# Patient Record
Sex: FEMALE | Race: WHITE | NOT HISPANIC OR LATINO | Employment: FULL TIME | URBAN - METROPOLITAN AREA
[De-identification: names, ages, dates, MRNs, and addresses within clinical notes are randomized per-mention and may not be internally consistent; named-entity substitution may affect disease eponyms.]

---

## 2018-11-10 ENCOUNTER — OFFICE VISIT (OUTPATIENT)
Dept: URGENT CARE | Facility: CLINIC | Age: 38
End: 2018-11-10
Payer: COMMERCIAL

## 2018-11-10 VITALS
DIASTOLIC BLOOD PRESSURE: 88 MMHG | TEMPERATURE: 98.4 F | WEIGHT: 216 LBS | RESPIRATION RATE: 16 BRPM | SYSTOLIC BLOOD PRESSURE: 136 MMHG | BODY MASS INDEX: 43.55 KG/M2 | HEIGHT: 59 IN | OXYGEN SATURATION: 99 % | HEART RATE: 72 BPM

## 2018-11-10 DIAGNOSIS — J40 BRONCHITIS: Primary | ICD-10-CM

## 2018-11-10 PROCEDURE — 99213 OFFICE O/P EST LOW 20 MIN: CPT | Performed by: PHYSICIAN ASSISTANT

## 2018-11-10 RX ORDER — AZITHROMYCIN 250 MG/1
TABLET, FILM COATED ORAL
Qty: 6 TABLET | Refills: 0 | Status: SHIPPED | OUTPATIENT
Start: 2018-11-10 | End: 2018-11-15

## 2018-11-10 RX ORDER — PREDNISONE 10 MG/1
TABLET ORAL
Qty: 21 TABLET | Refills: 0 | Status: SHIPPED | OUTPATIENT
Start: 2018-11-10 | End: 2022-04-14

## 2018-11-10 RX ORDER — ATENOLOL 25 MG/1
TABLET ORAL
COMMUNITY
Start: 2016-06-12

## 2018-11-10 NOTE — PROGRESS NOTES
NAME: Yesica Yarbrough is a 34 y o  female  : 1980    MRN: [id number]      Assessment and Plan   Bronchitis [J40]  1  Bronchitis  azithromycin (ZITHROMAX) 250 mg tablet    predniSONE 10 mg tablet           Patient Instructions   Patient Instructions   Take azithromycin and prednisone as directed  Increased fluids and rest  Take antihistamine such as Allegra or Zyrtec  If no improvement in 2-3 days follow-up with PCP  If anything worsens go to the ER    Proceed to ER if symptoms worsen  History of Present Illness     Patient presents complaining of 4 week history of cough and congestion  She reports that she had a skin infection was put on doxycycline  which did nothing for her symptoms  She reports she is now experiencing wheezing, coughing fits, dyspnea on exertion  Denies any fever, chills, leg swelling, cardiac history or any past medical history  Denies any history of asthma or smoking or COPD  Has not taken anything over-the-counter for this        Review of Systems   Review of Systems   Constitutional: Negative for chills and fever  HENT: Positive for congestion and sore throat  Negative for ear pain, rhinorrhea, sinus pain and sinus pressure  Respiratory: Positive for cough, chest tightness, shortness of breath and wheezing  Negative for stridor  Cardiovascular: Negative for chest pain and palpitations           Current Medications       Current Outpatient Prescriptions:     atenolol (TENORMIN) 25 mg tablet, TAKE 1 TABLET BY MOUTH ONCE A DAY, Disp: , Rfl:     co-enzyme Q-10 50 MG capsule, Take 50 mg by mouth daily, Disp: , Rfl:     azithromycin (ZITHROMAX) 250 mg tablet, Take 2 tablets today then 1 tablet daily x 4 days, Disp: 6 tablet, Rfl: 0    predniSONE 10 mg tablet, Take 6 tablets today, 5 tablets tomorrow, 4 the next day, 3 the next day, 2 the following and 1 the last day- all with food, Disp: 21 tablet, Rfl: 0    Current Allergies     Allergies as of 11/10/2018 - Reviewed 11/10/2018   Allergen Reaction Noted    Sulfa antibiotics Anaphylaxis 06/24/2016    Mupirocin Hives 11/10/2018              No past medical history on file  No past surgical history on file  No family history on file  Medications have been verified  The following portions of the patient's history were reviewed and updated as appropriate: allergies, current medications, past family history, past medical history, past social history, past surgical history and problem list     Objective   /88   Pulse 72   Temp 98 4 °F (36 9 °C)   Resp 16   Ht 4' 11" (1 499 m)   Wt 98 kg (216 lb)   SpO2 99%   BMI 43 63 kg/m²      Physical Exam     Physical Exam   Constitutional: She appears well-developed and well-nourished  No distress  HENT:   TMs clear bilaterally without erythema or bulging  Nasal mucosa without erythema or edema  Oropharynx without erythema, edema or exudates  Cardiovascular: Normal rate, regular rhythm and normal heart sounds  Pulmonary/Chest: Effort normal  No respiratory distress  Bilateral expiratory wheezes in the upper lung fields  Bilateral rhonchi in lower lung fields which clears with coughing  No decreased breath sounds  No rales  No accessory muscle use  Lymphadenopathy:     She has no cervical adenopathy

## 2018-11-10 NOTE — PATIENT INSTRUCTIONS
Take azithromycin and prednisone as directed  Increased fluids and rest  Take antihistamine such as Allegra or Zyrtec  If no improvement in 2-3 days follow-up with PCP  If anything worsens go to the ER

## 2018-11-27 ENCOUNTER — APPOINTMENT (OUTPATIENT)
Dept: RADIOLOGY | Facility: CLINIC | Age: 38
End: 2018-11-27
Payer: COMMERCIAL

## 2018-11-27 ENCOUNTER — OFFICE VISIT (OUTPATIENT)
Dept: URGENT CARE | Facility: CLINIC | Age: 38
End: 2018-11-27
Payer: COMMERCIAL

## 2018-11-27 VITALS
HEART RATE: 60 BPM | BODY MASS INDEX: 43.95 KG/M2 | HEIGHT: 59 IN | OXYGEN SATURATION: 98 % | SYSTOLIC BLOOD PRESSURE: 120 MMHG | TEMPERATURE: 98.1 F | RESPIRATION RATE: 16 BRPM | WEIGHT: 218 LBS | DIASTOLIC BLOOD PRESSURE: 84 MMHG

## 2018-11-27 DIAGNOSIS — R05.9 COUGH: Primary | ICD-10-CM

## 2018-11-27 DIAGNOSIS — R05.9 COUGH: ICD-10-CM

## 2018-11-27 PROCEDURE — 71046 X-RAY EXAM CHEST 2 VIEWS: CPT

## 2018-11-27 PROCEDURE — 99203 OFFICE O/P NEW LOW 30 MIN: CPT | Performed by: PREVENTIVE MEDICINE

## 2018-11-27 RX ORDER — PROMETHAZINE HYDROCHLORIDE AND CODEINE PHOSPHATE 6.25; 1 MG/5ML; MG/5ML
5 SYRUP ORAL EVERY 4 HOURS PRN
Qty: 120 ML | Refills: 0 | Status: SHIPPED | OUTPATIENT
Start: 2018-11-27 | End: 2022-04-14

## 2018-11-27 RX ORDER — ALBUTEROL SULFATE 90 UG/1
2 AEROSOL, METERED RESPIRATORY (INHALATION) EVERY 6 HOURS PRN
Qty: 18 G | Refills: 0 | Status: SHIPPED | OUTPATIENT
Start: 2018-11-27

## 2018-11-28 NOTE — PROGRESS NOTES
[email or web address]        NAME: Austen Nuñez is a 34 y o  female  : 1980    MRN: [id number]  DATE: 2018  TIME: 7:46 PM    Assessment and Plan   Cough [R05]  1  Cough  XR chest pa & lateral    promethazine-codeine (PHENERGAN WITH CODEINE) 6 25-10 mg/5 mL syrup    albuterol (VENTOLIN HFA) 90 mcg/act inhaler         Patient Instructions       Follow up with PCP in 3-5 days  Proceed to  ER if symptoms worsen  Chief Complaint     Chief Complaint   Patient presents with    Cough     Pt c/o cough and wheezing since 10/20  Tx for bronchitis 11/10 with z-pack and prednisone but symptoms only slightly improved  History of Present Illness       She has been coughing with on and off wheezing times a month  She was [facility] 2 weeks ago where she was given Z-Saravanan and prednisone  Apparently she felt mildly improved on the medication but symptoms immediately returned  Review of Systems   Review of Systems   Respiratory: Positive for cough, shortness of breath and wheezing            Current Medications       Current Outpatient Prescriptions:     PRAVASTATIN SODIUM PO, Take by mouth, Disp: , Rfl:     albuterol (VENTOLIN HFA) 90 mcg/act inhaler, Inhale 2 puffs every 6 (six) hours as needed for wheezing, Disp: 18 g, Rfl: 0    atenolol (TENORMIN) 25 mg tablet, TAKE 1 TABLET BY MOUTH ONCE A DAY, Disp: , Rfl:     co-enzyme Q-10 50 MG capsule, Take 50 mg by mouth daily, Disp: , Rfl:     predniSONE 10 mg tablet, Take 6 tablets today, 5 tablets tomorrow, 4 the next day, 3 the next day, 2 the following and 1 the last day- all with food, Disp: 21 tablet, Rfl: 0    promethazine-codeine (PHENERGAN WITH CODEINE) 6 25-10 mg/5 mL syrup, Take 5 mL by mouth every 4 (four) hours as needed for cough, Disp: 120 mL, Rfl: 0    Current Allergies     Allergies as of 2018 - Reviewed 2018   Allergen Reaction Noted    Sulfa antibiotics Anaphylaxis 2016    Mupirocin Hives 11/10/2018            The following portions of the patient's history were reviewed and updated as appropriate: allergies, current medications, past family history, past medical history, past social history, past surgical history and problem list      Past Medical History:   Diagnosis Date    Hyperlipidemia     Hypertension        History reviewed  No pertinent surgical history  No family history on file  Medications have been verified  Objective   /84   Pulse 60   Temp 98 1 °F (36 7 °C)   Resp 16   Ht 4' 11" (1 499 m)   Wt 98 9 kg (218 lb)   SpO2 98%   BMI 44 03 kg/m²        Physical Exam     Physical Exam   HENT:   Right Ear: External ear normal    Left Ear: External ear normal    Mouth/Throat: Oropharynx is clear and moist    Cardiovascular: Normal heart sounds  Pulmonary/Chest:   In the lower lung fields slight end inspiratory wheezing throughout  No rales or rhonchi  Lymphadenopathy:     She has no cervical adenopathy

## 2018-11-28 NOTE — PATIENT INSTRUCTIONS
Try the codeine cough medicine to help sleep  Use a Ventolin inhaler to help with the wheezing and the congestion  If you're not markedly improved the next 3-5 days I believe you need to see her family doctor for further workup for asthma

## 2022-04-07 ENCOUNTER — VBI (OUTPATIENT)
Dept: ADMINISTRATIVE | Facility: OTHER | Age: 42
End: 2022-04-07

## 2022-04-07 NOTE — TELEPHONE ENCOUNTER
Upon review of the In Basket request we were able to locate, review, and update the patient chart as requested for Pap Smear (HPV) aka Cervical Cancer Screening  Any additional questions or concerns should be emailed to the Practice Liaisons via [email or web address] email, please do not reply via In Basket      Thank you  Karthik Corey

## 2022-04-14 ENCOUNTER — ANNUAL EXAM (OUTPATIENT)
Dept: OBGYN CLINIC | Facility: CLINIC | Age: 42
End: 2022-04-14
Payer: COMMERCIAL

## 2022-04-14 VITALS
WEIGHT: 223.2 LBS | DIASTOLIC BLOOD PRESSURE: 62 MMHG | HEIGHT: 58 IN | BODY MASS INDEX: 46.85 KG/M2 | SYSTOLIC BLOOD PRESSURE: 120 MMHG

## 2022-04-14 DIAGNOSIS — Z01.419 ROUTINE GYNECOLOGICAL EXAMINATION: Primary | ICD-10-CM

## 2022-04-14 DIAGNOSIS — Z12.31 ENCOUNTER FOR SCREENING MAMMOGRAM FOR MALIGNANT NEOPLASM OF BREAST: ICD-10-CM

## 2022-04-14 PROCEDURE — S0612 ANNUAL GYNECOLOGICAL EXAMINA: HCPCS | Performed by: OBSTETRICS & GYNECOLOGY

## 2022-04-14 NOTE — PROGRESS NOTES
[id number]   [address], [address], [facility], [facility]    ASSESSMENT/PLAN: Dany Rae is a 35 y o   who presents for annual gynecologic exam     Encounter for routine gynecologic examination  - Routine well woman exam completed today  - Cervical Cancer Screening: Current ASCCP Guidelines reviewed  Last Pap: 2020   Next Pap Due:   - COVID vaccine  +  Pfizer   1 + 2  ,    - STI screening offered including HIV testing: na   - Contraceptive counseling discussed  Current contraception   Tubal ligation   - Breast Cancer Screening: Last Mammogram ,  Order given     Additional problems addressed during this visit:  1  Encounter for screening mammogram for malignant neoplasm of breast  -     Mammo screening bilateral w 3d & cad; Future; Expected date: 2023    2  BMI 45 0-49 9, adult (HCC)    43  Cycles  Every 28 d    Flow can wax and wanes  CC:  Annual Gynecologic Examination    HPI: Dany Rae is a 35 y o   who presents for annual gynecologic examination  36 yo here for wellness exam    + BTL  Cycles  Monthly short in duration  + desires to exercise  The following portions of the patient's history were reviewed and updated as appropriate: She  has a past medical history of Anxiety and depression, Hyperlipidemia, Hypertension, Menorrhagia with regular cycle, and Obesity  She  has a past surgical history that includes Mammo (historical) (2020);  section; and Tubal ligation ()  Her family history includes Colon cancer in her maternal grandmother; Coronary artery disease in her father; Early menopause in her mother; Endometriosis in her mother; Hyperlipidemia in her father and mother; Hypertension in her father, maternal aunt, maternal grandfather, maternal grandmother, maternal uncle, mother, paternal aunt, paternal grandfather, paternal grandmother, and paternal uncle; Skin cancer in her father; Stroke in her father    She  reports that she has never smoked  She has never used smokeless tobacco  She reports current alcohol use  She reports that she does not use drugs  Current Outpatient Medications   Medication Sig Dispense Refill    albuterol (VENTOLIN HFA) 90 mcg/act inhaler Inhale 2 puffs every 6 (six) hours as needed for wheezing 18 g 0    atenolol (TENORMIN) 25 mg tablet TAKE 1 TABLET BY MOUTH ONCE A DAY      co-enzyme Q-10 50 MG capsule Take 100 mg by mouth daily        PRAVASTATIN SODIUM PO Take 20 mg by mouth in the morning         No current facility-administered medications for this visit  She is allergic to sulfa antibiotics and mupirocin       Review of Systems   Constitutional: Negative for chills and fever  HENT: Negative for ear pain and sore throat  Eyes: Negative for pain and visual disturbance  Respiratory: Negative for cough and shortness of breath  Cardiovascular: Negative for chest pain and palpitations  Gastrointestinal: Negative for abdominal pain and vomiting  Genitourinary: Negative for dysuria and hematuria  Musculoskeletal: Negative for arthralgias and back pain  Skin: Negative for color change and rash  Neurological: Negative for seizures and syncope  Psychiatric/Behavioral: Negative  All other systems reviewed and are negative  Objective:  /62   Ht 4' 10" (1 473 m)   Wt 101 kg (223 lb 3 2 oz)   LMP 03/27/2022   Breastfeeding No   BMI 46 65 kg/m²    Physical Exam  Vitals and nursing note reviewed  Constitutional:       Appearance: Normal appearance  HENT:      Head: Normocephalic  Cardiovascular:      Rate and Rhythm: Normal rate and regular rhythm  Pulses: Normal pulses  Heart sounds: Normal heart sounds  Pulmonary:      Effort: Pulmonary effort is normal       Breath sounds: Normal breath sounds  Chest:      Chest wall: No mass, lacerations, swelling, tenderness or edema  Breasts: Liban Score is 4   Breasts are symmetrical       Right: Normal  No swelling, bleeding, inverted nipple, mass, nipple discharge, skin change, tenderness, axillary adenopathy or supraclavicular adenopathy  Left: No swelling, bleeding, inverted nipple, mass, nipple discharge, skin change, tenderness, axillary adenopathy or supraclavicular adenopathy  Abdominal:      General: Abdomen is flat  Bowel sounds are normal       Palpations: Abdomen is soft  Genitourinary:     General: Normal vulva  Exam position: Lithotomy position  Pubic Area: No rash  Liban stage (genital): 4       Labia:         Right: No rash, tenderness or lesion  Left: No rash, tenderness or lesion  Urethra: No urethral pain, urethral swelling or urethral lesion  Vagina: Normal       Cervix: No cervical motion tenderness or discharge  Uterus: Normal        Adnexa: Right adnexa normal and left adnexa normal       Rectum: Normal    Musculoskeletal:         General: Normal range of motion  Cervical back: Neck supple  Lymphadenopathy:      Upper Body:      Right upper body: No supraclavicular, axillary or pectoral adenopathy  Left upper body: No supraclavicular, axillary or pectoral adenopathy  Lower Body: No right inguinal adenopathy  No left inguinal adenopathy  Skin:     General: Skin is warm and dry  Neurological:      General: No focal deficit present  Mental Status: She is alert and oriented to person, place, and time  Psychiatric:         Mood and Affect: Mood normal          Behavior: Behavior normal          Thought Content:  Thought content normal          Judgment: Judgment normal

## 2024-11-20 ENCOUNTER — TELEPHONE (OUTPATIENT)
Age: 44
End: 2024-11-20

## 2024-11-20 ENCOUNTER — CONSULT (OUTPATIENT)
Age: 44
End: 2024-11-20
Payer: COMMERCIAL

## 2024-11-20 VITALS
BODY MASS INDEX: 46 KG/M2 | HEIGHT: 59 IN | SYSTOLIC BLOOD PRESSURE: 112 MMHG | DIASTOLIC BLOOD PRESSURE: 78 MMHG | WEIGHT: 228.2 LBS

## 2024-11-20 DIAGNOSIS — K58.2 IRRITABLE BOWEL SYNDROME WITH BOTH CONSTIPATION AND DIARRHEA: ICD-10-CM

## 2024-11-20 DIAGNOSIS — K21.9 GASTROESOPHAGEAL REFLUX DISEASE, UNSPECIFIED WHETHER ESOPHAGITIS PRESENT: Primary | ICD-10-CM

## 2024-11-20 DIAGNOSIS — R10.13 EPIGASTRIC PAIN: ICD-10-CM

## 2024-11-20 DIAGNOSIS — Z12.11 SCREENING FOR COLON CANCER: Primary | ICD-10-CM

## 2024-11-20 DIAGNOSIS — Z12.11 SCREENING FOR COLON CANCER: ICD-10-CM

## 2024-11-20 DIAGNOSIS — R06.02 SHORTNESS OF BREATH: ICD-10-CM

## 2024-11-20 PROCEDURE — 99203 OFFICE O/P NEW LOW 30 MIN: CPT | Performed by: INTERNAL MEDICINE

## 2024-11-20 RX ORDER — ATENOLOL 50 MG/1
1 TABLET ORAL DAILY
COMMUNITY
Start: 2024-10-29

## 2024-11-20 RX ORDER — RABEPRAZOLE SODIUM 20 MG/1
20 TABLET, DELAYED RELEASE ORAL DAILY
Qty: 30 TABLET | Refills: 2 | Status: SHIPPED | OUTPATIENT
Start: 2024-11-20 | End: 2025-02-18

## 2024-11-20 RX ORDER — OMEPRAZOLE 40 MG/1
40 CAPSULE, DELAYED RELEASE ORAL AS NEEDED
COMMUNITY
End: 2024-11-20

## 2024-11-20 RX ORDER — FAMOTIDINE 40 MG/1
40 TABLET, FILM COATED ORAL
COMMUNITY

## 2024-11-20 RX ORDER — SACCHAROMYCES BOULARDII 250 MG
250 CAPSULE ORAL 2 TIMES DAILY
COMMUNITY

## 2024-11-20 RX ORDER — SODIUM PICOSULFATE, MAGNESIUM OXIDE, AND ANHYDROUS CITRIC ACID 12; 3.5; 1 G/175ML; G/175ML; MG/175ML
LIQUID ORAL
Qty: 350 ML | Refills: 0 | Status: SHIPPED | OUTPATIENT
Start: 2024-11-20

## 2024-11-20 RX ORDER — PRAVASTATIN SODIUM 20 MG
1 TABLET ORAL DAILY
COMMUNITY
Start: 2024-10-29

## 2024-11-20 NOTE — H&P (VIEW-ONLY)
[facility] - Outpatient Consultation  Chen Peace 44 y.o. female MRN: [id number]  Encounter: [id number]    ASSESSMENT AND PLAN:      1. Gastroesophageal reflux disease, unspecified whether esophagitis present (Primary)  --Patient with some chronic GERD.  Seems to be worse 2 months or so.  Seems to be worse at night.  Some relief with famotidine.  Seems to get worse as the day goes along.  No dysphagia.  Patient reports that she did not quite feel well on omeprazole.  - RABEprazole (ACIPHEX) 20 MG tablet; Take 1 tablet (20 mg total) by mouth daily  Dispense: 30 tablet; Refill: 2  -Advised patient to take rabeprazole half hour before lunch or dinner.  Continue famotidine in the evening and Tums as needed  -EGD for further evaluation  -Antireflux diet  -Gradual weight loss-patient has lost 14 pounds in the last couple of months    2. Epigastric pain  -Patient with intermittent epigastric abdominal pain.  Sometimes after eating.  Will do abdominal ultrasound to evaluate for gallbladder disease and evaluate contour of the liver    3. Irritable bowel syndrome with both constipation and diarrhea  -Patient with alternating constipation and diarrhea.  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  Crampy abdominal pain relieved with a bowel movement    4. BMI 45.0-49.9, adult (HCC)  -BMI at 46.  Will need to do procedures hospital setting.  Discussed weight loss medications and general concepts regarding bariatric surgery.  If patient was to consider bariatric surgery gastric bypass would be indicated and not gastric sleeve  -GLP 1 medications can cause GI side effects.  Patient already eating several small meals per day    5. Screening for colon cancer  -Patient with a grandparent with colon cancer.  Could consider Cologuard testing however given patient's erratic bowel movements which are worse lately probably reasonable to proceed with  "colonoscopy at same time as EGD    6. Shortness of breath  -Patient does report increasing shortness of breath sometimes with eating and associated with her reflux.  Also has some problems with shortness of breath with exercise.  -Will try to control reflux better.    -She does use a inhaler as needed particularly in cold weather-chest of an element of reactive airway disease    Followup Appointment: 3-4 mo  ______________________________________________________________________    Chief Complaint   Patient presents with    GERD    Abdominal Pain     \"All over\" hard to breathe after eating, eating bland- every food causes pain     Patient referred for GI evaluation by her personal physician Dr. Yuki Noble    HPI:   Chen Peace is a 44 y.o. year old female who presents creasing problems with reflux-like symptoms over the last 2 months or so symptoms seem to be worse at night or as the day goes along.  She does have some relief with Pepcid Complete.  She has been eating bland food with some improvement of her symptoms.  He denies any problems with dysphagia.  She also complains of some belching and subxiphoid discomfort.  Patient reports getting a 2-week course of omeprazole and this did not really help the situation.  She would take the medication before breakfast.  Not had this previously to any major degree.  Patient does report when she gets the symptoms of the reflux she gets associated shortness of breath.  Patient also reports some decreased exercise tolerance and gets short of breath if he goes up some flights of stairs.  Patient does have a history of lower abdominal cramping and erratic bowel movements.  She reports her stools are \"all over the place\".  She has episodes of constipation and diarrhea.  She denies any rectal bleeding.  There were no first-degree relatives with colon cancer but patient did have 1 grandparent with colon cancer.  Patient reports she is eating several smaller meals per day " with about 4-5 which is bland food.  She does have some difficulty with exercise so she is trying to lose weight with diet.  She is lost about 14 pounds in the last couple of months.    Historical Information   Past Medical History:   Diagnosis Date    Anxiety and depression     Hyperlipidemia     Hypertension     Menorrhagia with regular cycle     Obesity     Reactive airway disease      Past Surgical History:   Procedure Laterality Date     SECTION      X2    MAMMO (HISTORICAL)  2020    TUBAL LIGATION      pt had embedded IUD removed and tubal ligation performed     Social History     Substance and Sexual Activity   Alcohol Use Yes    Comment: occasional     Social History     Substance and Sexual Activity   Drug Use Never     Social History     Tobacco Use   Smoking Status Never   Smokeless Tobacco Never     Family History   Problem Relation Age of Onset    Early menopause Mother     Endometriosis Mother     Hypertension Mother     Hyperlipidemia Mother     Skin cancer Father     Stroke Father     Coronary artery disease Father     Hypertension Father     Hyperlipidemia Father     Colon cancer Maternal Grandmother     Hypertension Maternal Grandmother     Hypertension Maternal Grandfather     Hypertension Paternal Grandmother     Hypertension Paternal Grandfather     Hypertension Maternal Aunt     Hypertension Maternal Uncle     Hypertension Paternal Aunt     Hypertension Paternal Uncle     Breast cancer Neg Hx     Ovarian cancer Neg Hx     Uterine cancer Neg Hx        Meds/Allergies     Current Outpatient Medications:     albuterol (VENTOLIN HFA) 90 mcg/act inhaler    atenolol (TENORMIN) 25 mg tablet    atenolol (TENORMIN) 50 mg tablet    co-enzyme Q-10 50 MG capsule    Pepcid 40 MG tablet    pravastatin (PRAVACHOL) 20 mg tablet    PRAVASTATIN SODIUM PO    RABEprazole (ACIPHEX) 20 MG tablet    saccharomyces boulardii (FLORASTOR) 250 mg capsule    Allergies   Allergen Reactions    Sulfa Antibiotics  "Anaphylaxis    Sulfamethoxazole-Trimethoprim Anaphylaxis    Mupirocin Hives       PHYSICAL EXAM:    Blood pressure 112/78, height 4' 11\" (1.499 m), weight 104 kg (228 lb 3.2 oz), not currently breastfeeding. Body mass index is 46.09 kg/m².  General Appearance: NAD, cooperative, alert  Eyes: Anicteric, conjunctiva pink   ENT:  Normocephalic, atraumatic, normal mucosa.    Neck:  Supple, symmetrical, trachea midline,   Resp:  Clear to auscultation bilaterally; no rales, rhonchi or wheezing; respirations unlabored   CV:  S1 S2, Regular rate and rhythm; no murmur, rub, or gallop.  GI:  Soft, non-tender, non-distended; normal bowel sounds; no masses, no organomegaly   Rectal: Deferred  Musculoskeletal: No cyanosis, clubbing or edema. Normal ROM.  Skin:  No jaundice, rashes, or lesions   Heme/Lymph: No palpable cervical lymphadenopathy  Psych: Normal affect, good eye contact  Neuro: No gross deficits, AAOx3        REVIEW OF SYSTEMS:    CONSTITUTIONAL: Denies any fever, chills, rigors, positive for voluntary weight loss  HEENT: No earache or tinnitus. Denies hearing loss or visual disturbances.  CARDIOVASCULAR: No chest pain or palpitations.   RESPIRATORY: Positive for shortness of breath with exertion.  GASTROINTESTINAL: As noted in the History of Present Illness.   GENITOURINARY: No problems with urination. Denies any hematuria or dysuria.  NEUROLOGIC: No dizziness or vertigo, denies headaches.   MUSCULOSKELETAL: Denies any muscle or joint pain.   SKIN: Denies skin rashes or itching.   ENDOCRINE: Denies excessive thirst. Denies intolerance to heat or cold.  PSYCHOSOCIAL: Denies depression or anxiety. Denies any recent memory loss.     "

## 2024-11-20 NOTE — PROGRESS NOTES
[facility] - Outpatient Consultation  Chen Peace 44 y.o. female MRN: [id number]  Encounter: [id number]    ASSESSMENT AND PLAN:      1. Gastroesophageal reflux disease, unspecified whether esophagitis present (Primary)  --Patient with some chronic GERD.  Seems to be worse 2 months or so.  Seems to be worse at night.  Some relief with famotidine.  Seems to get worse as the day goes along.  No dysphagia.  Patient reports that she did not quite feel well on omeprazole.  - RABEprazole (ACIPHEX) 20 MG tablet; Take 1 tablet (20 mg total) by mouth daily  Dispense: 30 tablet; Refill: 2  -Advised patient to take rabeprazole half hour before lunch or dinner.  Continue famotidine in the evening and Tums as needed  -EGD for further evaluation  -Antireflux diet  -Gradual weight loss-patient has lost 14 pounds in the last couple of months    2. Epigastric pain  -Patient with intermittent epigastric abdominal pain.  Sometimes after eating.  Will do abdominal ultrasound to evaluate for gallbladder disease and evaluate contour of the liver    3. Irritable bowel syndrome with both constipation and diarrhea  -Patient with alternating constipation and diarrhea.  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  Crampy abdominal pain relieved with a bowel movement    4. BMI 45.0-49.9, adult (HCC)  -BMI at 46.  Will need to do procedures hospital setting.  Discussed weight loss medications and general concepts regarding bariatric surgery.  If patient was to consider bariatric surgery gastric bypass would be indicated and not gastric sleeve  -GLP 1 medications can cause GI side effects.  Patient already eating several small meals per day    5. Screening for colon cancer  -Patient with a grandparent with colon cancer.  Could consider Cologuard testing however given patient's erratic bowel movements which are worse lately probably reasonable to proceed with  "colonoscopy at same time as EGD    6. Shortness of breath  -Patient does report increasing shortness of breath sometimes with eating and associated with her reflux.  Also has some problems with shortness of breath with exercise.  -Will try to control reflux better.    -She does use a inhaler as needed particularly in cold weather-chest of an element of reactive airway disease    Followup Appointment: 3-4 mo  ______________________________________________________________________    Chief Complaint   Patient presents with    GERD    Abdominal Pain     \"All over\" hard to breathe after eating, eating bland- every food causes pain     Patient referred for GI evaluation by her personal physician Dr. Yuki Noble    HPI:   Chen Peace is a 44 y.o. year old female who presents creasing problems with reflux-like symptoms over the last 2 months or so symptoms seem to be worse at night or as the day goes along.  She does have some relief with Pepcid Complete.  She has been eating bland food with some improvement of her symptoms.  He denies any problems with dysphagia.  She also complains of some belching and subxiphoid discomfort.  Patient reports getting a 2-week course of omeprazole and this did not really help the situation.  She would take the medication before breakfast.  Not had this previously to any major degree.  Patient does report when she gets the symptoms of the reflux she gets associated shortness of breath.  Patient also reports some decreased exercise tolerance and gets short of breath if he goes up some flights of stairs.  Patient does have a history of lower abdominal cramping and erratic bowel movements.  She reports her stools are \"all over the place\".  She has episodes of constipation and diarrhea.  She denies any rectal bleeding.  There were no first-degree relatives with colon cancer but patient did have 1 grandparent with colon cancer.  Patient reports she is eating several smaller meals per day " with about 4-5 which is bland food.  She does have some difficulty with exercise so she is trying to lose weight with diet.  She is lost about 14 pounds in the last couple of months.    Historical Information   Past Medical History:   Diagnosis Date    Anxiety and depression     Hyperlipidemia     Hypertension     Menorrhagia with regular cycle     Obesity     Reactive airway disease      Past Surgical History:   Procedure Laterality Date     SECTION      X2    MAMMO (HISTORICAL)  2020    TUBAL LIGATION      pt had embedded IUD removed and tubal ligation performed     Social History     Substance and Sexual Activity   Alcohol Use Yes    Comment: occasional     Social History     Substance and Sexual Activity   Drug Use Never     Social History     Tobacco Use   Smoking Status Never   Smokeless Tobacco Never     Family History   Problem Relation Age of Onset    Early menopause Mother     Endometriosis Mother     Hypertension Mother     Hyperlipidemia Mother     Skin cancer Father     Stroke Father     Coronary artery disease Father     Hypertension Father     Hyperlipidemia Father     Colon cancer Maternal Grandmother     Hypertension Maternal Grandmother     Hypertension Maternal Grandfather     Hypertension Paternal Grandmother     Hypertension Paternal Grandfather     Hypertension Maternal Aunt     Hypertension Maternal Uncle     Hypertension Paternal Aunt     Hypertension Paternal Uncle     Breast cancer Neg Hx     Ovarian cancer Neg Hx     Uterine cancer Neg Hx        Meds/Allergies     Current Outpatient Medications:     albuterol (VENTOLIN HFA) 90 mcg/act inhaler    atenolol (TENORMIN) 25 mg tablet    atenolol (TENORMIN) 50 mg tablet    co-enzyme Q-10 50 MG capsule    Pepcid 40 MG tablet    pravastatin (PRAVACHOL) 20 mg tablet    PRAVASTATIN SODIUM PO    RABEprazole (ACIPHEX) 20 MG tablet    saccharomyces boulardii (FLORASTOR) 250 mg capsule    Allergies   Allergen Reactions    Sulfa Antibiotics  "Anaphylaxis    Sulfamethoxazole-Trimethoprim Anaphylaxis    Mupirocin Hives       PHYSICAL EXAM:    Blood pressure 112/78, height 4' 11\" (1.499 m), weight 104 kg (228 lb 3.2 oz), not currently breastfeeding. Body mass index is 46.09 kg/m².  General Appearance: NAD, cooperative, alert  Eyes: Anicteric, conjunctiva pink   ENT:  Normocephalic, atraumatic, normal mucosa.    Neck:  Supple, symmetrical, trachea midline,   Resp:  Clear to auscultation bilaterally; no rales, rhonchi or wheezing; respirations unlabored   CV:  S1 S2, Regular rate and rhythm; no murmur, rub, or gallop.  GI:  Soft, non-tender, non-distended; normal bowel sounds; no masses, no organomegaly   Rectal: Deferred  Musculoskeletal: No cyanosis, clubbing or edema. Normal ROM.  Skin:  No jaundice, rashes, or lesions   Heme/Lymph: No palpable cervical lymphadenopathy  Psych: Normal affect, good eye contact  Neuro: No gross deficits, AAOx3        REVIEW OF SYSTEMS:    CONSTITUTIONAL: Denies any fever, chills, rigors, positive for voluntary weight loss  HEENT: No earache or tinnitus. Denies hearing loss or visual disturbances.  CARDIOVASCULAR: No chest pain or palpitations.   RESPIRATORY: Positive for shortness of breath with exertion.  GASTROINTESTINAL: As noted in the History of Present Illness.   GENITOURINARY: No problems with urination. Denies any hematuria or dysuria.  NEUROLOGIC: No dizziness or vertigo, denies headaches.   MUSCULOSKELETAL: Denies any muscle or joint pain.   SKIN: Denies skin rashes or itching.   ENDOCRINE: Denies excessive thirst. Denies intolerance to heat or cold.  PSYCHOSOCIAL: Denies depression or anxiety. Denies any recent memory loss.     "

## 2024-11-20 NOTE — TELEPHONE ENCOUNTER
Scheduled date of combo (as of today): 11/27/2024  Physician performing combo: ND  Location of combo: [facility]  Bowel prep reviewed with patient: Clenpiq  Instructions reviewed with patient by: JOSE  Clearances: N

## 2024-11-20 NOTE — PATIENT INSTRUCTIONS
[facility] - Outpatient Consultation  Chen Peace 44 y.o. female MRN: [id number]  Encounter: [id number]    ASSESSMENT AND PLAN:      1. Gastroesophageal reflux disease, unspecified whether esophagitis present (Primary)  --Patient with some chronic GERD.  Seems to be worse 2 months or so.  Seems to be worse at night.  Some relief with famotidine.  Seems to get worse as the day goes along.  No dysphagia.  Patient reports that she did not quite feel well on omeprazole.  - RABEprazole (ACIPHEX) 20 MG tablet; Take 1 tablet (20 mg total) by mouth daily  Dispense: 30 tablet; Refill: 2  -Advised patient to take rabeprazole half hour before lunch or dinner.  Continue famotidine in the evening and Tums as needed  -EGD for further evaluation  -Antireflux diet  -Gradual weight loss-patient has lost 14 pounds in the last couple of months    2. Epigastric pain  -Patient with intermittent epigastric abdominal pain.  Sometimes after eating.  Will do abdominal ultrasound to evaluate for gallbladder disease and evaluate contour of the liver    3. Irritable bowel syndrome with both constipation and diarrhea  -Patient with alternating constipation and diarrhea.  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  Crampy abdominal pain relieved with a bowel movement    4. BMI 45.0-49.9, adult (HCC)  -BMI at 46.  Will need to do procedures hospital setting.  Discussed weight loss medications and general concepts regarding bariatric surgery.  If patient was to consider bariatric surgery gastric bypass would be indicated and not gastric sleeve  -GLP 1 medications can cause GI side effects.  Patient already eating several small meals per day    5. Screening for colon cancer  -Patient with a grandparent with colon cancer.  Could consider Cologuard testing however given patient's erratic bowel movements which are worse lately probably reasonable to proceed with  colonoscopy at same time as EGD    6. Shortness of breath  -Patient does report increasing shortness of breath sometimes with eating and associated with her reflux.  Also has some problems with shortness of breath with exercise.  -Will try to control reflux better.  -She does use a inhaler as needed particularly in cold weather-chest of an element of reactive airway disease      Followup Appointment: 3-4 mo  _______________________________

## 2024-11-26 ENCOUNTER — TELEPHONE (OUTPATIENT)
Dept: GASTROENTEROLOGY | Facility: CLINIC | Age: 44
End: 2024-11-26

## 2024-11-26 ENCOUNTER — DOCUMENTATION (OUTPATIENT)
Dept: GASTROENTEROLOGY | Facility: CLINIC | Age: 44
End: 2024-11-26

## 2024-11-26 DIAGNOSIS — Z12.11 SCREENING FOR COLON CANCER: Primary | ICD-10-CM

## 2024-11-26 NOTE — TELEPHONE ENCOUNTER
Pharmacy calling back to ask if provider is able to change order(see prev note).  Pt is at the pharmacy.  Needs prep for tonight.     Please advise.

## 2024-11-26 NOTE — TELEPHONE ENCOUNTER
Pharmacy calling to say that Clenpiq is not on patients formulary for insurance.  They will cover Gavolyte G if can change order.

## 2024-11-27 ENCOUNTER — ANESTHESIA (OUTPATIENT)
Dept: GASTROENTEROLOGY | Facility: HOSPITAL | Age: 44
End: 2024-11-27
Payer: COMMERCIAL

## 2024-11-27 ENCOUNTER — ANESTHESIA EVENT (OUTPATIENT)
Dept: GASTROENTEROLOGY | Facility: HOSPITAL | Age: 44
End: 2024-11-27
Payer: COMMERCIAL

## 2024-11-27 ENCOUNTER — HOSPITAL ENCOUNTER (OUTPATIENT)
Dept: GASTROENTEROLOGY | Facility: HOSPITAL | Age: 44
Setting detail: OUTPATIENT SURGERY
Discharge: HOME | End: 2024-11-27
Attending: INTERNAL MEDICINE
Payer: COMMERCIAL

## 2024-11-27 VITALS
RESPIRATION RATE: 19 BRPM | DIASTOLIC BLOOD PRESSURE: 76 MMHG | SYSTOLIC BLOOD PRESSURE: 166 MMHG | TEMPERATURE: 97.8 F | OXYGEN SATURATION: 100 % | HEART RATE: 119 BPM

## 2024-11-27 DIAGNOSIS — K21.9 GASTROESOPHAGEAL REFLUX DISEASE, UNSPECIFIED WHETHER ESOPHAGITIS PRESENT: ICD-10-CM

## 2024-11-27 DIAGNOSIS — Z12.11 SCREENING FOR COLON CANCER: ICD-10-CM

## 2024-11-27 LAB
EXT PREGNANCY TEST URINE: NEGATIVE
EXT. CONTROL: NORMAL
GLUCOSE SERPL-MCNC: 96 MG/DL (ref 65–140)

## 2024-11-27 PROCEDURE — 88305 TISSUE EXAM BY PATHOLOGIST: CPT | Performed by: PATHOLOGY

## 2024-11-27 PROCEDURE — 82948 REAGENT STRIP/BLOOD GLUCOSE: CPT

## 2024-11-27 PROCEDURE — 43239 EGD BIOPSY SINGLE/MULTIPLE: CPT | Performed by: STUDENT IN AN ORGANIZED HEALTH CARE EDUCATION/TRAINING PROGRAM

## 2024-11-27 PROCEDURE — 81025 URINE PREGNANCY TEST: CPT | Performed by: ANESTHESIOLOGY

## 2024-11-27 PROCEDURE — 45385 COLONOSCOPY W/LESION REMOVAL: CPT | Performed by: STUDENT IN AN ORGANIZED HEALTH CARE EDUCATION/TRAINING PROGRAM

## 2024-11-27 RX ORDER — SODIUM CHLORIDE 9 MG/ML
INJECTION, SOLUTION INTRAVENOUS CONTINUOUS PRN
Status: DISCONTINUED | OUTPATIENT
Start: 2024-11-27 | End: 2024-11-27

## 2024-11-27 RX ORDER — PROPOFOL 10 MG/ML
INJECTION, EMULSION INTRAVENOUS AS NEEDED
Status: DISCONTINUED | OUTPATIENT
Start: 2024-11-27 | End: 2024-11-27

## 2024-11-27 RX ORDER — KETAMINE HCL IN NACL, ISO-OSM 100MG/10ML
SYRINGE (ML) INJECTION AS NEEDED
Status: DISCONTINUED | OUTPATIENT
Start: 2024-11-27 | End: 2024-11-27

## 2024-11-27 RX ORDER — ESMOLOL HYDROCHLORIDE 10 MG/ML
INJECTION INTRAVENOUS AS NEEDED
Status: DISCONTINUED | OUTPATIENT
Start: 2024-11-27 | End: 2024-11-27

## 2024-11-27 RX ORDER — PROPOFOL 10 MG/ML
INJECTION, EMULSION INTRAVENOUS CONTINUOUS PRN
Status: DISCONTINUED | OUTPATIENT
Start: 2024-11-27 | End: 2024-11-27

## 2024-11-27 RX ORDER — FENTANYL CITRATE 50 UG/ML
INJECTION, SOLUTION INTRAMUSCULAR; INTRAVENOUS AS NEEDED
Status: DISCONTINUED | OUTPATIENT
Start: 2024-11-27 | End: 2024-11-27

## 2024-11-27 RX ORDER — SODIUM CHLORIDE, SODIUM LACTATE, POTASSIUM CHLORIDE, CALCIUM CHLORIDE 600; 310; 30; 20 MG/100ML; MG/100ML; MG/100ML; MG/100ML
125 INJECTION, SOLUTION INTRAVENOUS CONTINUOUS
Status: DISCONTINUED | OUTPATIENT
Start: 2024-11-27 | End: 2024-12-01 | Stop reason: HOSPADM

## 2024-11-27 RX ORDER — LIDOCAINE HYDROCHLORIDE 20 MG/ML
INJECTION, SOLUTION EPIDURAL; INFILTRATION; INTRACAUDAL; PERINEURAL AS NEEDED
Status: DISCONTINUED | OUTPATIENT
Start: 2024-11-27 | End: 2024-11-27

## 2024-11-27 RX ORDER — GLYCOPYRROLATE 0.2 MG/ML
INJECTION INTRAMUSCULAR; INTRAVENOUS AS NEEDED
Status: DISCONTINUED | OUTPATIENT
Start: 2024-11-27 | End: 2024-11-27

## 2024-11-27 RX ADMIN — FENTANYL CITRATE 50 MCG: 50 INJECTION, SOLUTION INTRAMUSCULAR; INTRAVENOUS at 13:36

## 2024-11-27 RX ADMIN — LIDOCAINE HYDROCHLORIDE 60 MG: 20 INJECTION, SOLUTION EPIDURAL; INFILTRATION; INTRACAUDAL; PERINEURAL at 13:36

## 2024-11-27 RX ADMIN — PROPOFOL 30 MG: 10 INJECTION, EMULSION INTRAVENOUS at 13:41

## 2024-11-27 RX ADMIN — PROPOFOL 20 MG: 10 INJECTION, EMULSION INTRAVENOUS at 13:44

## 2024-11-27 RX ADMIN — ESMOLOL HYDROCHLORIDE 10 MG: 10 INJECTION, SOLUTION INTRAVENOUS at 13:47

## 2024-11-27 RX ADMIN — SODIUM CHLORIDE: 0.9 INJECTION, SOLUTION INTRAVENOUS at 13:26

## 2024-11-27 RX ADMIN — TOPICAL ANESTHETIC 1 SPRAY: 200 SPRAY DENTAL; PERIODONTAL at 13:34

## 2024-11-27 RX ADMIN — FENTANYL CITRATE 50 MCG: 50 INJECTION, SOLUTION INTRAMUSCULAR; INTRAVENOUS at 13:45

## 2024-11-27 RX ADMIN — PROPOFOL 100 MCG/KG/MIN: 10 INJECTION, EMULSION INTRAVENOUS at 13:44

## 2024-11-27 RX ADMIN — SODIUM CHLORIDE: 0.9 INJECTION, SOLUTION INTRAVENOUS at 14:00

## 2024-11-27 RX ADMIN — PROPOFOL 100 MG: 10 INJECTION, EMULSION INTRAVENOUS at 13:36

## 2024-11-27 RX ADMIN — Medication 20 MG: at 13:36

## 2024-11-27 RX ADMIN — PROPOFOL 20 MG: 10 INJECTION, EMULSION INTRAVENOUS at 13:37

## 2024-11-27 RX ADMIN — GLYCOPYRROLATE 0.2 MG: 0.2 INJECTION INTRAMUSCULAR; INTRAVENOUS at 13:29

## 2024-11-27 RX ADMIN — PROPOFOL 30 MG: 10 INJECTION, EMULSION INTRAVENOUS at 13:39

## 2024-11-27 NOTE — ANESTHESIA POSTPROCEDURE EVALUATION
Post-Op Assessment Note    CV Status:  Stable    Pain management: adequate       Mental Status:  Alert and awake   Hydration Status:  Stable   PONV Controlled:  None   Airway Patency:  Patent     Post Op Vitals Reviewed: Yes    No anethesia notable event occurred.    Staff: CRNA, Anesthesiologist         Last Filed PACU Vitals:  Vitals Value Taken Time   Temp     Pulse     BP     Resp     SpO2         Modified Ijeoma:  Activity: 2 (11/27/2024 12:39 PM)  Respiration: 2 (11/27/2024 12:39 PM)  Circulation: 2 (11/27/2024 12:39 PM)  Consciousness: 2 (11/27/2024 12:39 PM)  Oxygen Saturation: 2 (11/27/2024 12:39 PM)  Modified Ijeoma Score: 10 (11/27/2024 12:39 PM)

## 2024-11-27 NOTE — ANESTHESIA PREPROCEDURE EVALUATION
Procedure:  EGD  COLONOSCOPY  Pneumonia  1 month ago  Relevant Problems   GI/HEPATIC   (+) Gastroesophageal reflux disease   HTN   BMI-46  Physical Exam    Airway    Mallampati score: II  TM Distance: >3 FB  Neck ROM: full     Dental   No notable dental hx     Cardiovascular      Pulmonary      Other Findings  post-pubertal.      Anesthesia Plan  ASA Score- 3     Anesthesia Type- IV sedation with anesthesia with ASA Monitors.         Additional Monitors:     Airway Plan:            Plan Factors-Exercise tolerance (METS): >4 METS.    Chart reviewed.    Patient summary reviewed.    Patient is not a current smoker.              Induction- intravenous.    Postoperative Plan-         Informed Consent- Anesthetic plan and risks discussed with patient.  I personally reviewed this patient with the CRNA. Discussed and agreed on the Anesthesia Plan with the CRNA..

## 2024-11-27 NOTE — NURSING NOTE
Pt upon walking out the Gi suite, PT had a sycopal episode. Pt was caught by . Pt was rolled back into the GI via wheelchair. Vitals were sinus tach 122, Bp high 170s/60s, sp02- 99%. Pt became very nauseous. ED charge was alerted that we were rolling her down via wheelchair.

## 2024-11-27 NOTE — NURSING NOTE
This RN spoke with ED charge RN, charge RN verified that her and triage RN saw patient in the ED waiting room. Pt was called for twice by registration but was not present in waiting room. Pt's  was present with pt at time of arrival in ED waiting room.

## 2024-11-27 NOTE — INTERVAL H&P NOTE
H&P reviewed. After examining the patient I find no changes in the patients condition since the H&P had been written.    Vitals:    11/27/24 1308   BP: (!) 198/94   Pulse: (!) 124   Resp: 17   Temp: 97.8 °F (36.6 °C)   SpO2: 99%

## 2024-11-28 NOTE — ANESTHESIA PROCEDURE NOTES
Anesthesia Notable Event    Date/Time: 11/27/2024 7:54 PM    Performed by: George Perez MD  Authorized by: George Perez MD    Anesthesia notable event Other: other  Pt. taken  to the ED after  syncopal episode when about to leave the hospital after discharge from the GI 2nd floor. Reportedly, the patient left the ED before being seen.

## 2024-11-28 NOTE — ANESTHESIA POSTPROCEDURE EVALUATION
Post-Op Assessment Note    CV Status:  Stable  Pain Score: 0    Pain management: adequate       Mental Status:  Alert and awake   Hydration Status:  Euvolemic   PONV Controlled:  Controlled   Airway Patency:  Patent     Post Op Vitals Reviewed: Yes    Anethesia notable event occurred.    Staff: Anesthesiologist           Last Filed PACU Vitals:  Vitals Value Taken Time   Temp     Pulse 119 11/27/24 1419   /76 11/27/24 1419   Resp 19 11/27/24 1419   SpO2 100 % 11/27/24 1419       Modified Ijeoma:  Activity: 2 (11/27/2024  2:17 PM)  Respiration: 2 (11/27/2024  2:17 PM)  Circulation: 2 (11/27/2024  2:17 PM)  Consciousness: 2 (11/27/2024  2:17 PM)  Oxygen Saturation: 2 (11/27/2024  2:17 PM)  Modified Ijeoma Score: 10 (11/27/2024  2:17 PM)

## 2024-12-02 ENCOUNTER — HOSPITAL ENCOUNTER (INPATIENT)
Facility: HOSPITAL | Age: 44
LOS: 1 days | Discharge: HOME | DRG: 644 | End: 2024-12-05
Attending: EMERGENCY MEDICINE | Admitting: INTERNAL MEDICINE
Payer: COMMERCIAL

## 2024-12-02 ENCOUNTER — APPOINTMENT (EMERGENCY)
Dept: CT IMAGING | Facility: HOSPITAL | Age: 44
DRG: 644 | End: 2024-12-02
Payer: COMMERCIAL

## 2024-12-02 ENCOUNTER — APPOINTMENT (EMERGENCY)
Dept: RADIOLOGY | Facility: HOSPITAL | Age: 44
DRG: 644 | End: 2024-12-02
Payer: COMMERCIAL

## 2024-12-02 DIAGNOSIS — R10.9 ABDOMINAL PAIN: ICD-10-CM

## 2024-12-02 DIAGNOSIS — E05.90 HYPERTHYROIDISM: ICD-10-CM

## 2024-12-02 DIAGNOSIS — R07.9 CHEST PAIN: ICD-10-CM

## 2024-12-02 DIAGNOSIS — R79.89 LOW TSH LEVEL: ICD-10-CM

## 2024-12-02 DIAGNOSIS — R06.02 SHORTNESS OF BREATH: ICD-10-CM

## 2024-12-02 DIAGNOSIS — R55 SYNCOPE: Primary | ICD-10-CM

## 2024-12-02 PROBLEM — R00.0 TACHYCARDIA: Status: ACTIVE | Noted: 2024-12-02

## 2024-12-02 LAB
2HR DELTA HS TROPONIN: 3 NG/L
4HR DELTA HS TROPONIN: 3 NG/L
ALBUMIN SERPL BCG-MCNC: 4.2 G/DL (ref 3.5–5)
ALP SERPL-CCNC: 58 U/L (ref 34–104)
ALT SERPL W P-5'-P-CCNC: 23 U/L (ref 7–52)
AMORPH URATE CRY URNS QL MICRO: ABNORMAL
ANION GAP SERPL CALCULATED.3IONS-SCNC: 9 MMOL/L (ref 4–13)
AST SERPL W P-5'-P-CCNC: 29 U/L (ref 13–39)
ATRIAL RATE: 108 BPM
ATRIAL RATE: 122 BPM
ATRIAL RATE: 123 BPM
BACTERIA UR QL AUTO: ABNORMAL /HPF
BASOPHILS # BLD AUTO: 0.01 THOUSANDS/ÂΜL (ref 0–0.1)
BASOPHILS NFR BLD AUTO: 0 % (ref 0–1)
BILIRUB SERPL-MCNC: 0.37 MG/DL (ref 0.2–1)
BILIRUB UR QL STRIP: NEGATIVE
BNP SERPL-MCNC: 153 PG/ML (ref 0–100)
BUN SERPL-MCNC: 8 MG/DL (ref 5–25)
CALCIUM SERPL-MCNC: 10.3 MG/DL (ref 8.4–10.2)
CARDIAC TROPONIN I PNL SERPL HS: 18 NG/L (ref ?–50)
CARDIAC TROPONIN I PNL SERPL HS: 21 NG/L (ref ?–50)
CARDIAC TROPONIN I PNL SERPL HS: 21 NG/L (ref ?–50)
CHLORIDE SERPL-SCNC: 105 MMOL/L (ref 96–108)
CLARITY UR: ABNORMAL
CO2 SERPL-SCNC: 26 MMOL/L (ref 21–32)
COLOR UR: YELLOW
CREAT SERPL-MCNC: 0.44 MG/DL (ref 0.6–1.3)
EOSINOPHIL # BLD AUTO: 0.05 THOUSAND/ÂΜL (ref 0–0.61)
EOSINOPHIL NFR BLD AUTO: 1 % (ref 0–6)
ERYTHROCYTE [DISTWIDTH] IN BLOOD BY AUTOMATED COUNT: 13.7 % (ref 11.6–15.1)
EXT PREGNANCY TEST URINE: NEGATIVE
EXT. CONTROL: NORMAL
FLUAV AG UPPER RESP QL IA.RAPID: NEGATIVE
FLUBV AG UPPER RESP QL IA.RAPID: NEGATIVE
GFR SERPL CREATININE-BSD FRML MDRD: 121 ML/MIN/1.73SQ M
GLUCOSE SERPL-MCNC: 95 MG/DL (ref 65–140)
GLUCOSE UR STRIP-MCNC: NEGATIVE MG/DL
HCT VFR BLD AUTO: 39.2 % (ref 34.8–46.1)
HGB BLD-MCNC: 12.4 G/DL (ref 11.5–15.4)
HGB UR QL STRIP.AUTO: ABNORMAL
IMM GRANULOCYTES # BLD AUTO: 0.02 THOUSAND/UL (ref 0–0.2)
IMM GRANULOCYTES NFR BLD AUTO: 1 % (ref 0–2)
KETONES UR STRIP-MCNC: NEGATIVE MG/DL
LEUKOCYTE ESTERASE UR QL STRIP: NEGATIVE
LIPASE SERPL-CCNC: 25 U/L (ref 11–82)
LYMPHOCYTES # BLD AUTO: 1.36 THOUSANDS/ÂΜL (ref 0.6–4.47)
LYMPHOCYTES NFR BLD AUTO: 36 % (ref 14–44)
MAGNESIUM SERPL-MCNC: 1.8 MG/DL (ref 1.9–2.7)
MCH RBC QN AUTO: 24.2 PG (ref 26.8–34.3)
MCHC RBC AUTO-ENTMCNC: 31.6 G/DL (ref 31.4–37.4)
MCV RBC AUTO: 77 FL (ref 82–98)
MONOCYTES # BLD AUTO: 0.55 THOUSAND/ÂΜL (ref 0.17–1.22)
MONOCYTES NFR BLD AUTO: 15 % (ref 4–12)
MUCOUS THREADS UR QL AUTO: ABNORMAL
NEUTROPHILS # BLD AUTO: 1.77 THOUSANDS/ÂΜL (ref 1.85–7.62)
NEUTS SEG NFR BLD AUTO: 47 % (ref 43–75)
NITRITE UR QL STRIP: NEGATIVE
NON-SQ EPI CELLS URNS QL MICRO: ABNORMAL /HPF
NRBC BLD AUTO-RTO: 0 /100 WBCS
P AXIS: 58 DEGREES
P AXIS: 58 DEGREES
P AXIS: 63 DEGREES
PH UR STRIP.AUTO: 6 [PH]
PLATELET # BLD AUTO: 371 THOUSANDS/UL (ref 149–390)
PMV BLD AUTO: 10.2 FL (ref 8.9–12.7)
POTASSIUM SERPL-SCNC: 4.9 MMOL/L (ref 3.5–5.3)
PR INTERVAL: 122 MS
PR INTERVAL: 124 MS
PR INTERVAL: 126 MS
PROT SERPL-MCNC: 7.5 G/DL (ref 6.4–8.4)
PROT UR STRIP-MCNC: ABNORMAL MG/DL
QRS AXIS: 33 DEGREES
QRS AXIS: 33 DEGREES
QRS AXIS: 37 DEGREES
QRSD INTERVAL: 76 MS
QRSD INTERVAL: 76 MS
QRSD INTERVAL: 78 MS
QT INTERVAL: 296 MS
QT INTERVAL: 306 MS
QT INTERVAL: 328 MS
QTC INTERVAL: 423 MS
QTC INTERVAL: 436 MS
QTC INTERVAL: 439 MS
RBC # BLD AUTO: 5.12 MILLION/UL (ref 3.81–5.12)
RBC #/AREA URNS AUTO: ABNORMAL /HPF
SARS-COV+SARS-COV-2 AG RESP QL IA.RAPID: NEGATIVE
SODIUM SERPL-SCNC: 140 MMOL/L (ref 135–147)
SP GR UR STRIP.AUTO: <1.005 (ref 1–1.03)
T WAVE AXIS: 43 DEGREES
T WAVE AXIS: 44 DEGREES
T WAVE AXIS: 55 DEGREES
T4 FREE SERPL-MCNC: 3.95 NG/DL (ref 0.61–1.12)
TSH SERPL DL<=0.05 MIU/L-ACNC: <0.01 UIU/ML (ref 0.45–4.5)
UROBILINOGEN UR STRIP-ACNC: <2 MG/DL
VENTRICULAR RATE: 108 BPM
VENTRICULAR RATE: 122 BPM
VENTRICULAR RATE: 123 BPM
WBC # BLD AUTO: 3.76 THOUSAND/UL (ref 4.31–10.16)
WBC #/AREA URNS AUTO: ABNORMAL /HPF

## 2024-12-02 PROCEDURE — 36415 COLL VENOUS BLD VENIPUNCTURE: CPT

## 2024-12-02 PROCEDURE — 74176 CT ABD & PELVIS W/O CONTRAST: CPT

## 2024-12-02 PROCEDURE — 99285 EMERGENCY DEPT VISIT HI MDM: CPT

## 2024-12-02 PROCEDURE — 88305 TISSUE EXAM BY PATHOLOGIST: CPT | Performed by: PATHOLOGY

## 2024-12-02 PROCEDURE — 71250 CT THORAX DX C-: CPT

## 2024-12-02 PROCEDURE — 85025 COMPLETE CBC W/AUTO DIFF WBC: CPT

## 2024-12-02 PROCEDURE — 83880 ASSAY OF NATRIURETIC PEPTIDE: CPT

## 2024-12-02 PROCEDURE — 83735 ASSAY OF MAGNESIUM: CPT

## 2024-12-02 PROCEDURE — 71045 X-RAY EXAM CHEST 1 VIEW: CPT

## 2024-12-02 PROCEDURE — 87804 INFLUENZA ASSAY W/OPTIC: CPT

## 2024-12-02 PROCEDURE — 84484 ASSAY OF TROPONIN QUANT: CPT

## 2024-12-02 PROCEDURE — 81025 URINE PREGNANCY TEST: CPT

## 2024-12-02 PROCEDURE — 80053 COMPREHEN METABOLIC PANEL: CPT

## 2024-12-02 PROCEDURE — 93010 ELECTROCARDIOGRAM REPORT: CPT | Performed by: INTERNAL MEDICINE

## 2024-12-02 PROCEDURE — 84443 ASSAY THYROID STIM HORMONE: CPT

## 2024-12-02 PROCEDURE — 72125 CT NECK SPINE W/O DYE: CPT

## 2024-12-02 PROCEDURE — 70450 CT HEAD/BRAIN W/O DYE: CPT

## 2024-12-02 PROCEDURE — 93005 ELECTROCARDIOGRAM TRACING: CPT

## 2024-12-02 PROCEDURE — 83690 ASSAY OF LIPASE: CPT

## 2024-12-02 PROCEDURE — 81001 URINALYSIS AUTO W/SCOPE: CPT

## 2024-12-02 PROCEDURE — 87811 SARS-COV-2 COVID19 W/OPTIC: CPT

## 2024-12-02 PROCEDURE — 84439 ASSAY OF FREE THYROXINE: CPT

## 2024-12-02 RX ORDER — ALBUTEROL SULFATE 90 UG/1
2 INHALANT RESPIRATORY (INHALATION) EVERY 6 HOURS PRN
Status: DISCONTINUED | OUTPATIENT
Start: 2024-12-02 | End: 2024-12-05 | Stop reason: HOSPADM

## 2024-12-02 RX ORDER — SODIUM CHLORIDE 9 MG/ML
3 INJECTION INTRAVENOUS
Status: DISCONTINUED | OUTPATIENT
Start: 2024-12-02 | End: 2024-12-05 | Stop reason: HOSPADM

## 2024-12-02 RX ORDER — FERROUS SULFATE 325(65) MG
325 TABLET ORAL
Status: DISCONTINUED | OUTPATIENT
Start: 2024-12-03 | End: 2024-12-05 | Stop reason: HOSPADM

## 2024-12-02 RX ORDER — FAMOTIDINE 20 MG/1
40 TABLET, FILM COATED ORAL
Status: DISCONTINUED | OUTPATIENT
Start: 2024-12-02 | End: 2024-12-05 | Stop reason: HOSPADM

## 2024-12-02 RX ORDER — ATENOLOL 50 MG/1
50 TABLET ORAL
Status: DISCONTINUED | OUTPATIENT
Start: 2024-12-02 | End: 2024-12-03

## 2024-12-02 RX ORDER — PRAVASTATIN SODIUM 20 MG
20 TABLET ORAL DAILY
Status: DISCONTINUED | OUTPATIENT
Start: 2024-12-03 | End: 2024-12-02

## 2024-12-02 RX ORDER — PRAVASTATIN SODIUM 20 MG
20 TABLET ORAL DAILY
Status: DISCONTINUED | OUTPATIENT
Start: 2024-12-02 | End: 2024-12-05 | Stop reason: HOSPADM

## 2024-12-02 RX ORDER — FERROUS SULFATE 325(65) MG
325 TABLET, DELAYED RELEASE (ENTERIC COATED) ORAL
COMMUNITY

## 2024-12-02 RX ORDER — MAGNESIUM SULFATE 1 G/100ML
1 INJECTION INTRAVENOUS ONCE
Status: COMPLETED | OUTPATIENT
Start: 2024-12-02 | End: 2024-12-02

## 2024-12-02 RX ORDER — ONDANSETRON 2 MG/ML
4 INJECTION INTRAMUSCULAR; INTRAVENOUS EVERY 6 HOURS PRN
Status: DISCONTINUED | OUTPATIENT
Start: 2024-12-02 | End: 2024-12-05 | Stop reason: HOSPADM

## 2024-12-02 RX ORDER — ACETAMINOPHEN 325 MG/1
650 TABLET ORAL EVERY 6 HOURS PRN
Status: DISCONTINUED | OUTPATIENT
Start: 2024-12-02 | End: 2024-12-05 | Stop reason: HOSPADM

## 2024-12-02 RX ORDER — PANTOPRAZOLE SODIUM 40 MG/1
40 TABLET, DELAYED RELEASE ORAL
Status: DISCONTINUED | OUTPATIENT
Start: 2024-12-03 | End: 2024-12-05 | Stop reason: HOSPADM

## 2024-12-02 RX ORDER — HEPARIN SODIUM 5000 [USP'U]/ML
5000 INJECTION, SOLUTION INTRAVENOUS; SUBCUTANEOUS EVERY 8 HOURS SCHEDULED
Status: DISCONTINUED | OUTPATIENT
Start: 2024-12-03 | End: 2024-12-05 | Stop reason: HOSPADM

## 2024-12-02 RX ORDER — ERGOCALCIFEROL 1.25 MG/1
50000 CAPSULE, LIQUID FILLED ORAL WEEKLY
COMMUNITY
Start: 2024-11-26

## 2024-12-02 RX ADMIN — PRAVASTATIN SODIUM 20 MG: 20 TABLET ORAL at 23:36

## 2024-12-02 RX ADMIN — FAMOTIDINE 40 MG: 20 TABLET, FILM COATED ORAL at 23:36

## 2024-12-02 RX ADMIN — MAGNESIUM SULFATE IN DEXTROSE 1 G: 10 INJECTION, SOLUTION INTRAVENOUS at 22:40

## 2024-12-02 RX ADMIN — ATENOLOL 50 MG: 50 TABLET ORAL at 23:36

## 2024-12-02 NOTE — ED PROVIDER NOTES
Time reflects when diagnosis was documented in both MDM as applicable and the Disposition within this note       Time User Action Codes Description Comment    12/2/2024  8:31 PM WardandreeJosie Add [R55] Syncope     12/2/2024  8:32 PM Josie Lemos Add [Z83.49] Family history of thyroid problem     12/2/2024  8:32 PM KristendanishJosie Remove [Z83.49] Family history of thyroid problem     12/2/2024  8:32 PM Wardandree Josie DARY Add [R10.9] Abdominal pain     12/2/2024  8:32 PM Wardandree Josie FOOTE Add [R07.9] Chest pain     12/2/2024  8:32 PM Ryantrevin Josie DARY Add [R06.02] Shortness of breath     12/2/2024  8:32 PM KristendanishJosie Add [R79.89] Low TSH level           ED Disposition       ED Disposition   Admit    Condition   Stable    Date/Time   Mon Dec 2, 2024  8:31 PM    Comment   Case was discussed with DIETER Rodríguez  and the patient's admission status was agreed to be Admission Status: observation status to the service of Dr. Rodríguez .               Assessment & Plan       Medical Decision Making  Differential diagnosis including PE, dysrhythmia, electrolyte abnormality, thyroid dysfunction, intestinal perforation  Patient presenting with multiple syncopal episodes since 1127.  Patient states that she had a colonoscopy on 11/27 and had a syncopal episode after the procedure.  Patient states that she was going to be evaluated but was not at that time.  Patient reports that she did not have any syncopal episodes but continued to feel poorly, short of breath when she lays down, with chest discomfort.  Patient states that today she had 3 syncopal episodes.  Patient states that she does not believe she hit her head but has a loss of time from when she was standing next to a door and laying on a couch.  Patient more awake oriented x 4, GCS 15.  Patient arriving tachycardic, hypertensive.  Has not taken her antihypertensive medications for feeling poorly.  Patient has no hypoxia upon arrival.  No respiratory  insufficiency.  No oxygen requirement.  Patient is high risk Wells, will check PE as well as abdomen pelvis CT scan due to recent instrumentation with colonoscopy to rule out perforation.  Heart score 7.  Initial troponin of 18 we will continue to trend.  Patient has sinus tachycardia.  Patient has known untreated thyroid dysfunction.  Rechecking thyroid level today.  BNP is mildly elevated.  Patient has a mild leukopenia, no anemia, no MARIA GUADALUPE, no gross electro abnormality.  Patient lipase within normal limits.  Pregnancy test is negative.  Discussed with CT ruslan Guerrero patient has significant thyroid dysfunction today and there is concern that if IV contrast is administered patient could go into thyroid storm, and it was discussed amongst radiologist and patient should not receive IV contrast in their opinion. Recommendation for VQ study. Will change study for dry scan, and admit for VQ study as there is concern for PE.   No acute findings on CT head and c-spine. Incidental findings discussed with the patient from CT c/a/p without contrast. Discussed admission today for multiple episodes of syncope. Discussed VQ study as well as there was a change in the original plan of care for CT pe study. HR improving while in room without intervention.   Discussed cased with CONTRERAS Rodríguez and patient admitted.     Amount and/or Complexity of Data Reviewed  Labs: ordered.  Radiology: ordered.    Risk  Prescription drug management.  Decision regarding hospitalization.             Medications   sodium chloride (PF) 0.9 % injection 3 mL (has no administration in time range)   magnesium sulfate IVPB (premix) SOLN 1 g (has no administration in time range)       ED Risk Strat Scores   HEART Risk Score      Flowsheet Row Most Recent Value   Heart Score Risk Calculator    History 2 Filed at: 12/02/2024 1500   ECG 1 Filed at: 12/02/2024 1500   Age 1 Filed at: 12/02/2024 1500   Risk Factors 2 Filed at: 12/02/2024 1500   Troponin 1 Filed at:  "12/02/2024 1500   HEART Score 7 Filed at: 12/02/2024 1500                               SBIRT 20yo+      Flowsheet Row Most Recent Value   Initial Alcohol Screen: US AUDIT-C     1. How often do you have a drink containing alcohol? 0 Filed at: 12/02/2024 1450   2. How many drinks containing alcohol do you have on a typical day you are drinking?  0 Filed at: 12/02/2024 1450   3a. Male UNDER 65: How often do you have five or more drinks on one occasion? 0 Filed at: 12/02/2024 1450   3b. FEMALE Any Age, or MALE 65+: How often do you have 4 or more drinks on one occassion? 0 Filed at: 12/02/2024 1450   Audit-C Score 0 Filed at: 12/02/2024 1450   TYLER: How many times in the past year have you...    Used an illegal drug or used a prescription medication for non-medical reasons? Never Filed at: 12/02/2024 1450            Wells' Criteria for PE      Flowsheet Row Most Recent Value   Wells' Criteria for PE    Clinical signs and symptoms of DVT 0 Filed at: 12/02/2024 1439   PE is primary diagnosis or equally likely 3 Filed at: 12/02/2024 1439   HR >100 1.5 Filed at: 12/02/2024 1439   Immobilization at least 3 days or Surgery in the previous 4 weeks 1.5 Filed at: 12/02/2024 1439   Previous, objectively diagnosed PE or DVT 0 Filed at: 12/02/2024 1439   Hemoptysis 0 Filed at: 12/02/2024 1439   Malignancy with treatment within 6 months or palliative 0 Filed at: 12/02/2024 1439   Wells' Criteria Total 6 Filed at: 12/02/2024 1439                        History of Present Illness       Chief Complaint   Patient presents with    Syncope     Pt was seen by primary provider and had syncopal episode. Pt reports just today having two syncopal episode. Pt reports \"primary thinks I have hyperthyroidism.\"       Past Medical History:   Diagnosis Date    Anemia     Anxiety and depression     Hyperlipidemia     Hypertension     Menorrhagia with regular cycle     Obesity     Reactive airway disease     Vitamin D deficiency       Past Surgical " History:   Procedure Laterality Date     SECTION      X2    MAMMO (HISTORICAL)  2020    TUBAL LIGATION      pt had embedded IUD removed and tubal ligation performed      Family History   Problem Relation Age of Onset    Early menopause Mother     Endometriosis Mother     Hypertension Mother     Hyperlipidemia Mother     Skin cancer Father     Stroke Father     Coronary artery disease Father     Hypertension Father     Hyperlipidemia Father     Colon cancer Maternal Grandmother     Hypertension Maternal Grandmother     Hypertension Maternal Grandfather     Hypertension Paternal Grandmother     Hypertension Paternal Grandfather     Hypertension Maternal Aunt     Hypertension Maternal Uncle     Hypertension Paternal Aunt     Hypertension Paternal Uncle     Breast cancer Neg Hx     Ovarian cancer Neg Hx     Uterine cancer Neg Hx       Social History     Tobacco Use    Smoking status: Never    Smokeless tobacco: Never   Vaping Use    Vaping status: Never Used   Substance Use Topics    Alcohol use: Yes     Comment: occasional    Drug use: Never      E-Cigarette/Vaping    E-Cigarette Use Never User       E-Cigarette/Vaping Substances    Nicotine No     THC No     CBD No     Flavoring No     Other No     Unknown No       I have reviewed and agree with the history as documented.     Patient is a 44-year-old female with past medical history of hypertension arriving for evaluation of syncope. Patient presenting with multiple syncopal episodes since .  Patient states that she had a colonoscopy on  and had a syncopal episode after the procedure.  Patient states that she was going to be evaluated but was not at that time.  Patient reports that she did not have any syncopal episodes but continued to feel poorly, short of breath when she lays down, with chest discomfort.  Patient states that today she had 3 syncopal episodes.  Patient states that she does not believe she hit her head but has a loss of time  "from when she was standing next to a door and laying on a couch.  Patient more awake oriented x 4, GCS 15.  Patient arriving tachycardic, hypertensive.  Has not taken her antihypertensive medications for feeling poorly.  Patient has no hypoxia upon arrival.  No respiratory insufficiency.  No oxygen requirement.  Patient states that she was feeling \"off,\" prior to her colonoscopy for the previous week.  Patient reports that she was being followed by her PCP at that time.  Patient reports that she was having a colonoscopy/endoscopy secondary to left upper quadrant pain and.  Patient reports that her abdominal pain is no worse than it normally is at its baseline.        Review of Systems   Constitutional:  Positive for activity change.   HENT: Negative.     Eyes: Negative.    Respiratory:  Positive for shortness of breath.    Cardiovascular:  Positive for chest pain.   Gastrointestinal:  Positive for abdominal pain and nausea.   Endocrine: Negative.    Genitourinary: Negative.    Musculoskeletal: Negative.    Skin: Negative.    Allergic/Immunologic: Negative.    Neurological:  Positive for syncope and light-headedness. Negative for dizziness.   Hematological: Negative.    All other systems reviewed and are negative.          Objective       ED Triage Vitals [12/02/24 1403]   Temperature Pulse Blood Pressure Respirations SpO2 Patient Position - Orthostatic VS   98.6 °F (37 °C) (!) 126 (!) 228/128 18 97 % Sitting      Temp Source Heart Rate Source BP Location FiO2 (%) Pain Score    Temporal Monitor Left arm -- No Pain      Vitals      Date and Time Temp Pulse SpO2 Resp BP Pain Score FACES Pain Rating User   12/02/24 2324 -- 107 95 % -- 165/93 -- -- DII   12/02/24 2101 -- -- -- -- -- No Pain -- AS   12/02/24 2100 99.1 °F (37.3 °C) 115 96 % -- 189/103 -- -- DII   12/02/24 2047 -- -- -- -- -- No Pain -- AS   12/02/24 2030 -- 122 94 % 15 156/75 -- -- EW   12/02/24 2015 -- 117 94 % 20 -- -- -- EW   12/02/24 2000 -- 115 94 % " -- 154/72 -- -- EW   12/02/24 1945 -- 113 94 % 18 -- -- -- EW   12/02/24 1930 -- 112 94 % 20 144/65 -- -- EW   12/02/24 1915 -- 109 93 % 20 -- -- -- EW   12/02/24 1900 -- 107 95 % 15 141/69 -- -- EW   12/02/24 1845 -- 119 95 % -- -- -- -- EW   12/02/24 1830 -- 112 96 % 22 159/81 -- -- MB   12/02/24 1800 -- 116 95 % 16 179/79 -- -- RD   12/02/24 1730 -- 118 96 % 20 176/80 -- -- MB   12/02/24 1700 -- 113 97 % 17 155/75 -- -- MB   12/02/24 1602 -- -- -- 20 -- No Pain -- EW   12/02/24 1530 -- 119 95 % 22 186/83 -- -- EW   12/02/24 1500 -- 117 97 % 15 166/95 -- -- EW   12/02/24 1450 -- 116 -- -- -- -- -- EW   12/02/24 1445 -- 122 98 % 15 186/99 -- -- EW   12/02/24 1403 98.6 °F (37 °C) 126 97 % 18 228/128 No Pain -- CM            Physical Exam  Vitals and nursing note reviewed.   Constitutional:       Appearance: Normal appearance. She is normal weight.   HENT:      Head: Normocephalic.      Right Ear: External ear normal.      Left Ear: External ear normal.      Nose: Nose normal. No congestion.      Mouth/Throat:      Mouth: Mucous membranes are moist.      Pharynx: Oropharynx is clear. No oropharyngeal exudate.   Eyes:      Extraocular Movements: Extraocular movements intact.      Pupils: Pupils are equal, round, and reactive to light.   Cardiovascular:      Rate and Rhythm: Regular rhythm. Tachycardia present.      Pulses: Normal pulses.      Heart sounds: Normal heart sounds.   Pulmonary:      Effort: Pulmonary effort is normal.      Breath sounds: Normal breath sounds.   Abdominal:      General: Abdomen is flat. Bowel sounds are normal. There is no distension.      Palpations: Abdomen is soft.      Tenderness: There is abdominal tenderness.   Musculoskeletal:         General: No tenderness. Normal range of motion.      Cervical back: Normal range of motion and neck supple.   Skin:     General: Skin is warm.      Capillary Refill: Capillary refill takes less than 2 seconds.   Neurological:      General: No focal  "deficit present.      Mental Status: She is alert and oriented to person, place, and time. Mental status is at baseline.      GCS: GCS eye subscore is 4. GCS verbal subscore is 5. GCS motor subscore is 6.      Cranial Nerves: Cranial nerves 2-12 are intact.      Sensory: Sensation is intact.      Motor: Motor function is intact.      Coordination: Coordination is intact.      Gait: Gait is intact.      Comments: 5/5 upper extremity strength, no numbness or tingling   5/5 lower extremity strength, no numbness or tingling    Psychiatric:         Mood and Affect: Mood normal.         Behavior: Behavior normal.         Thought Content: Thought content normal.         Judgment: Judgment normal.         Results Reviewed       Procedure Component Value Units Date/Time    T4, free [[id number]]  (Abnormal) Collected: 12/02/24 1817    Lab Status: Final result Specimen: Blood from Arm, Right Updated: 12/02/24 2350     Free T4 3.95 ng/dL     Narrative:        \"Therapeutic range for patients medicated with thyroid disorders: 0.61-1.24 ng/dL.\"    HS Troponin I 4hr [[id number]]  (Normal) Collected: 12/02/24 2042    Lab Status: Final result Specimen: Blood from Arm, Right Updated: 12/02/24 2109     hs TnI 4hr 21 ng/L      Delta 4hr hsTnI 3 ng/L     HS Troponin I 2hr [[id number]]  (Normal) Collected: 12/02/24 1817    Lab Status: Final result Specimen: Blood from Arm, Right Updated: 12/02/24 1846     hs TnI 2hr 21 ng/L      Delta 2hr hsTnI 3 ng/L     POCT pregnancy, urine [[id number]]  (Normal) Collected: 12/02/24 1820    Lab Status: Final result Updated: 12/02/24 1820     EXT Preg Test, Ur Negative     Control Valid    Urine Microscopic [[id number]]  (Abnormal) Collected: 12/02/24 1601    Lab Status: Final result Specimen: Urine, Clean Catch Updated: 12/02/24 1714     RBC, UA 1-2 /hpf      WBC, UA 0-1 /hpf      Epithelial Cells Occasional /hpf      Bacteria, UA Moderate /hpf      MUCUS THREADS Occasional     Amorphous Crystals, UA " Occasional    Comprehensive metabolic panel [[id number]]  (Abnormal) Collected: 12/02/24 1602    Lab Status: Final result Specimen: Blood from Arm, Right Updated: 12/02/24 1700     Sodium 140 mmol/L      Potassium 4.9 mmol/L      Chloride 105 mmol/L      CO2 26 mmol/L      ANION GAP 9 mmol/L      BUN 8 mg/dL      Creatinine 0.44 mg/dL      Glucose 95 mg/dL      Calcium 10.3 mg/dL      AST 29 U/L      ALT 23 U/L      Alkaline Phosphatase 58 U/L      Total Protein 7.5 g/dL      Albumin 4.2 g/dL      Total Bilirubin 0.37 mg/dL      eGFR 121 ml/min/1.73sq m     Narrative:      National Kid[employer] Disease Foundation guidelines for Chronic Kidney Disease (CKD):     Stage 1 with normal or high GFR (GFR > 90 mL/min/1.73 square meters)    Stage 2 Mild CKD (GFR = 60-89 mL/min/1.73 square meters)    Stage 3A Moderate CKD (GFR = 45-59 mL/min/1.73 square meters)    Stage 3B Moderate CKD (GFR = 30-44 mL/min/1.73 square meters)    Stage 4 Severe CKD (GFR = 15-29 mL/min/1.73 square meters)    Stage 5 End Stage CKD (GFR <15 mL/min/1.73 square meters)  Note: GFR calculation is accurate only with a steady state creatinine    Lipase [[id number]]  (Normal) Collected: 12/02/24 1602    Lab Status: Final result Specimen: Blood from Arm, Right Updated: 12/02/24 1700     Lipase 25 u/L     Magnesium [[id number]]  (Abnormal) Collected: 12/02/24 1602    Lab Status: Final result Specimen: Blood from Arm, Right Updated: 12/02/24 1700     Magnesium 1.8 mg/dL     TSH, 3rd generation [[id number]]  (Abnormal) Collected: 12/02/24 1602    Lab Status: Final result Specimen: Blood from Arm, Right Updated: 12/02/24 1650     TSH 3RD GENERATON <0.010 uIU/mL     B-Type Natriuretic Peptide(BNP) [[id number]]  (Abnormal) Collected: 12/02/24 1602    Lab Status: Final result Specimen: Blood from Arm, Right Updated: 12/02/24 1640      pg/mL     HS Troponin 0hr (reflex protocol) [[id number]]  (Normal) Collected: 12/02/24 3355    Lab Status: Final result Specimen:  Blood from Arm, Right Updated: 12/02/24 1637     hs TnI 0hr 18 ng/L     UA w Reflex to Microscopic w Reflex to Culture [[id number]]  (Abnormal) Collected: 12/02/24 1601    Lab Status: Final result Specimen: Urine, Clean Catch Updated: 12/02/24 1618     Color, UA Yellow     Clarity, UA Slightly Cloudy     Specific Gravity, UA <1.005     pH, UA 6.0     Leukocytes, UA Negative     Nitrite, UA Negative     Protein, UA 30 (1+) mg/dl      Glucose, UA Negative mg/dl      Ketones, UA Negative mg/dl      Urobilinogen, UA <2.0 mg/dl      Bilirubin, UA Negative     Occult Blood, UA Large    FLU/COVID Rapid Antigen (30 min. TAT) - Preferred screening test in ED [[id number]]  (Normal) Collected: 12/02/24 1543    Lab Status: Final result Specimen: Nares from Nose Updated: 12/02/24 1613     SARS COV Rapid Antigen Negative     Influenza A Rapid Antigen Negative     Influenza B Rapid Antigen Negative    Narrative:      This test has been performed using the [employer] 2 FLU+SARS Antigen test under the Emergency Use Authorization (EUA). This test has been validated by the  and verified by the performing laboratory. The Zein[employer] uses lateral flow immunofluorescent sandwich assay to detect SARS-COV, Influenza A and Influenza B Antigen.     The [employer] 2 SARS Antigen test does not differentiate between SARS-CoV and SARS-CoV-2.     Negative results are presumptive and may be confirmed with a molecular assay, if necessary, for patient management. Negative results do not rule out SARS-CoV-2 or influenza infection and should not be used as the sole basis for treatment or patient management decisions. A negative test result may occur if the level of antigen in a sample is below the limit of detection of this test.     Positive results are indicative of the presence of viral antigens, but do not rule out bacterial infection or co-infection with other viruses.     All test results should be used as an adjunct to clinical  observations and other information available to the provider.    FOR PEDIATRIC PATIENTS - copy/paste COVID Guidelines URL to browser: [email or web address]    CBC and differential [[id number]]  (Abnormal) Collected: 12/02/24 1602    Lab Status: Final result Specimen: Blood from Arm, Right Updated: 12/02/24 1612     WBC 3.76 Thousand/uL      RBC 5.12 Million/uL      Hemoglobin 12.4 g/dL      Hematocrit 39.2 %      MCV 77 fL      MCH 24.2 pg      MCHC 31.6 g/dL      RDW 13.7 %      MPV 10.2 fL      Platelets 371 Thousands/uL      nRBC 0 /100 WBCs      Segmented % 47 %      Immature Grans % 1 %      Lymphocytes % 36 %      Monocytes % 15 %      Eosinophils Relative 1 %      Basophils Relative 0 %      Absolute Neutrophils 1.77 Thousands/µL      Absolute Immature Grans 0.02 Thousand/uL      Absolute Lymphocytes 1.36 Thousands/µL      Absolute Monocytes 0.55 Thousand/µL      Eosinophils Absolute 0.05 Thousand/µL      Basophils Absolute 0.01 Thousands/µL             CT head without contrast   Final Interpretation by Romaine Mohr MD (12/02 1919)      Within the confines of a motion degraded study, no acute intracranial hemorrhage, mass effect or midline shift.                  Workstation performed: [id number]         CT spine cervical without contrast   Final Interpretation by Romaine Mohr MD (12/02 1926)      No acute cervical spine fracture or traumatic malalignment.                  Workstation performed: [id number]         CT chest abdomen pelvis wo contrast   Final Interpretation by Anthony Sommer MD (12/02 1953)      Prominent coronary artery calcification for age. Coronary stenosis is possible.      Visceral abdominal obesity.      Relative enlargement of the uterus that may indicate uterine adenomyosis or fibroids.      Cholelithiasis without evidence of acute cholecystitis.      No acute traumatic injury of the chest, abdomen or pelvis.          Workstation performed: [id number]         X-ray chest 1 view portable   Final Interpretation by Lavon Ludwig MD (1523)      No acute cardiopulmonary disease.            Workstation performed: [id number]             ECG 12 Lead Documentation Only    Date/Time: 2024 3:47 PM    Performed by: PEDRO Alves  Authorized by: PEDRO Alves    Rate:     ECG rate:  122    ECG rate assessment: tachycardic    Rhythm:     Rhythm: sinus rhythm    Ectopy:     Ectopy: none    QRS:     QRS axis:  Normal  Conduction:     Conduction: normal    ST segments:     ST segments:  Non-specific  T waves:     T waves: non-specific        ED Medication and Procedure Management   Prior to Admission Medications   Prescriptions Last Dose Informant Patient Reported? Taking?   Pepcid 40 MG tablet Past Week Self Yes Yes   Si mg daily at bedtime   RABEprazole (ACIPHEX) 20 MG tablet 2024  No Yes   Sig: Take 1 tablet (20 mg total) by mouth daily   albuterol (VENTOLIN HFA) 90 mcg/act inhaler Past Week Self No Yes   Sig: Inhale 2 puffs every 6 (six) hours as needed for wheezing   atenolol (TENORMIN) 50 mg tablet Past Week Self Yes Yes   Sig: Take 1 tablet by mouth in the morning   Patient taking differently: Take 1 tablet by mouth in the morning Patient takes in evening   co-enzyme Q-10 50 MG capsule Past Week Self Yes Yes   Sig: Take 100 mg by mouth daily     ergocalciferol (VITAMIN D2) 50,000 units 2024  Yes Yes   Sig: Take 50,000 Units by mouth once a week Last taken    ferrous sulfate 325 (65 FE) MG EC tablet 2024  Yes Yes   Sig: Take 325 mg by mouth 3 (three) times a day with meals   pravastatin (PRAVACHOL) 20 mg tablet Past Week Self Yes Yes   Sig: Take 1 tablet by mouth in the morning Takes in evening   saccharomyces boulardii (FLORASTOR) 250 mg capsule Past Week Self Yes Yes   Sig: Take 250 mg by mouth in the morning      Facility-Administered Medications: None     Current  Discharge Medication List        CONTINUE these medications which have NOT CHANGED    Details   albuterol (VENTOLIN HFA) 90 mcg/act inhaler Inhale 2 puffs every 6 (six) hours as needed for wheezing  Qty: 18 g, Refills: 0    Associated Diagnoses: Cough      atenolol (TENORMIN) 50 mg tablet Take 1 tablet by mouth in the morning      co-enzyme Q-10 50 MG capsule Take 100 mg by mouth daily        ergocalciferol (VITAMIN D2) 50,000 units Take 50,000 Units by mouth once a week Last taken 12/1      ferrous sulfate 325 (65 FE) MG EC tablet Take 325 mg by mouth 3 (three) times a day with meals      Pepcid 40 MG tablet 40 mg daily at bedtime      pravastatin (PRAVACHOL) 20 mg tablet Take 1 tablet by mouth in the morning Takes in evening      RABEprazole (ACIPHEX) 20 MG tablet Take 1 tablet (20 mg total) by mouth daily  Qty: 30 tablet, Refills: 2    Associated Diagnoses: Gastroesophageal reflux disease, unspecified whether esophagitis present      saccharomyces boulardii (FLORASTOR) 250 mg capsule Take 250 mg by mouth in the morning           No discharge procedures on file.  ED SEPSIS DOCUMENTATION   Time reflects when diagnosis was documented in both MDM as applicable and the Disposition within this note       Time User Action Codes Description Comment    12/2/2024  8:31 PM Josie Lemos [R55] Syncope     12/2/2024  8:32 PM Josie Lemos [Z83.49] Family history of thyroid problem     12/2/2024  8:32 PM Josie Lemos Remove [Z83.49] Family history of thyroid problem     12/2/2024  8:32 PM Josie Lemos Add [R10.9] Abdominal pain     12/2/2024  8:32 PM Josie Lemos [R07.9] Chest pain     12/2/2024  8:32 PM Josie Lemos [R06.02] Shortness of breath     12/2/2024  8:32 PM Josie Lemos [R79.89] Low TSH level                  PEDRO Alves  12/02/24 2328

## 2024-12-03 ENCOUNTER — APPOINTMENT (OUTPATIENT)
Dept: NON INVASIVE DIAGNOSTICS | Facility: HOSPITAL | Age: 44
DRG: 644 | End: 2024-12-03
Payer: COMMERCIAL

## 2024-12-03 ENCOUNTER — APPOINTMENT (OUTPATIENT)
Dept: NUCLEAR MEDICINE | Facility: HOSPITAL | Age: 44
DRG: 644 | End: 2024-12-03
Payer: COMMERCIAL

## 2024-12-03 PROBLEM — I10 ESSENTIAL HYPERTENSION: Status: ACTIVE | Noted: 2024-12-03

## 2024-12-03 PROBLEM — E66.01 MORBID OBESITY (HCC): Status: ACTIVE | Noted: 2024-12-03

## 2024-12-03 LAB
ALBUMIN SERPL BCG-MCNC: 3.8 G/DL (ref 3.5–5)
ALP SERPL-CCNC: 51 U/L (ref 34–104)
ALT SERPL W P-5'-P-CCNC: 19 U/L (ref 7–52)
ANION GAP SERPL CALCULATED.3IONS-SCNC: 9 MMOL/L (ref 4–13)
AORTIC ROOT: 2.6 CM
AORTIC VALVE MEAN VELOCITY: 10.1 M/S
APICAL FOUR CHAMBER EJECTION FRACTION: 60 %
ASCENDING AORTA: 2.3 CM
AST SERPL W P-5'-P-CCNC: 26 U/L (ref 13–39)
AV AREA BY CONTINUOUS VTI: 3.1 CM2
AV AREA PEAK VELOCITY: 2.7 CM2
AV LVOT MEAN GRADIENT: 2 MMHG
AV LVOT PEAK GRADIENT: 5 MMHG
AV MEAN GRADIENT: 5 MMHG
AV PEAK GRADIENT: 9 MMHG
AV VALVE AREA: 3.08 CM2
AV VELOCITY RATIO: 0.77
BASOPHILS # BLD AUTO: 0.01 THOUSANDS/ÂΜL (ref 0–0.1)
BASOPHILS NFR BLD AUTO: 0 % (ref 0–1)
BILIRUB SERPL-MCNC: 0.39 MG/DL (ref 0.2–1)
BSA FOR ECHO PROCEDURE: 1.92 M2
BUN SERPL-MCNC: 9 MG/DL (ref 5–25)
CALCIUM SERPL-MCNC: 9.4 MG/DL (ref 8.4–10.2)
CHLORIDE SERPL-SCNC: 107 MMOL/L (ref 96–108)
CO2 SERPL-SCNC: 21 MMOL/L (ref 21–32)
CREAT SERPL-MCNC: 0.48 MG/DL (ref 0.6–1.3)
DOP CALC AO PEAK VEL: 1.49 M/S
DOP CALC AO VTI: 28.79 CM
DOP CALC LVOT AREA: 3.46 CM2
DOP CALC LVOT CARDIAC INDEX: 4.53 L/MIN/M2
DOP CALC LVOT CARDIAC OUTPUT: 8.7 L/MIN
DOP CALC LVOT DIAMETER: 2.1 CM
DOP CALC LVOT PEAK VEL VTI: 25.64 CM
DOP CALC LVOT PEAK VEL: 1.15 M/S
DOP CALC LVOT STROKE INDEX: 44.3 ML/M2
DOP CALC LVOT STROKE VOLUME: 88.76
E WAVE DECELERATION TIME: 169 MS
E/A RATIO: 0.93
EOSINOPHIL # BLD AUTO: 0.01 THOUSAND/ÂΜL (ref 0–0.61)
EOSINOPHIL NFR BLD AUTO: 0 % (ref 0–6)
ERYTHROCYTE [DISTWIDTH] IN BLOOD BY AUTOMATED COUNT: 14.3 % (ref 11.6–15.1)
FERRITIN SERPL-MCNC: 14 NG/ML (ref 11–307)
FRACTIONAL SHORTENING: 32 (ref 28–44)
GFR SERPL CREATININE-BSD FRML MDRD: 118 ML/MIN/1.73SQ M
GLUCOSE SERPL-MCNC: 138 MG/DL (ref 65–140)
HCT VFR BLD AUTO: 40.5 % (ref 34.8–46.1)
HGB BLD-MCNC: 11.6 G/DL (ref 11.5–15.4)
IMM GRANULOCYTES # BLD AUTO: 0.01 THOUSAND/UL (ref 0–0.2)
IMM GRANULOCYTES NFR BLD AUTO: 0 % (ref 0–2)
INTERVENTRICULAR SEPTUM IN DIASTOLE (PARASTERNAL SHORT AXIS VIEW): 1.2 CM
INTERVENTRICULAR SEPTUM: 1.2 CM (ref 0.6–1.1)
IRON SATN MFR SERPL: 14 % (ref 15–50)
IRON SERPL-MCNC: 46 UG/DL (ref 50–212)
LAAS-AP4: 21.3 CM2
LEFT ATRIUM SIZE: 3.5 CM
LEFT INTERNAL DIMENSION IN SYSTOLE: 2.6 CM (ref 2.1–4)
LEFT VENTRICLE DIASTOLIC VOLUME (MOD BIPLANE): 100 ML
LEFT VENTRICLE DIASTOLIC VOLUME INDEX (MOD BIPLANE): 52.1 ML/M2
LEFT VENTRICLE SYSTOLIC VOLUME (MOD BIPLANE): 70 ML
LEFT VENTRICLE SYSTOLIC VOLUME INDEX (MOD BIPLANE): 36.5 ML/M2
LEFT VENTRICULAR INTERNAL DIMENSION IN DIASTOLE: 3.8 CM (ref 3.5–6)
LEFT VENTRICULAR POSTERIOR WALL IN END DIASTOLE: 1.4 CM
LEFT VENTRICULAR STROKE VOLUME: 36 ML
LVSV (TEICH): 36 ML
LYMPHOCYTES # BLD AUTO: 0.67 THOUSANDS/ÂΜL (ref 0.6–4.47)
LYMPHOCYTES NFR BLD AUTO: 17 % (ref 14–44)
MAGNESIUM SERPL-MCNC: 2.2 MG/DL (ref 1.9–2.7)
MCH RBC QN AUTO: 23.6 PG (ref 26.8–34.3)
MCHC RBC AUTO-ENTMCNC: 28.6 G/DL (ref 31.4–37.4)
MCV RBC AUTO: 82 FL (ref 82–98)
MONOCYTES # BLD AUTO: 0.21 THOUSAND/ÂΜL (ref 0.17–1.22)
MONOCYTES NFR BLD AUTO: 5 % (ref 4–12)
MV E'TISSUE VEL-LAT: 12 CM/S
MV E'TISSUE VEL-SEP: 9 CM/S
MV PEAK A VEL: 0.94 M/S
MV PEAK E VEL: 87 CM/S
MV STENOSIS PRESSURE HALF TIME: 49 MS
MV VALVE AREA P 1/2 METHOD: 4.49
NEUTROPHILS # BLD AUTO: 3.15 THOUSANDS/ÂΜL (ref 1.85–7.62)
NEUTS SEG NFR BLD AUTO: 78 % (ref 43–75)
NRBC BLD AUTO-RTO: 0 /100 WBCS
PLATELET # BLD AUTO: 231 THOUSANDS/UL (ref 149–390)
PMV BLD AUTO: 11.5 FL (ref 8.9–12.7)
POTASSIUM SERPL-SCNC: 4.9 MMOL/L (ref 3.5–5.3)
PROT SERPL-MCNC: 6.7 G/DL (ref 6.4–8.4)
RBC # BLD AUTO: 4.92 MILLION/UL (ref 3.81–5.12)
RIGHT ATRIUM AREA SYSTOLE A4C: 13.3 CM2
RIGHT VENTRICLE ID DIMENSION: 3 CM
SL CV LV EF: 65
SL CV PED ECHO LEFT VENTRICLE DIASTOLIC VOLUME (MOD BIPLANE) 2D: 61 ML
SL CV PED ECHO LEFT VENTRICLE SYSTOLIC VOLUME (MOD BIPLANE) 2D: 25 ML
SODIUM SERPL-SCNC: 137 MMOL/L (ref 135–147)
T3 SERPL-MCNC: 3.1 NG/ML (ref 0.9–1.8)
TIBC SERPL-MCNC: 339 UG/DL (ref 250–450)
TRICUSPID ANNULAR PLANE SYSTOLIC EXCURSION: 2.4 CM
UIBC SERPL-MCNC: 293 UG/DL (ref 155–355)
WBC # BLD AUTO: 4.06 THOUSAND/UL (ref 4.31–10.16)

## 2024-12-03 PROCEDURE — A9558 XE133 XENON 10MCI: HCPCS

## 2024-12-03 PROCEDURE — 99222 1ST HOSP IP/OBS MODERATE 55: CPT | Performed by: INTERNAL MEDICINE

## 2024-12-03 PROCEDURE — 83550 IRON BINDING TEST: CPT | Performed by: INTERNAL MEDICINE

## 2024-12-03 PROCEDURE — 99204 OFFICE O/P NEW MOD 45 MIN: CPT | Performed by: INTERNAL MEDICINE

## 2024-12-03 PROCEDURE — 84445 ASSAY OF TSI GLOBULIN: CPT | Performed by: INTERNAL MEDICINE

## 2024-12-03 PROCEDURE — 80053 COMPREHEN METABOLIC PANEL: CPT | Performed by: INTERNAL MEDICINE

## 2024-12-03 PROCEDURE — 83735 ASSAY OF MAGNESIUM: CPT | Performed by: INTERNAL MEDICINE

## 2024-12-03 PROCEDURE — 85025 COMPLETE CBC W/AUTO DIFF WBC: CPT | Performed by: INTERNAL MEDICINE

## 2024-12-03 PROCEDURE — 83520 IMMUNOASSAY QUANT NOS NONAB: CPT | Performed by: INTERNAL MEDICINE

## 2024-12-03 PROCEDURE — 93306 TTE W/DOPPLER COMPLETE: CPT

## 2024-12-03 PROCEDURE — 78582 LUNG VENTILAT&PERFUS IMAGING: CPT

## 2024-12-03 PROCEDURE — 93306 TTE W/DOPPLER COMPLETE: CPT | Performed by: INTERNAL MEDICINE

## 2024-12-03 PROCEDURE — 84480 ASSAY TRIIODOTHYRONINE (T3): CPT | Performed by: INTERNAL MEDICINE

## 2024-12-03 PROCEDURE — A9540 TC99M MAA: HCPCS

## 2024-12-03 PROCEDURE — 83540 ASSAY OF IRON: CPT | Performed by: INTERNAL MEDICINE

## 2024-12-03 PROCEDURE — 82728 ASSAY OF FERRITIN: CPT | Performed by: INTERNAL MEDICINE

## 2024-12-03 RX ORDER — PROPYLTHIOURACIL 50 MG/1
500 TABLET ORAL ONCE
Status: COMPLETED | OUTPATIENT
Start: 2024-12-03 | End: 2024-12-03

## 2024-12-03 RX ORDER — HYDROCORTISONE SODIUM SUCCINATE 100 MG/2ML
100 INJECTION INTRAMUSCULAR; INTRAVENOUS EVERY 8 HOURS
Status: DISCONTINUED | OUTPATIENT
Start: 2024-12-03 | End: 2024-12-04

## 2024-12-03 RX ORDER — PROPRANOLOL HCL 20 MG
60 TABLET ORAL EVERY 6 HOURS SCHEDULED
Status: DISCONTINUED | OUTPATIENT
Start: 2024-12-03 | End: 2024-12-05 | Stop reason: ALTCHOICE

## 2024-12-03 RX ORDER — HYDROCORTISONE SODIUM SUCCINATE 100 MG/2ML
300 INJECTION INTRAMUSCULAR; INTRAVENOUS ONCE
Status: COMPLETED | OUTPATIENT
Start: 2024-12-03 | End: 2024-12-03

## 2024-12-03 RX ORDER — PROPYLTHIOURACIL 50 MG/1
200 TABLET ORAL EVERY 4 HOURS
Status: DISCONTINUED | OUTPATIENT
Start: 2024-12-03 | End: 2024-12-04

## 2024-12-03 RX ORDER — SODIUM CHLORIDE 9 MG/ML
75 INJECTION, SOLUTION INTRAVENOUS CONTINUOUS
Status: DISCONTINUED | OUTPATIENT
Start: 2024-12-03 | End: 2024-12-03

## 2024-12-03 RX ADMIN — PROPRANOLOL HYDROCHLORIDE 60 MG: 20 TABLET ORAL at 05:18

## 2024-12-03 RX ADMIN — PROPYLTHIOURACIL 200 MG: 50 TABLET ORAL at 05:16

## 2024-12-03 RX ADMIN — PROPYLTHIOURACIL 200 MG: 50 TABLET ORAL at 21:29

## 2024-12-03 RX ADMIN — HYDROCORTISONE SODIUM SUCCINATE 100 MG: 100 INJECTION, POWDER, FOR SOLUTION INTRAMUSCULAR; INTRAVENOUS at 08:04

## 2024-12-03 RX ADMIN — PROPYLTHIOURACIL 200 MG: 50 TABLET ORAL at 14:10

## 2024-12-03 RX ADMIN — PROPRANOLOL HYDROCHLORIDE 60 MG: 20 TABLET ORAL at 11:17

## 2024-12-03 RX ADMIN — PROPRANOLOL HYDROCHLORIDE 60 MG: 20 TABLET ORAL at 17:26

## 2024-12-03 RX ADMIN — FERROUS SULFATE TAB 325 MG (65 MG ELEMENTAL FE) 325 MG: 325 (65 FE) TAB at 08:04

## 2024-12-03 RX ADMIN — HEPARIN SODIUM 5000 UNITS: 5000 INJECTION, SOLUTION INTRAVENOUS; SUBCUTANEOUS at 14:10

## 2024-12-03 RX ADMIN — HYDROCORTISONE SODIUM SUCCINATE 300 MG: 100 INJECTION, POWDER, FOR SOLUTION INTRAMUSCULAR; INTRAVENOUS at 01:13

## 2024-12-03 RX ADMIN — PANTOPRAZOLE SODIUM 40 MG: 40 TABLET, DELAYED RELEASE ORAL at 05:16

## 2024-12-03 RX ADMIN — PROPYLTHIOURACIL 200 MG: 50 TABLET ORAL at 17:25

## 2024-12-03 RX ADMIN — PROPYLTHIOURACIL 200 MG: 50 TABLET ORAL at 08:11

## 2024-12-03 RX ADMIN — PROPYLTHIOURACIL 500 MG: 50 TABLET ORAL at 01:24

## 2024-12-03 RX ADMIN — FERROUS SULFATE TAB 325 MG (65 MG ELEMENTAL FE) 325 MG: 325 (65 FE) TAB at 11:17

## 2024-12-03 RX ADMIN — HYDROCORTISONE SODIUM SUCCINATE 100 MG: 100 INJECTION, POWDER, FOR SOLUTION INTRAMUSCULAR; INTRAVENOUS at 17:25

## 2024-12-03 RX ADMIN — FAMOTIDINE 40 MG: 20 TABLET, FILM COATED ORAL at 21:30

## 2024-12-03 RX ADMIN — HEPARIN SODIUM 5000 UNITS: 5000 INJECTION, SOLUTION INTRAVENOUS; SUBCUTANEOUS at 21:29

## 2024-12-03 RX ADMIN — FERROUS SULFATE TAB 325 MG (65 MG ELEMENTAL FE) 325 MG: 325 (65 FE) TAB at 17:26

## 2024-12-03 RX ADMIN — IRON SUCROSE 200 MG: 20 INJECTION, SOLUTION INTRAVENOUS at 14:10

## 2024-12-03 RX ADMIN — HEPARIN SODIUM 5000 UNITS: 5000 INJECTION, SOLUTION INTRAVENOUS; SUBCUTANEOUS at 05:16

## 2024-12-03 RX ADMIN — PROPRANOLOL HYDROCHLORIDE 60 MG: 20 TABLET ORAL at 01:18

## 2024-12-03 NOTE — ASSESSMENT & PLAN NOTE
Lab work from PCP visit on 11/25 reveled T3 < 0.005, Free T4 3.95.  Repeat lab work on admission: T3 <0.010, Free T4 3.95, T3 Total 3.1.  Patient seen by endocrinology and began treatment for hyperthyroidism.  Management per endocrinology

## 2024-12-03 NOTE — CONSULTS
"Consultation - Cardiology   Name: Chen Peace 44 y.o. female I MRN: [id number]  Unit/Bed#: -01 I Date of Admission: 12/2/2024   Date of Service: 12/3/2024 I Hospital Day: 0   Inpatient consult to Cardiology  Consult performed by: PEDRO Bella  Consult ordered by: Flaquito Brown MD      Physician Requesting Evaluation: Flaquito Brown MD   Reason for Evaluation / Principal Problem: Syncope     Assessment & Plan  Syncope  Patient reports not \"feeling herself: for over a month with vague symptoms along with fatigue, weight loss, nausea, abdominal discomfort and reflux.  Seen by her PCP and EGD/Colonoscopy was ordered along with lab work. EGD/Colonoscopy performed on 11/27 and patient reports having a syncopal episode upon discharge.  She was brought to the ED but was not seen at that time.  Patient reports on 12/2 while getting ready to go to work felt pre-syncopal and \"came to\" lying on her couch.  This prompted a phone call and visit to her primary care where she had another syncopal episode.  She was then transported to the ER for further evaluation.      Hospital workup   TTE 12/03/24:  EF 65-70%,  EKG: Sinus Tachycardia  Telemetry: SR currently , no evidence of pauses, ectopy   V/Q scan: normal   CT abd/pelvis: no acute process, prominent coronary artery calcification    CT head: no acute process   Low TSH level  Lab work from PCP visit on 11/25 reveled T3 < 0.005, Free T4 3.95.  Repeat lab work on admission: T3 <0.010, Free T4 3.95, T3 Total 3.1.  Patient seen by endocrinology and began treatment for hyperthyroidism.  Management per endocrinology   Tachycardia  In setting of hypothyroidism - patients HR  initially 120's - started on propanolol by endocrinology   Essential hypertension  Historical - patient on atenolol 50 mg daily   Hypertensive on arrival 228/128  Atenolol Dc'd and patient started on propanolol with dx of hyperthyroidism, BP overall better; " "130-150's/70-90's    Recommendations/Plan  Continue with telemetry while in hospital  Zio monitor will be ordered at time of discharge  Patient will f/u with cardiology outpatient     History of Present Illness   Chen Peace is a 44 y.o. female with a past medical history of hyperlipidemia, hypertension who presents after multiple syncopal episodes.    Patient reports not \"feeling herself: for over a month with vague symptoms along with fatigue, weight loss, nausea, abdominal discomfort and reflux.  Seen by her PCP and EGD/Colonoscopy was ordered along with lab work. EGD/Colonoscopy performed on 11/27 and patient reports having a syncopal episode upon discharge.  She was brought to the ED but was not seen at that time.  Patient reports on 12/2 while getting ready to go to work felt pre-syncopal and \"came to\" lying on her couch.  This prompted a phone call and visit to her primary care where she had another syncopal episode.  She was then transported to the ER for further evaluation.      Lab work performed by her PCP on 11/25 reveled TSH <0.005 and free T4 of 4.12, repeat labs on admission with TSH < 0.010, free T4 of 3.95.  Endocrinology consulted and patient began treatment for thyroid storm with propylthiouracil, hydrocortisone, and propranolol.    Patient currently resting, sitting upright in a chair.  She endorses palpitations but denies CP/Pressure, leg swelling, lightheadedness, dizziness at this time.       Review of Systems   Constitutional:  Positive for activity change and fatigue.   Respiratory:  Negative for cough, chest tightness and shortness of breath.    Cardiovascular:  Positive for palpitations. Negative for chest pain and leg swelling.   Gastrointestinal:  Positive for abdominal pain, nausea and vomiting. Negative for anal bleeding and blood in stool.   Genitourinary:  Negative for difficulty urinating and hematuria.   Musculoskeletal:  Negative for arthralgias, joint swelling and myalgias. "   Neurological:  Positive for dizziness, syncope, light-headedness, numbness and headaches.     I have reviewed the patient's PMH, PSH, Social History, Family History, Meds, and Allergies    Objective :  Temp:  [98 °F (36.7 °C)-99.1 °F (37.3 °C)] 98 °F (36.7 °C)  HR:  [] 93  BP: (131-189)/() 158/85  Resp:  [15-22] 18  SpO2:  [93 %-98 %] 94 %  O2 Device: None (Room air)  Orthostatic Blood Pressures      Flowsheet Row Most Recent Value   Blood Pressure 158/85 filed at 12/03/2024 1309   Patient Position - Orthostatic VS Sitting filed at 12/02/2024 2100          First Weight: Weight - Scale: 100 kg (221 lb) (12/02/24 1403)  Vitals:    12/03/24 0100 12/03/24 1309   Weight: 99.8 kg (220 lb) 99.8 kg (220 lb 0.3 oz)       Physical Exam  Constitutional:       Appearance: Normal appearance.   HENT:      Head: Normocephalic and atraumatic.      Mouth/Throat:      Mouth: Mucous membranes are moist.   Cardiovascular:      Rate and Rhythm: Normal rate and regular rhythm.      Pulses: Normal pulses.      Heart sounds: Normal heart sounds.   Pulmonary:      Effort: Pulmonary effort is normal.      Breath sounds: Normal breath sounds.   Abdominal:      General: Bowel sounds are normal.      Palpations: Abdomen is soft.   Musculoskeletal:      Right lower leg: No edema.      Left lower leg: No edema.   Skin:     General: Skin is warm and dry.      Capillary Refill: Capillary refill takes less than 2 seconds.   Neurological:      General: No focal deficit present.      Mental Status: She is alert and oriented to person, place, and time.         Lab Results: I have reviewed the following results:  Results from last 7 days   Lab Units 12/03/24  0437 12/02/24  1602   WBC Thousand/uL 4.06* 3.76*   HEMOGLOBIN g/dL 11.6 12.4   HEMATOCRIT % 40.5 39.2   PLATELETS Thousands/uL 231 371     Results from last 7 days   Lab Units 12/03/24  0437 12/02/24  1602   POTASSIUM mmol/L 4.9 4.9   CHLORIDE mmol/L 107 105   CO2 mmol/L 21 26   BUN  "mg/dL 9 8   CREATININE mg/dL 0.48* 0.44*   CALCIUM mg/dL 9.4 10.3*         No results found for: \"HGBA1C\"  No results found for: \"CKTOTAL\", \"CKMB\", \"CKMBINDEX\", \"TROPONINI\"    Imaging Results Review: I reviewed radiology reports from this admission including: xray(s) and Echocardiogram.  Other Study Results Review: EKG was reviewed.     VTE Prophylaxis: VTE covered by:  heparin (porcine), Subcutaneous, 5,000 Units at 12/03/24 1410         "

## 2024-12-03 NOTE — PHYSICAL THERAPY NOTE
PHYSICAL THERAPY SCREEN NOTE      Patient Name: Chen Peace  Today's Date: 12/3/2024       12/03/24 1224   Note Type   Note type Screen   Additional Comments PT orders received, chart review performed. Spoke w/ JOLYNN Fajardo, patient w/ [facility]. Pt is independent w/ mobility, only standby at this time due to acute hx of syncope. Pt w/ no functional mobility deficits, no acute PT needs, will DC PT       Nicole Orta, PT, DPT

## 2024-12-03 NOTE — CASE MANAGEMENT
Case Management Assessment & Discharge Planning Note    Patient name Chen Peace  Location /-01 MRN [id number]  : 1980 Date 12/3/2024       Current Admission Date: 2024  Current Admission Diagnosis:Syncope   Patient Active Problem List    Diagnosis Date Noted Date Diagnosed    Essential hypertension 2024     Morbid obesity (HCC) 2024     Low TSH level 2024     Tachycardia 2024     Syncope 2024     Gastroesophageal reflux disease 2024     BMI 45.0-49.9, adult (HCC) 2024       LOS (days): 0  Geometric Mean LOS (GMLOS) (days):   Days to GMLOS:     OBJECTIVE:              Current admission status: Observation       Preferred Pharmacy:   [facility] [address] [address]  Phone: [phone] Fax: [phone]    Primary Care Provider: Yuki Noble MD    Primary Insurance: BLUE CROSS  Secondary Insurance:     ASSESSMENT:  Active Health Care Proxies       Tyron Peace Health Care Representative - Spouse   Primary Phone: [phone] (Mobile)  Home Phone: [phone]                 Advance Directives  Does patient have a Health Care POA?: No  Was patient offered paperwork?: Yes  Does patient currently have a Health Care decision maker?: Yes, please see Health Care Proxy section  Does patient have Advance Directives?: No  Was patient offered paperwork?: Yes  Primary Contact: Tyron Peace, spouse.         Readmission Root Cause  30 Day Readmission: No    Patient Information  Admitted from:: Home  Mental Status: Alert  During Assessment patient was accompanied by: Not accompanied during assessment  Assessment information provided by:: Patient  Primary Caregiver: Self  Support Systems: Self, Spouse/significant other, Son, Family members, Friend  County of Residence: [place]  What city do you live in?: [place]  Home entry access options. Select all that apply.: Stairs  Number of  steps to enter home.: 5 (3-5, depending on where pt enters home.)  Type of Current Residence: 3 story home  Upon entering residence, is there a bedroom on the main floor (no further steps)?: No  A bedroom is located on the following floor levels of residence (select all that apply):: 2nd Floor  Upon entering residence, is there a bathroom on the main floor (no further steps)?: Yes  Number of steps to 2nd floor from main floor: One Flight  Living Arrangements: Lives w/ Spouse/significant other, Lives w/ Children  Is patient a ?: No    Activities of Daily Living Prior to Admission  Functional Status: Independent  Completes ADLs independently?: Yes  Ambulates independently?: Yes  Does patient use assisted devices?: No  Does patient currently own DME?: No  Does patient have a history of Outpatient Therapy (PT/OT)?: Yes  Does the patient have a history of Short-Term Rehab?: No  Does patient have a history of HHC?: Yes (After pregnancy.)  Does patient currently have HHC?: No         Patient Information Continued  Income Source: Employed  Does patient have prescription coverage?: Yes  Does patient receive dialysis treatments?: No  Does patient have a history of substance abuse?: No  Does patient have a history of Mental Health Diagnosis?: No         Means of Transportation  Means of Transport to Appts:: Drives Self          DISCHARGE DETAILS:    Discharge planning discussed with:: Pt  Freedom of Choice: Yes     CM contacted family/caregiver?: No- see comments (Pt is in contact with family.)  Were Treatment Team discharge recommendations reviewed with patient/caregiver?: Yes  Did patient/caregiver verbalize understanding of patient care needs?: Yes  Were patient/caregiver advised of the risks associated with not following Treatment Team discharge recommendations?: Yes         Requested Home Health Care         Is the patient interested in HHC at discharge?: No    DME Referral Provided  Referral made for DME?:  No    Other Referral/Resources/Interventions Provided:  Interventions: None Indicated         Treatment Team Recommendation: Home  Discharge Destination Plan:: Home                                         Additional Comments: CM met with pt at bedside to discern discharge needs. Pt lives with spouse, son, and son's SO in a 3sh, 3-5 MITCHELL, bedroom upstairs, bath on main floor. Reports being independent with walking and adls PTA. Does not use DME. Employed and drives. Hx of OPPT and HHC. No hx of STR. No inpatient psych or D&A treatment. Does not have medical POA. Provided information on how to obtain one. [facility] [place] [facility] preferred. Spouse will be transport home at discharge.

## 2024-12-03 NOTE — PLAN OF CARE
Problem: SAFETY ADULT  Goal: Patient will remain free of falls  Description: INTERVENTIONS:  - Educate patient/family on patient safety including physical limitations  - Instruct patient to call for assistance with activity   - Consult OT/PT to assist with strengthening/mobility   - Keep Call bell within reach  - Keep bed low and locked with side rails adjusted as appropriate  - Keep care items and personal belongings within reach  - Initiate and maintain comfort rounds  - Make Fall Risk Sign visible to staff  - Offer Toileting every 2 Hours, in advance of need  - Initiate/Maintain 2alarm  - Obtain necessary fall risk management equipment: 2  - Apply yellow socks and bracelet for high fall risk patients  - Consider moving patient to room near nurses station  Outcome: Progressing     Problem: SAFETY ADULT  Goal: Maintain or return to baseline ADL function  Description: INTERVENTIONS:  -  Assess patient's ability to carry out ADLs; assess patient's baseline for ADL function and identify physical deficits which impact ability to perform ADLs (bathing, care of mouth/teeth, toileting, grooming, dressing, etc.)  - Assess/evaluate cause of self-care deficits   - Assess range of motion  - Assess patient's mobility; develop plan if impaired  - Assess patient's need for assistive devices and provide as appropriate  - Encourage maximum independence but intervene and supervise when necessary  - Involve family in performance of ADLs  - Assess for home care needs following discharge   - Consider OT consult to assist with ADL evaluation and planning for discharge  - Provide patient education as appropriate  Outcome: Progressing

## 2024-12-03 NOTE — ASSESSMENT & PLAN NOTE
Presents due to multiple syncopal episodes since 11/27. Had colonoscopy on 11/27   CT head unremarkable.  Small suspicion for PE. However TSH level undetectable. Unable to have contrast study with risk of causing thyroid storm.  TSH < 0.0010   3.95  Trop 18, 21    EKG sinus tachycardia   Mag 1.8  Plan    Obtain VQ   Obtain ECHO   Replete magnesium   Telemetry

## 2024-12-03 NOTE — ASSESSMENT & PLAN NOTE
Historical - patient on atenolol 50 mg daily   Hypertensive on arrival 228/128  Atenolol Dc'd and patient started on propanolol with dx of hyperthyroidism, BP overall better; 130-150's/70-90's

## 2024-12-03 NOTE — ASSESSMENT & PLAN NOTE
-120s   Trop WNL   EKG: sinus tachycardia   Has ongoing palpitations. Denies chest pain.   Propranolol 60 mg q 6 hours   Telemetry

## 2024-12-03 NOTE — ASSESSMENT & PLAN NOTE
In setting of hypothyroidism - patients HR  initially 120's - started on propanolol by endocrinology

## 2024-12-03 NOTE — OCCUPATIONAL THERAPY NOTE
Occupational Therapy Screen Note        Patient Name: Chen Peace  Today's Date: 12/3/2024         12/03/24 0813   OT Last Visit   OT Visit Date 12/03/24   Note Type   Note type Screen   Additional Comments OT orders received and chart review completed. Pt with current AMPAC of 23. Spoke with JOLYNN Fajardo and pt is standby within the room 2* to syncope and is othwise independent. Pt with no acute OT needs. Will d/c orders at this time. Please reconsult if change in status.     Zhane Simons, OTR/L

## 2024-12-03 NOTE — PLAN OF CARE
Problem: SAFETY ADULT  Goal: Patient will remain free of falls  Description: INTERVENTIONS:  - Educate patient/family on patient safety including physical limitations  - Instruct patient to call for assistance with activity   - Consult OT/PT to assist with strengthening/mobility   - Keep Call bell within reach  - Keep bed low and locked with side rails adjusted as appropriate  - Keep care items and personal belongings within reach  - Initiate and maintain comfort rounds  - Make Fall Risk Sign visible to staff  - Offer Toileting every 2 Hours, in advance of need  - Initiate/Maintain bed alarm  - Obtain necessary fall risk management equipment:   - Apply yellow socks and bracelet for high fall risk patients  - Consider moving patient to room near nurses station  Outcome: Progressing  Goal: Maintain or return to baseline ADL function  Description: INTERVENTIONS:  -  Assess patient's ability to carry out ADLs; assess patient's baseline for ADL function and identify physical deficits which impact ability to perform ADLs (bathing, care of mouth/teeth, toileting, grooming, dressing, etc.)  - Assess/evaluate cause of self-care deficits   - Assess range of motion  - Assess patient's mobility; develop plan if impaired  - Assess patient's need for assistive devices and provide as appropriate  - Encourage maximum independence but intervene and supervise when necessary  - Involve family in performance of ADLs  - Assess for home care needs following discharge   - Consider OT consult to assist with ADL evaluation and planning for discharge  - Provide patient education as appropriate  Outcome: Progressing  Goal: Maintains/Returns to pre admission functional level  Description: INTERVENTIONS:  - Perform AM-PAC 6 Click Basic Mobility/ Daily Activity assessment daily.  - Set and communicate daily mobility goal to care team and patient/family/caregiver.   - Collaborate with rehabilitation services on mobility goals if consulted  -  Perform Range of Motion 3 times a day.  - Reposition patient every 2 hours.  - Dangle patient 3 times a day  - Stand patient 3 times a day  - Ambulate patient 3 times a day  - Out of bed to chair 3 times a day   - Out of bed for meals 3 times a day  - Out of bed for toileting  - Record patient progress and toleration of activity level   Outcome: Progressing     Problem: DISCHARGE PLANNING  Goal: Discharge to home or other facility with appropriate resources  Description: INTERVENTIONS:  - Identify barriers to discharge w/patient and caregiver  - Arrange for needed discharge resources and transportation as appropriate  - Identify discharge learning needs (meds, wound care, etc.)  - Arrange for interpretive services to assist at discharge as needed  - Refer to Case Management Department for coordinating discharge planning if the patient needs post-hospital services based on physician/advanced practitioner order or complex needs related to functional status, cognitive ability, or social support system  Outcome: Progressing     Problem: Knowledge Deficit  Goal: Patient/family/caregiver demonstrates understanding of disease process, treatment plan, medications, and discharge instructions  Description: Complete learning assessment and assess knowledge base.  Interventions:  - Provide teaching at level of understanding  - Provide teaching via preferred learning methods  Outcome: Progressing     Problem: NEUROSENSORY - ADULT  Goal: Achieves stable or improved neurological status  Description: INTERVENTIONS  - Monitor and report changes in neurological status  - Monitor vital signs such as temperature, blood pressure, glucose, and any other labs ordered   - Initiate measures to prevent increased intracranial pressure  - Monitor for seizure activity and implement precautions if appropriate      Outcome: Progressing     Problem: CARDIOVASCULAR - ADULT  Goal: Maintains optimal cardiac output and hemodynamic  stability  Description: INTERVENTIONS:  - Monitor I/O, vital signs and rhythm  - Monitor for S/S and trends of decreased cardiac output  - Administer and titrate ordered vasoactive medications to optimize hemodynamic stability  - Assess quality of pulses, skin color and temperature  - Assess for signs of decreased coronary artery perfusion  - Instruct patient to report change in severity of symptoms  Outcome: Progressing  Goal: Absence of cardiac dysrhythmias or at baseline rhythm  Description: INTERVENTIONS:  - Continuous cardiac monitoring, vital signs, obtain 12 lead EKG if ordered  - Administer antiarrhythmic and heart rate control medications as ordered  - Monitor electrolytes and administer replacement therapy as ordered  Outcome: Progressing     Problem: METABOLIC, FLUID AND ELECTROLYTES - ADULT  Goal: Electrolytes maintained within normal limits  Description: INTERVENTIONS:  - Monitor labs and assess patient for signs and symptoms of electrolyte imbalances  - Administer electrolyte replacement as ordered  - Monitor response to electrolyte replacements, including repeat lab results as appropriate  - Instruct patient on fluid and nutrition as appropriate  Outcome: Progressing  Goal: Fluid balance maintained  Description: INTERVENTIONS:  - Monitor labs   - Monitor I/O and WT  - Instruct patient on fluid and nutrition as appropriate  - Assess for signs & symptoms of volume excess or deficit  Outcome: Progressing  Goal: Glucose maintained within target range  Description: INTERVENTIONS:  - Monitor Blood Glucose as ordered  - Assess for signs and symptoms of hyperglycemia and hypoglycemia  - Administer ordered medications to maintain glucose within target range  - Assess nutritional intake and initiate nutrition service referral as needed  Outcome: Progressing

## 2024-12-03 NOTE — CONSULTS
Consultation - Chen Peace 44 y.o. female MRN: [id number]    Unit/Bed#: -01 Encounter: [id number]      Assessment & Plan     Assessment:  This is a 44 y.o.-year-old female with hyperthyroidism.    1.  Symptomatic thyrotoxicosis, bordering on thyroid storm.  She likely has had hyperthyroidism for several months probably on the basis of Graves' disease given family history and story/presentation.  Started on high dose PTU for thyroid storm, propranolol, and hydrocortisone stress dose.  Hemodynamically improving with blood pressure and heart rate improvement.    Hopefully, as long as she is stable hemodynamically, we will be able to discontinue hydrocortisone tomorrow and switch PTU/propylthiouracil to a more stable outpatient dosage of medication by utilizing methimazole 20 mg twice a day.  Would also discharge on propranolol but dosage hopefully can be decreased.  If she is switched to methimazole tomorrow, would prefer to observe her for another 24 hours on this dosage adjustment before discharge.    Plan:  1.  Continue  mg every 4 hours.  If stable tomorrow morning can switch to methimazole 20 mg twice a day.    2.  Continue hydrocortisone 100 mg IV every 8 hours and should be able to discontinue it tomorrow.    3.  Continue propranolol, will need to switch to outpatient dosage when discharged.    4.  Will need follow-up with endocrine within 1 to 2 weeks.    Inpatient consult to Endocrinology  Consult performed by: Josey Judd MD  Consult ordered by: Anne Joseph PA-C          CC: Hyperthyroid consult    History of Present Illness     HPI: Chen Peace is a 44 y.o. year old female with hyperthyroidism admitted with near thyroid storm, unstable blood pressure/hypertensive crisis and tachycardia.  Endocrine is asked to consult regarding thyroid management.    She reports that for the last 3 to 4 months, she has not been feeling well with primarily GI symptoms such as GE reflux disease and  diarrhea.  She did have some elevated heart rate and tremors.  She was feeling hot and sweaty particularly at 3 AM.  She has had a 20 pound weight loss in 5 weeks.  She has had insomnia and fatigue.  She saw her primary care physician last week who did blood work demonstrating hypothyroidism iron deficiency and vitamin D deficiency and then underwent EGD and colonoscopy last week.  There was nothing significant on pathology of this test.  After the colonoscopy, blood pressure and pulse were a bit unstable and she almost lost consciousness while walking after the procedure.  This resolved on its own other than some slight nausea and vomiting the next day.  She then felt relatively reasonable for several days and started to feel poorly the day before admission but on the day of admission she was getting ready to go to work and was walking back into the house feeling warm and lightheaded and does not remember anything except waking up on the couch so likely lost consciousness.  She then went to her PCP and lost consciousness again on the scale with an abnormal EKG and markedly elevated blood pressure for which she was sent to the emergency room and admitted.    She was presumed to be in at least near thyroid storm with a Price Wartofsky score between 40 and 45.  She was started on PTU with a loading dose of 500 mg followed by 200 mg every 4 hours, propranolol 60 mg every 6 hours, and hydrocortisone 100 mg IV every 8 hours.    Blood pressure is improved from a high of 228/128 to most recent 137/71 and heart rate is down from 120s to anywhere between 80 and 100.  She never had a fever or rapid respirations and pulse ox has been normal.  There was some hand and feet edema so there was suspicion of possible heart failure.    Of note, she does have a mother who I believe had Graves' disease and is post subtotal thyroidectomy as her thyroid disease was originally diagnosed due to bulging eyes.  A maternal grandmother and a  maternal great aunt both had thyroid disease suspected to be hyperthyroidism as did maternal great grandmother and maternal great great aunt also have thyroid disease.    Review of Systems   Constitutional:  Positive for fatigue and unexpected weight change.        20 pound weight loss in 5 weeks.   HENT:  Negative for trouble swallowing.    Eyes:  Negative for visual disturbance.        No diplopia.   Respiratory:  Negative for choking and shortness of breath.    Cardiovascular:  Positive for palpitations. Negative for chest pain.   Gastrointestinal:  Positive for diarrhea and nausea. Negative for abdominal pain and constipation.   Endocrine: Positive for heat intolerance. Negative for cold intolerance.   Genitourinary:         Menstrual cycles do typically occur on a regular monthly basis and are heavy but she had a very light menses in early November lasting 3 days and then had a heavy menses 18 days later which is sooner than typical.   Skin:  Negative for wound.        No hair loss.  No brittle nails.  No dry skin.  No rashes.   Neurological:  Positive for tremors and headaches.   Psychiatric/Behavioral:  Positive for sleep disturbance. Negative for dysphoric mood. The patient is nervous/anxious.         She has noticed being more anxious, more irritable and impatient at work.  She has been more lees.       Historical Information   Past Medical History:   Diagnosis Date    Anemia     Anxiety and depression     Hyperlipidemia     Hypertension     Menorrhagia with regular cycle     Obesity     Reactive airway disease     Vitamin D deficiency      Past Surgical History:   Procedure Laterality Date     SECTION      X2    MAMMO (HISTORICAL)  2020    TUBAL LIGATION      pt had embedded IUD removed and tubal ligation performed     Social History   Social History     Substance and Sexual Activity   Alcohol Use Yes    Comment: occasional     Social History     Substance and Sexual Activity   Drug Use  Never     Social History     Tobacco Use   Smoking Status Never   Smokeless Tobacco Never     Family History:   Family History   Problem Relation Age of Onset    Early menopause Mother     Endometriosis Mother     Hypertension Mother     Hyperlipidemia Mother     Skin cancer Father     Stroke Father     Coronary artery disease Father     Hypertension Father     Hyperlipidemia Father     Colon cancer Maternal Grandmother     Hypertension Maternal Grandmother     Hypertension Maternal Grandfather     Hypertension Paternal Grandmother     Hypertension Paternal Grandfather     Hypertension Maternal Aunt     Hypertension Maternal Uncle     Hypertension Paternal Aunt     Hypertension Paternal Uncle     Breast cancer Neg Hx     Ovarian cancer Neg Hx     Uterine cancer Neg Hx        Meds/Allergies   Current Facility-Administered Medications   Medication Dose Route Frequency Provider Last Rate Last Admin    acetaminophen (TYLENOL) tablet 650 mg  650 mg Oral Q6H PRN Anne Joseph PA-C        albuterol (PROVENTIL HFA,VENTOLIN HFA) inhaler 2 puff  2 puff Inhalation Q6H PRN Anne Joseph PA-C        famotidine (PEPCID) tablet 40 mg  40 mg Oral HS Anne Joseph PA-C   40 mg at 12/02/24 2336    ferrous sulfate tablet 325 mg  325 mg Oral TID With Meals Anne Joseph PA-C   325 mg at 12/03/24 1726    heparin (porcine) subcutaneous injection 5,000 Units  5,000 Units Subcutaneous Q8H [facility] Anne Joseph PA-C   5,000 Units at 12/03/24 1410    hydrocortisone (Solu-CORTEF) injection 100 mg  100 mg Intravenous Q8H Anne Joseph PA-C   100 mg at 12/03/24 1725    ondansetron (ZOFRAN) injection 4 mg  4 mg Intravenous Q6H PRN Anne Joseph PA-C        pantoprazole (PROTONIX) EC tablet 40 mg  40 mg Oral Early Morning Anne Joseph PA-C   40 mg at 12/03/24 0516    pravastatin (PRAVACHOL) tablet 20 mg  20 mg Oral Daily Anne Joseph PA-C   20 mg at 12/02/24 2336    propranolol (INDERAL) tablet 60 mg  60 mg Oral Q6H [facility] Anne Joseph PA-C   60 mg at 12/03/24 1726     "propylthiouracil tablet 200 mg  200 mg Oral Q4H Anne Joseph PA-C   200 mg at 12/03/24 1725    sodium chloride (PF) 0.9 % injection 3 mL  3 mL Intravenous Q1H PRN Anne Joseph PA-C         Allergies   Allergen Reactions    Sulfa Antibiotics Anaphylaxis    Sulfamethoxazole-Trimethoprim Anaphylaxis    Mupirocin Hives     Other Reaction(s): swelling lips, flushed cheeks       Objective   Vitals: Blood pressure 137/71, pulse 89, temperature 98.2 °F (36.8 °C), temperature source Oral, resp. rate 18, height 4' 11\" (1.499 m), weight 99.8 kg (220 lb 0.3 oz), SpO2 96%, not currently breastfeeding.    Intake/Output Summary (Last 24 hours) at 12/3/2024 1901  Last data filed at 12/3/2024 1438  Gross per 24 hour   Intake 2520 ml   Output 1925 ml   Net 595 ml     Invasive Devices       Peripheral Intravenous Line  Duration             Peripheral IV 12/02/24 Right Antecubital 1 day                    Physical Exam  Constitutional:       Appearance: Normal appearance. She is well-developed.   HENT:      Head: Normocephalic and atraumatic.   Eyes:      Extraocular Movements: Extraocular movements intact.      Conjunctiva/sclera: Conjunctivae normal.      Comments: No lid lag, stare, proptosis, or periorbital edema.  She did experience diplopia during this exam when she was doing extraocular motions to look up.   Neck:      Thyroid: No thyromegaly.      Vascular: No carotid bruit.      Comments: Thyroid not enlarged.  No palpable nodules.  No lymphadenopathy.  No audible bruits over the thyroid gland.  Cardiovascular:      Rate and Rhythm: Normal rate and regular rhythm.      Heart sounds: Normal heart sounds. No murmur heard.  Pulmonary:      Effort: Pulmonary effort is normal.      Breath sounds: Normal breath sounds. No wheezing.   Abdominal:      General: Bowel sounds are normal.      Palpations: Abdomen is soft.   Musculoskeletal:         General: Normal range of motion.      Cervical back: Normal range of motion and neck supple. " "     Right lower leg: No edema.      Left lower leg: No edema.      Comments: No tremor of the outstretched hands.   Lymphadenopathy:      Cervical: No cervical adenopathy.   Skin:     General: Skin is warm and dry.   Neurological:      Mental Status: She is alert and oriented to person, place, and time.      Deep Tendon Reflexes: Reflexes are normal and symmetric.      Comments: Patellar deep tendon reflexes normal.         The history was obtained from the review of the chart, patient.    Lab Results:       Blood work performed on 12/2/2024 showed a TSH of less than 0.01 with a free T4 of 3.95 and a T3 total of 3.1 performed on 12/3/2024.    Thyroid-stimulating immunoglobulins and thyroid receptor antibodies are still pending.    Lab Results   Component Value Date    WBC 4.06 (L) 12/03/2024    HGB 11.6 12/03/2024    HCT 40.5 12/03/2024    MCV 82 12/03/2024     12/03/2024     Lab Results   Component Value Date/Time    BUN 9 12/03/2024 04:37 AM    BUN 8 11/26/2015 02:00 PM     11/26/2015 02:00 PM    K 4.9 12/03/2024 04:37 AM    K 6.3 (HH) 11/26/2015 02:00 PM     12/03/2024 04:37 AM     11/26/2015 02:00 PM    CO2 21 12/03/2024 04:37 AM    CO2 24.7 11/26/2015 02:00 PM    CREATININE 0.48 (L) 12/03/2024 04:37 AM    CREATININE 0.16 (L) 11/26/2015 02:00 PM    AST 26 12/03/2024 04:37 AM    AST 32 11/26/2015 02:00 PM    ALT 19 12/03/2024 04:37 AM    ALT 29 11/26/2015 02:00 PM    TP 6.7 12/03/2024 04:37 AM    ALB 3.8 12/03/2024 04:37 AM    ALB 4.2 11/26/2015 02:00 PM     No results for input(s): \"CHOL\", \"HDL\", \"LDL\", \"TRIG\", \"VLDL\" in the last 72 hours.  No results found for: \"MICROALBUR\", \"[id number]\"  POC Glucose (mg/dl)   Date Value   11/27/2024 96       Imaging Studies: Results Review Statement: No pertinent imaging studies reviewed.    Portions of the record may have been created with voice recognition software.   "

## 2024-12-03 NOTE — QUICK NOTE
Consult received.   In short this is a 44 year old female who presented to the hospital after having a syncopal episode at home. This has been her third syncopal episode since 11/27. Had a colonoscopy on 11/27 due to ongoing abdominal discomfort, diarrhea, nausea and symptoms GERD. 20 lb weight loss. Prior to her colonoscopy her PCP had ordered labwork which was performed on 11/25. Not in epic since performed out of network. Patient able to bring it up and her was TSH < 0.005 and  free T4 4.12. Tonight TSH is < 0.010 (free T4 pending). She is alert and oriented x 3. HR ranging > 110-120. Nauseous + episodes of vomiting at home.  Anxious (ongoing last couple of weeks). She is on Atenolol chronically. Has not taken since Saturday since not feeling well. Mother with history of hyperthyroidism.   Price-Wartofsky score based on chart review and pedal and hand edema reported by primary team - 45.   Case discussed with Dr. Phelan.       Assessment:   44 year old female with hyperthyroidism and concern for thyroid storm. Unclear etiology at this time - will need additional workup.        Plan:  Recommend starting hydrocortisone 300mg IV, followed by 100mg every 8 hours. Give before PTU/Propranolol.   Recommend propylthiouracil 500mg x1, followed by 200mg every 4 hours.   Recommend Propranolol 60mg every 6 hours.   Recommend obtaining T3 , TSI, TRAb   Recommend monitoring patient on telemetry for arrhythmias.  Recommend evaluation with TTE for high output heart failure. If TTE normal, may consider IV fluids.   Official consult to follow tomorrow.

## 2024-12-03 NOTE — H&P
H&P - Hospitalist   Name: Chen Peace 44 y.o. female I MRN: [id number]  Unit/Bed#: -01 I Date of Admission: 12/2/2024   Date of Service: 12/3/2024 I Hospital Day: 0     Assessment & Plan  Syncope  Presents due to multiple syncopal episodes since 11/27. Had colonoscopy on 11/27   CT head unremarkable.  Small suspicion for PE. However TSH level undetectable. Unable to have contrast study with risk of causing thyroid storm.  TSH < 0.0010   3.95  Trop 18, 21    EKG sinus tachycardia   Mag 1.8  Plan    Obtain VQ   Obtain ECHO   Replete magnesium   Telemetry   Low TSH level  Suspicion for hyperthyroidism. Possible thyroid storm   Over the past couple of months with abdominal discomfort, nausea, acid reflux, diarrhea, palpitations and anxiety.   Lost about 20 lbs in the last 5-6 weeks   3 syncopal episodes since 11/27. Had colonoscopy on 11/27   Price-Wartofsky Point Scale: 40   Gi symptoms (10), -129 (15), CHF mild edema (5), precipitating event (10)  Mother with history of hyperthyroidism. Had thyroid removed per daughter  Out of network labwork performed on 11/25/24  TSH 0.005. Free T4 4.12   Lab work here  TSH < 0.010   Free T4 3.95   TSI pending   TRAb pending   T3 pending   Reached out to endocrinology. Appreciate their recommendations   IV hydrocortisone 300 mg x 1. Followed by 100 mg q 8 hours   Propranolol 60 mg q 6 hours    mg x 1. Followed by 200 mg q 4 hours   Obtain ECHO  Formal endocrinology consult to follow     Tachycardia  -120s   Trop WNL   EKG: sinus tachycardia   Has ongoing palpitations. Denies chest pain.   Propranolol 60 mg q 6 hours   Telemetry   Essential hypertension  Home regimen: atenolol 50 mg daily   Hold while here. Receiving propranolol every 6 hours due to concern for hyperthyroidism.   Morbid obesity (HCC)  Body mass index is 44.43 kg/m².  Consult nutrition       VTE Pharmacologic Prophylaxis: VTE Score: 3 Moderate Risk (Score 3-4) - Pharmacological DVT  Prophylaxis Ordered: heparin.  Code Status: Level 1 - Full Code   Discussion with family: Patient declined call to .     Anticipated Length of Stay: Patient will be admitted on an observation basis with an anticipated length of stay of less than 2 midnights secondary to Syncope, TSH.    History of Present Illness   Chief Complaint: Passed out     Chen Peace is a 44 y.o. female with a PMH of HTN, obesity who presents with syncopal episode at home.  Third syncopal episode since 11/27.  On 11/27 patient had a colonoscopy performed due to ongoing epigastric discomfort associated with reflux, nausea, vomiting, diarrhea that has been worsening over the last couple of months.  Prior to her colonoscopy her primary had ordered outpatient labs that were performed on 11/25.  Performed out of network but patient able to bring up on her phone.  TSH <0.005 and free T4 4.12.  Patient was instructed by her primary to repeat these labs in 3 to 4 weeks.  Patient then had her colonoscopy on 11/27.  Colonoscopy performed without any issues however shortly after awakening in the hospital had a syncopal event.  Patient was in the hospital when the event occurred but had been discharged.  She was recommended to go to the ER but patient had went home instead.  Over the past couple of days patient has had periods of extreme fatigue, nausea, vomiting, palpitations and increasing anxiety.  Has lost approximately 20 pounds over the last 5 to 6 weeks. 2 syncopal events at home.     She is typically compliant with her home medications.  However due to not feeling well has not taken any of them since Saturday.     Review of Systems   Constitutional:  Positive for fatigue. Negative for fever.   HENT:  Negative for sore throat.    Respiratory:  Negative for cough, chest tightness and shortness of breath.    Cardiovascular:  Positive for palpitations. Negative for chest pain.   Gastrointestinal:  Positive for diarrhea, nausea and  vomiting. Negative for abdominal distention and abdominal pain.   Genitourinary:  Negative for difficulty urinating.   Musculoskeletal:  Negative for arthralgias.   Neurological:  Negative for weakness and headaches.   Psychiatric/Behavioral:  Negative for agitation and behavioral problems. The patient is nervous/anxious.    All other systems reviewed and are negative.      Historical Information   Past Medical History:   Diagnosis Date    Anemia     Anxiety and depression     Hyperlipidemia     Hypertension     Menorrhagia with regular cycle     Obesity     Reactive airway disease     Vitamin D deficiency      Past Surgical History:   Procedure Laterality Date     SECTION      X2    MAMMO (HISTORICAL)  2020    TUBAL LIGATION      pt had embedded IUD removed and tubal ligation performed     Social History     Tobacco Use    Smoking status: Never    Smokeless tobacco: Never   Vaping Use    Vaping status: Never Used   Substance and Sexual Activity    Alcohol use: Yes     Comment: occasional    Drug use: Never    Sexual activity: Yes     Partners: Male     Birth control/protection: Female Sterilization     E-Cigarette/Vaping    E-Cigarette Use Never User      E-Cigarette/Vaping Substances    Nicotine No     THC No     CBD No     Flavoring No     Other No     Unknown No      Family History   Problem Relation Age of Onset    Early menopause Mother     Endometriosis Mother     Hypertension Mother     Hyperlipidemia Mother     Skin cancer Father     Stroke Father     Coronary artery disease Father     Hypertension Father     Hyperlipidemia Father     Colon cancer Maternal Grandmother     Hypertension Maternal Grandmother     Hypertension Maternal Grandfather     Hypertension Paternal Grandmother     Hypertension Paternal Grandfather     Hypertension Maternal Aunt     Hypertension Maternal Uncle     Hypertension Paternal Aunt     Hypertension Paternal Uncle     Breast cancer Neg Hx     Ovarian cancer Neg Hx      Uterine cancer Neg Hx      Social History:  Marital Status: /Civil Union   Occupation:   Patient Pre-hospital Living Situation: Home  Patient Pre-hospital Level of Mobility: walks  Patient Pre-hospital Diet Restrictions: regular    Meds/Allergies   I have reviewed home medications with patient personally.  Prior to Admission medications    Medication Sig Start Date End Date Taking? Authorizing Provider   albuterol (VENTOLIN HFA) 90 mcg/act inhaler Inhale 2 puffs every 6 (six) hours as needed for wheezing 11/27/18  Yes Souleymane Hess MD   atenolol (TENORMIN) 50 mg tablet Take 1 tablet by mouth in the morning  Patient taking differently: Take 1 tablet by mouth in the morning Patient takes in evening 10/29/24  Yes Historical Provider, MD   co-enzyme Q-10 50 MG capsule Take 100 mg by mouth daily     Yes Historical Provider, MD   ergocalciferol (VITAMIN D2) 50,000 units Take 50,000 Units by mouth once a week Last taken 12/1 11/26/24  Yes Historical Provider, MD   ferrous sulfate 325 (65 FE) MG EC tablet Take 325 mg by mouth 3 (three) times a day with meals   Yes Historical Provider, MD   Pepcid 40 MG tablet 40 mg daily at bedtime   Yes Historical Provider, MD   pravastatin (PRAVACHOL) 20 mg tablet Take 1 tablet by mouth in the morning Takes in evening 10/29/24  Yes Historical Provider, MD   RABEprazole (ACIPHEX) 20 MG tablet Take 1 tablet (20 mg total) by mouth daily 11/20/24 2/18/25 Yes Min Gomez MD   saccharomyces boulardii (FLORASTOR) 250 mg capsule Take 250 mg by mouth in the morning   Yes Historical Provider, MD     Allergies   Allergen Reactions    Sulfa Antibiotics Anaphylaxis    Sulfamethoxazole-Trimethoprim Anaphylaxis    Mupirocin Hives     Other Reaction(s): swelling lips, flushed cheeks       Objective :  Temp:  [98.6 °F (37 °C)-99.1 °F (37.3 °C)] 99.1 °F (37.3 °C)  HR:  [] 92  BP: (141-228)/() 150/95  Resp:  [15-22] 15  SpO2:  [93 %-98 %] 95 %  O2 Device: None  (Room air)    Physical Exam  Vitals and nursing note reviewed.   Constitutional:       Appearance: Normal appearance. She is obese.   HENT:      Head: Normocephalic.   Eyes:      Extraocular Movements: Extraocular movements intact.      Pupils: Pupils are equal, round, and reactive to light.   Cardiovascular:      Rate and Rhythm: Regular rhythm. Tachycardia present.      Heart sounds: No murmur heard.  Pulmonary:      Effort: Pulmonary effort is normal. No respiratory distress.      Breath sounds: Normal breath sounds. No wheezing.   Abdominal:      General: Bowel sounds are normal. There is no distension.      Tenderness: There is no abdominal tenderness. There is no guarding.   Musculoskeletal:         General: Normal range of motion.      Cervical back: Normal range of motion.      Right lower leg: Edema (Foot +1) present.      Left lower leg: Edema (foot +1) present.      Comments: Swollen fingers   Skin:     General: Skin is warm.   Neurological:      General: No focal deficit present.      Mental Status: She is alert and oriented to person, place, and time.   Psychiatric:         Mood and Affect: Mood is anxious.         Behavior: Behavior normal.         Thought Content: Thought content normal.          Lines/Drains:            Lab Results: I have reviewed the following results:  Results from last 7 days   Lab Units 12/02/24  1602   WBC Thousand/uL 3.76*   HEMOGLOBIN g/dL 12.4   HEMATOCRIT % 39.2   PLATELETS Thousands/uL 371   SEGS PCT % 47   LYMPHO PCT % 36   MONO PCT % 15*   EOS PCT % 1     Results from last 7 days   Lab Units 12/02/24  1602   SODIUM mmol/L 140   POTASSIUM mmol/L 4.9   CHLORIDE mmol/L 105   CO2 mmol/L 26   BUN mg/dL 8   CREATININE mg/dL 0.44*   ANION GAP mmol/L 9   CALCIUM mg/dL 10.3*   ALBUMIN g/dL 4.2   TOTAL BILIRUBIN mg/dL 0.37   ALK PHOS U/L 58   ALT U/L 23   AST U/L 29   GLUCOSE RANDOM mg/dL 95         Results from last 7 days   Lab Units 11/27/24  1451   POC GLUCOSE mg/dl 96     No  "results found for: \"HGBA1C\"        Imaging Results Review: I reviewed radiology reports from this admission including: CT chest, CT abdomen/pelvis, and CT head.  Other Study Results Review: EKG was reviewed.       ** Please Note: This note has been constructed using a voice recognition system. **    "

## 2024-12-03 NOTE — ASSESSMENT & PLAN NOTE
Home regimen: atenolol 50 mg daily   Hold while here. Receiving propranolol every 6 hours due to concern for hyperthyroidism.

## 2024-12-03 NOTE — ASSESSMENT & PLAN NOTE
Suspicion for hyperthyroidism. Possible thyroid storm   Over the past couple of months with abdominal discomfort, nausea, acid reflux, diarrhea, palpitations and anxiety.   Lost about 20 lbs in the last 5-6 weeks   3 syncopal episodes since 11/27. Had colonoscopy on 11/27   Price-Wartofsky Point Scale: 40   Gi symptoms (10), -129 (15), CHF mild edema (5), precipitating event (10)  Mother with history of hyperthyroidism. Had thyroid removed per daughter  Out of network labwork performed on 11/25/24  TSH 0.005. Free T4 4.12   Lab work here  TSH < 0.010   Free T4 3.95   TSI pending   TRAb pending   T3 pending   Reached out to endocrinology. Appreciate their recommendations   IV hydrocortisone 300 mg x 1. Followed by 100 mg q 8 hours   Propranolol 60 mg q 6 hours    mg x 1. Followed by 200 mg q 4 hours   Obtain ECHO  Formal endocrinology consult to follow

## 2024-12-03 NOTE — ASSESSMENT & PLAN NOTE
"Patient reports not \"feeling herself: for over a month with vague symptoms along with fatigue, weight loss, nausea, abdominal discomfort and reflux.  Seen by her PCP and EGD/Colonoscopy was ordered along with lab work. EGD/Colonoscopy performed on 11/27 and patient reports having a syncopal episode upon discharge.  She was brought to the ED but was not seen at that time.  Patient reports on 12/2 while getting ready to go to work felt pre-syncopal and \"came to\" lying on her couch.  This prompted a phone call and visit to her primary care where she had another syncopal episode.  She was then transported to the ER for further evaluation.      Hospital workup   TTE 12/03/24:  EF 65-70%,  EKG: Sinus Tachycardia  Telemetry: SR currently , no evidence of pauses, ectopy   V/Q scan: normal   CT abd/pelvis: no acute process, prominent coronary artery calcification    CT head: no acute process   "

## 2024-12-03 NOTE — UTILIZATION REVIEW
"OBSERVATION 12-2-24 CHANGED TO INPATIENT 12-4-24 FOR MEDICATION ADJUSTMENT.      Initial Clinical Review    Admission: Date/Time/Statement:   Admission Orders (From admission, onward)       Ordered        12/04/24 0753  INPATIENT ADMISSION  Once            12/02/24 2033  Place in Observation  Once                               ED Arrival Information       Expected   -    Arrival   12/2/2024 13:53    Acuity   Emergent              Means of arrival   Wheelchair    Escorted by   Spouse    Service   Hospitalist    Admission type   Emergency              Arrival complaint   abnormal EKG             Chief Complaint   Patient presents with    Syncope     Pt was seen by primary provider and had syncopal episode. Pt reports just today having two syncopal episode. Pt reports \"primary thinks I have hyperthyroidism.\"       Initial Presentation: 44 y.o. female presents to ed from home on 12-2-24 for evaluation and treatment of abnormal outpatient  EKG and syncope with concern for hyperthyroidism.   20 lb wt loss in 5-6 weeks. 3 episodes of syncope since 11-27 when she had a colonoscopy. PMHX: PE, thyrod dysfunction, intestinal perforation, dysrhythmia, PE.   Clinical assessment significant for HTN, tachycardia. Imaging w/o contrast shows no acute finding. CT with contrast contraindicated due to potential for thyroid storm.  EKG: tachycardia, non specific ST/T cannot rule out anterior infarct.  Free T 4 2.95, Mag 1.8, TSH 0.010.   Admit to observation for syncope,  hyperthyroidism, tachycardia, hypertension. Plan includes : iv solucortef, Vq scan, Echo, consult endocrine and cardiology, monitor and replace electrolytes. Continue inderal and start PTU.  Obtain Echo and VQ scan.    Anticipated Length of Stay/Certification Statement: Patient will be admitted on an observation basis with an anticipated length of stay of less than 2 midnights secondary to Syncope, TSH.       Date: 12-3-24    Day 2: observation.  Continue iv " solu-cortef q8 hr and give iv venofer x 1.   Echo wall thickness increased with systolic function vigorous  and VQ scan normal.  Consult endocrinology:  Symptomatic thyrotoxicosis, bordering on thyroid storm. Hemodynamically improving with blood pressure and heart rate improvement. If remains stable will discontinue hydrocortisone tomorrow and switch PTU/ propylthiouracil to possibly methiamazole 20 mg daily.  Also dc on propranolol with dosage slightly decreased. Continue  mg every 4 hours. Continue hydrocortisone 100 mg IV every 8 hours.  Monitor on telemetry.       Date:  12-4-24 observation changed to inpatient  Afebrile. VSS. Intermittent dizziness.   Telemetry nsr. Switch to methiamazole 20 mg q12 hr  today with plan to observe and adjust dosage.      Date: 12-5-24 inpatient med surg  Cardiology clinical assessment today : heart rate on telemetry over last 24 hrs 60s to 100s. VQ normal.  Plan live Zio patch x 14 days.  Dizziness resolved.  Discussed with endocrinology and they recommended patient can be discharged on long-acting propranolol and methimazole. Endocrinology did make an appointment on December 13. Discussed with patient and she is in agreement with the discharge plan and outpatient follow-up     Discharged to home  summary  Admitted due to syncopal episodes.  Patient was found to have hyperthyroidism likely thyrotoxicosis.  Patient was seen by cardiology and endocrinology.  Patient was started on PTU, Solu-Cortef and propranolol.  Patient blood pressure and heart rates are stable.  Patient is cleared by endocrinology and cardiology with outpatient follow-up  Patient will be discharged on methimazole 20 mg twice daily and propranolol  mg daily and follow-up with endocrinology in December 13        ED Treatment-Medication Administration from 12/02/2024 1352 to 12/02/2024 2054       None            Scheduled Medications:  famotidine, 40 mg, Oral, HS  ferrous sulfate, 325 mg, Oral, TID  With Meals  heparin (porcine), 5,000 Units, Subcutaneous, Q8H NUHA  hydrocortisone sodium succinate, 100 mg, Intravenous, Q8H  iron sucrose, 200 mg, Intravenous, Once  pantoprazole, 40 mg, Oral, Early Morning  pravastatin, 20 mg, Oral, Daily  propranolol, 60 mg, Oral, Q6H NUHA  propylthiouracil, 200 mg, Oral, Q4H      Continuous IV Infusions:     PRN Meds:  acetaminophen, 650 mg, Oral, Q6H PRN  albuterol, 2 puff, Inhalation, Q6H PRN  ondansetron, 4 mg, Intravenous, Q6H PRN  sodium chloride (PF), 3 mL, Intravenous, Q1H PRN      ED Triage Vitals [12/02/24 1403]   Temperature Pulse Respirations Blood Pressure SpO2 Pain Score   98.6 °F (37 °C) (!) 126 18 (!) 228/128 97 % No Pain     Weight (last 2 days)       Date/Time Weight    12/03/24 1309 99.8 (220.02)    12/03/24 0100 99.8 (220)    12/02/24 1403 100 (221)            Vital Signs (last 3 days)       Date/Time Temp Pulse Resp BP MAP (mmHg) SpO2 O2 Device [place] Coma Scale Score Pain    12/03/24 14:38:41 98.2 °F (36.8 °C) 101 18 132/77 95 92 % None (Room air) -- --    12/03/24 1309 -- 93 -- 158/85 -- 94 % -- -- --    12/03/24 11:18:26 -- 101 -- 154/85 108 96 % -- -- --    12/03/24 0800 -- -- -- -- -- 96 % None (Room air) 15 No Pain    12/03/24 07:33:58 98 °F (36.7 °C) 98 18 131/77 95 95 % -- -- --    12/03/24 05:17:43 -- 91 -- 140/91 107 95 % -- -- --    12/03/24 01:18:33 -- 92 -- 150/95 113 95 % -- -- --    12/02/24 23:24:19 -- 107 -- 165/93 117 95 % -- -- --    12/02/24 2101 -- -- -- -- -- -- -- -- No Pain    12/02/24 21:00:11 99.1 °F (37.3 °C) 115 18 189/103 132 96 % None (Room air) -- --    12/02/24 2047 -- -- -- -- -- -- -- 15 No Pain    12/02/24 2030 -- 122 15 156/75 108 94 % -- -- --    12/02/24 2015 -- 117 20 -- -- 94 % -- -- --    12/02/24 2000 -- 115 -- 154/72 103 94 % None (Room air) -- --    12/02/24 1945 -- 113 18 -- -- 94 % -- -- --    12/02/24 1930 -- 112 20 144/65 94 94 % -- -- --    12/02/24 1915 -- 109 20 -- -- 93 % -- -- --    12/02/24 1900 -- 107 15  141/69 99 95 % -- -- --    12/02/24 1845 -- 119 -- -- -- 95 % -- -- --    12/02/24 1830 -- 112 22 159/81 107 96 % -- -- --    12/02/24 1800 -- 116 16 179/79 114 95 % -- -- --    12/02/24 1730 -- 118 20 176/80 111 96 % -- -- --    12/02/24 1700 -- 113 17 155/75 108 97 % -- -- --    12/02/24 1602 -- -- 20 -- -- -- None (Room air) 15 No Pain    12/02/24 1530 -- 119 22 186/83 119 95 % -- -- --    12/02/24 1500 -- 117 15 166/95 115 97 % -- -- --    12/02/24 1450 -- 116 -- -- -- -- -- -- --    12/02/24 1445 -- 122 15 186/99 134 98 % -- -- --    12/02/24 1403 98.6 °F (37 °C) 126 18 228/128 -- 97 % None (Room air) -- No Pain              Pertinent Labs/Diagnostic Test Results:   Radiology:  NM lung ventilation / perfusion   Final (12/03 1107)   Normal exam.      CT head without contrast   Final  (12/02 1919)      Within the confines of a motion degraded study, no acute intracranial hemorrhage, mass effect or midline shift.         CT spine cervical without contrast   Final  (12/02 1926)      No acute cervical spine fracture or traumatic malalignment.            CT chest abdomen pelvis wo contrast   Final  (12/02 1953)      Prominent coronary artery calcification for age. Coronary stenosis is possible.      Visceral abdominal obesity.      Relative enlargement of the uterus that may indicate uterine adenomyosis or fibroids.      Cholelithiasis without evidence of acute cholecystitis.      No acute traumatic injury of the chest, abdomen or pelvis.         X-ray chest 1 view portable   Final  (12/02 1523)      No acute cardiopulmonary disease.           Cardiology:  ECG 12 lead   Final  (12/02 2225)   Sinus tachycardia   Cannot rule out Anterior infarct (cited on or before 02-Dec-2024)   Abnormal ECG   When compared with ECG of 02-Dec-2024 17:11   No significant change was found      ECG 12 lead   Final (12/02 2225)   Sinus tachycardia   Cannot rule out Anterior infarct (cited on or before 02-Dec-2024)   Abnormal ECG   When  compared with ECG of 02-Dec-2024 14:49   No significant change was found      ECG 12 lead   Final    (12/02 2225)   Sinus tachycardia   Cannot rule out Anterior infarct , age undetermined   Abnormal ECG   No previous ECGs available          12-3-24 ECHO    Left Ventricle: Left ventricular cavity size is normal. Wall thickness is mildly increased. There is concentric remodeling. The left ventricular ejection fraction is 65-70%. Systolic function is vigorous. Wall motion is normal.    Right Ventricle: Right ventricular cavity size is normal. Systolic function is normal.    Left Atrium: The atrium is normal in size.    Right Atrium: The atrium is normal in size.      GI:  No orders to display           Results from last 7 days   Lab Units 12/03/24  0437 12/02/24  1602   WBC Thousand/uL 4.06* 3.76*   HEMOGLOBIN g/dL 11.6 12.4   HEMATOCRIT % 40.5 39.2   PLATELETS Thousands/uL 231 371   TOTAL NEUT ABS Thousands/µL 3.15 1.77*         Results from last 7 days   Lab Units 12/03/24  0437 12/02/24  1602   SODIUM mmol/L 137 140   POTASSIUM mmol/L 4.9 4.9   CHLORIDE mmol/L 107 105   CO2 mmol/L 21 26   ANION GAP mmol/L 9 9   BUN mg/dL 9 8   CREATININE mg/dL 0.48* 0.44*   EGFR ml/min/1.73sq m 118 121   CALCIUM mg/dL 9.4 10.3*   MAGNESIUM mg/dL 2.2 1.8*     Results from last 7 days   Lab Units 12/03/24  0437 12/02/24  1602   AST U/L 26 29   ALT U/L 19 23   ALK PHOS U/L 51 58   TOTAL PROTEIN g/dL 6.7 7.5   ALBUMIN g/dL 3.8 4.2   TOTAL BILIRUBIN mg/dL 0.39 0.37     Results from last 7 days   Lab Units 11/27/24  1451   POC GLUCOSE mg/dl 96     Results from last 7 days   Lab Units 12/03/24  0437 12/02/24  1602   GLUCOSE RANDOM mg/dL 138 95     Results from last 7 days   Lab Units 12/02/24  2042 12/02/24  1817 12/02/24  1602   HS TNI 0HR ng/L  --   --  18   HS TNI 2HR ng/L  --  21  --    HSTNI D2 ng/L  --  3  --    HS TNI 4HR ng/L 21  --   --    HSTNI D4 ng/L 3  --   --              Results from last 7 days   Lab Units 12/02/24  4910    TSH 3RD GENERATON uIU/mL <0.010*     Results from last 7 days   Lab Units 12/02/24  1602   BNP pg/mL 153*     Results from last 7 days   Lab Units 12/03/24  0106   FERRITIN ng/mL 14   IRON SATURATION % 14*   IRON ug/dL 46*   TIBC ug/dL 339     Results from last 7 days   Lab Units 12/02/24  1602   LIPASE u/L 25     Results from last 7 days   Lab Units 12/02/24  1601   CLARITY UA  Slightly Cloudy   COLOR UA  Yellow   SPEC GRAV UA  <1.005*   PH UA  6.0   GLUCOSE UA mg/dl Negative   KETONES UA mg/dl Negative   BLOOD UA  Large*   PROTEIN UA mg/dl 30 (1+)*   NITRITE UA  Negative   BILIRUBIN UA  Negative   UROBILINOGEN UA (BE) mg/dl <2.0   LEUKOCYTES UA  Negative   WBC UA /hpf 0-1   RBC UA /hpf 1-2   BACTERIA UA /hpf Moderate*   EPITHELIAL CELLS WET PREP /hpf Occasional   MUCUS THREADS  Occasional*       Past Medical History:   Diagnosis Date    Anemia     Anxiety and depression     Hyperlipidemia     Hypertension     Menorrhagia with regular cycle     Obesity     Reactive airway disease     Vitamin D deficiency      Present on Admission:  **None**      Admitting Diagnosis: Shortness of breath [R06.02]  Syncope [R55]  Chest pain [R07.9]  Abdominal pain [R10.9]  Low TSH level [R79.89]  Age/Sex: 44 y.o. female    Network Utilization Review Department  ATTENTION: Please call with any questions or concerns to [phone] and carefully listen to the prompts so that you are directed to the right person. All voicemails are confidential.   For Discharge needs, contact Care Management DC Support Team at [phone] opt. [phone]  Send all requests for admission clinical reviews, approved or denied determinations and any other requests to dedicated fax number below belonging to the campus where the patient is receiving treatment. List of dedicated fax numbers for the Facilities:  FACILITY NAME UR FAX NUMBER   ADMISSION DENIALS (Administrative/Medical Necessity) [phone]   DISCHARGE SUPPORT TEAM (NETWORK) [phone]   PARENT CHILD  [facility] (Maternity/NICU/Pediatrics) [phone]   [facility] [phone]   [facility] [phone]   [facility] [phone]   [facility] [phone]   [facility] [phone]   [facility] [phone]   [facility] [phone]   [facility] [phone]   [facility] [phone]   [facility] [phone]   [facility] [phone]   [facility] [phone]

## 2024-12-04 LAB
ANION GAP SERPL CALCULATED.3IONS-SCNC: 5 MMOL/L (ref 4–13)
BASOPHILS # BLD AUTO: 0.01 THOUSANDS/ÂΜL (ref 0–0.1)
BASOPHILS NFR BLD AUTO: 0 % (ref 0–1)
BUN SERPL-MCNC: 13 MG/DL (ref 5–25)
CALCIUM SERPL-MCNC: 9.1 MG/DL (ref 8.4–10.2)
CHLORIDE SERPL-SCNC: 108 MMOL/L (ref 96–108)
CO2 SERPL-SCNC: 24 MMOL/L (ref 21–32)
CREAT SERPL-MCNC: 0.45 MG/DL (ref 0.6–1.3)
EOSINOPHIL # BLD AUTO: 0.01 THOUSAND/ÂΜL (ref 0–0.61)
EOSINOPHIL NFR BLD AUTO: 0 % (ref 0–6)
ERYTHROCYTE [DISTWIDTH] IN BLOOD BY AUTOMATED COUNT: 13.8 % (ref 11.6–15.1)
GFR SERPL CREATININE-BSD FRML MDRD: 120 ML/MIN/1.73SQ M
GLUCOSE SERPL-MCNC: 138 MG/DL (ref 65–140)
HCT VFR BLD AUTO: 35.9 % (ref 34.8–46.1)
HGB BLD-MCNC: 11 G/DL (ref 11.5–15.4)
IMM GRANULOCYTES # BLD AUTO: 0.03 THOUSAND/UL (ref 0–0.2)
IMM GRANULOCYTES NFR BLD AUTO: 1 % (ref 0–2)
LYMPHOCYTES # BLD AUTO: 1.02 THOUSANDS/ÂΜL (ref 0.6–4.47)
LYMPHOCYTES NFR BLD AUTO: 19 % (ref 14–44)
MAGNESIUM SERPL-MCNC: 2 MG/DL (ref 1.9–2.7)
MCH RBC QN AUTO: 23.9 PG (ref 26.8–34.3)
MCHC RBC AUTO-ENTMCNC: 30.6 G/DL (ref 31.4–37.4)
MCV RBC AUTO: 78 FL (ref 82–98)
MONOCYTES # BLD AUTO: 0.48 THOUSAND/ÂΜL (ref 0.17–1.22)
MONOCYTES NFR BLD AUTO: 9 % (ref 4–12)
NEUTROPHILS # BLD AUTO: 3.71 THOUSANDS/ÂΜL (ref 1.85–7.62)
NEUTS SEG NFR BLD AUTO: 71 % (ref 43–75)
NRBC BLD AUTO-RTO: 0 /100 WBCS
PLATELET # BLD AUTO: 295 THOUSANDS/UL (ref 149–390)
PMV BLD AUTO: 10.3 FL (ref 8.9–12.7)
POTASSIUM SERPL-SCNC: 4.6 MMOL/L (ref 3.5–5.3)
RBC # BLD AUTO: 4.6 MILLION/UL (ref 3.81–5.12)
SODIUM SERPL-SCNC: 137 MMOL/L (ref 135–147)
WBC # BLD AUTO: 5.26 THOUSAND/UL (ref 4.31–10.16)

## 2024-12-04 PROCEDURE — 80048 BASIC METABOLIC PNL TOTAL CA: CPT | Performed by: INTERNAL MEDICINE

## 2024-12-04 PROCEDURE — 83735 ASSAY OF MAGNESIUM: CPT | Performed by: INTERNAL MEDICINE

## 2024-12-04 PROCEDURE — 85025 COMPLETE CBC W/AUTO DIFF WBC: CPT | Performed by: INTERNAL MEDICINE

## 2024-12-04 PROCEDURE — 99232 SBSQ HOSP IP/OBS MODERATE 35: CPT | Performed by: INTERNAL MEDICINE

## 2024-12-04 RX ORDER — METHIMAZOLE 5 MG/1
20 TABLET ORAL EVERY 12 HOURS SCHEDULED
Status: DISCONTINUED | OUTPATIENT
Start: 2024-12-04 | End: 2024-12-05 | Stop reason: HOSPADM

## 2024-12-04 RX ADMIN — PROPRANOLOL HYDROCHLORIDE 60 MG: 20 TABLET ORAL at 23:41

## 2024-12-04 RX ADMIN — PANTOPRAZOLE SODIUM 40 MG: 40 TABLET, DELAYED RELEASE ORAL at 06:12

## 2024-12-04 RX ADMIN — ACETAMINOPHEN 650 MG: 325 TABLET, FILM COATED ORAL at 15:58

## 2024-12-04 RX ADMIN — HEPARIN SODIUM 5000 UNITS: 5000 INJECTION, SOLUTION INTRAVENOUS; SUBCUTANEOUS at 21:23

## 2024-12-04 RX ADMIN — METHIMAZOLE 20 MG: 5 TABLET ORAL at 10:55

## 2024-12-04 RX ADMIN — PROPRANOLOL HYDROCHLORIDE 60 MG: 20 TABLET ORAL at 18:46

## 2024-12-04 RX ADMIN — PROPRANOLOL HYDROCHLORIDE 60 MG: 20 TABLET ORAL at 06:12

## 2024-12-04 RX ADMIN — PROPRANOLOL HYDROCHLORIDE 60 MG: 20 TABLET ORAL at 01:00

## 2024-12-04 RX ADMIN — HEPARIN SODIUM 5000 UNITS: 5000 INJECTION, SOLUTION INTRAVENOUS; SUBCUTANEOUS at 06:12

## 2024-12-04 RX ADMIN — FERROUS SULFATE TAB 325 MG (65 MG ELEMENTAL FE) 325 MG: 325 (65 FE) TAB at 08:36

## 2024-12-04 RX ADMIN — PROPYLTHIOURACIL 200 MG: 50 TABLET ORAL at 01:00

## 2024-12-04 RX ADMIN — FERROUS SULFATE TAB 325 MG (65 MG ELEMENTAL FE) 325 MG: 325 (65 FE) TAB at 15:58

## 2024-12-04 RX ADMIN — FERROUS SULFATE TAB 325 MG (65 MG ELEMENTAL FE) 325 MG: 325 (65 FE) TAB at 11:00

## 2024-12-04 RX ADMIN — METHIMAZOLE 20 MG: 5 TABLET ORAL at 21:23

## 2024-12-04 RX ADMIN — PROPYLTHIOURACIL 200 MG: 50 TABLET ORAL at 06:14

## 2024-12-04 RX ADMIN — FAMOTIDINE 40 MG: 20 TABLET, FILM COATED ORAL at 21:23

## 2024-12-04 RX ADMIN — HEPARIN SODIUM 5000 UNITS: 5000 INJECTION, SOLUTION INTRAVENOUS; SUBCUTANEOUS at 13:52

## 2024-12-04 RX ADMIN — HYDROCORTISONE SODIUM SUCCINATE 100 MG: 100 INJECTION, POWDER, FOR SOLUTION INTRAMUSCULAR; INTRAVENOUS at 08:36

## 2024-12-04 RX ADMIN — PROPRANOLOL HYDROCHLORIDE 60 MG: 20 TABLET ORAL at 11:00

## 2024-12-04 RX ADMIN — PRAVASTATIN SODIUM 20 MG: 20 TABLET ORAL at 08:36

## 2024-12-04 RX ADMIN — HYDROCORTISONE SODIUM SUCCINATE 100 MG: 100 INJECTION, POWDER, FOR SOLUTION INTRAMUSCULAR; INTRAVENOUS at 01:00

## 2024-12-04 NOTE — PROGRESS NOTES
Progress Note - Hospitalist   Name: Chen Peace 44 y.o. female I MRN: [id number]  Unit/Bed#: -01 I Date of Admission: 12/2/2024   Date of Service: 12/4/2024 I Hospital Day: 0    Assessment & Plan  Syncope  Presents due to multiple syncopal episodes since 11/27. Had colonoscopy on 11/27   CT head unremarkable.  Small suspicion for PE. However TSH level undetectable. Unable to have contrast study with risk of causing thyroid storm.  TSH < 0.0010   3.95  Trop 18, 21    EKG sinus tachycardia   Mag 1.8  Plan    VQ scan is normal  Echocardiogram ejection fraction 65 to 70% with  wall motion is normal  Replete magnesium   Low TSH level  Suspicion for hyperthyroidism. Possible thyroid storm   Over the past couple of months with abdominal discomfort, nausea, acid reflux, diarrhea, palpitations and anxiety.   Lost about 20 lbs in the last 5-6 weeks   3 syncopal episodes since 11/27. Had colonoscopy on 11/27   Price-Wartofsky Point Scale: 40   Gi symptoms (10), -129 (15), CHF mild edema (5), precipitating event (10)  Mother with history of hyperthyroidism. Had thyroid removed per daughter  Out of network labwork performed on 11/25/24  TSH 0.005. Free T4 4.12   Lab work here  TSH < 0.010   Free T4 3.95   TSI pending   TRAb pending   T3 pending   Reached out to endocrinology. Appreciate their recommendations   Endocrinology consult appreciated  Patient was started on hydrocortisone and PTU  We will switch to methimazole and DC hydrocortisone  Continue on propranolol  Neurology follow-up    Tachycardia  -120s   Trop WNL   EKG: sinus tachycardia   Has ongoing palpitations. Denies chest pain.   Propranolol 60 mg q 6 hours   Significantly improved.  Essential hypertension  Home regimen: atenolol 50 mg daily   Hold while here. Receiving propranolol every 6 hours due to concern for hyperthyroidism.   Morbid obesity (HCC)  Body mass index is 44.44 kg/m².  Consult nutrition     Labs & Imaging: Results Review  "Statement: No pertinent imaging studies reviewed.    VTE Prophylaxis: in place.    Code Status:   Level 1 - Full Code    Patient Centered Rounds: I have performed bedside rounds with nursing staff today.    Mobility:   Basic Mobility Inpatient Raw Score: 21  JH-HLM Goal: 6: Walk 10 steps or more  JH-HLM Achieved: 6: Walk 10 steps or more  JH-HLM Goal NOT achieved. Continue with multidisciplinary rounding and encourage appropriate mobility to improve upon JH-HLM goals.    Discussions with Specialists or Other Care Team Provider: Endocrinology    Education and Discussions with Family / Patient: Patient will update family    Total Time Spent on Date of Encounter in care of patient: 35 mins. This time was spent on one or more of the following: performing physical exam; counseling and coordination of care; obtaining or reviewing history; documenting in the medical record; reviewing/ordering tests, medications or procedures; communicating with other healthcare professionals and discussing with patient's family/caregivers.    Current Length of Stay: 0 day(s)    Current Patient Status: Inpatient   Certification Statement: The patient will continue to require additional inpatient hospital stay due to see my assessment and plan.     Subjective:   Patient is seen and examined at bedside.  No new symptoms.  Afebrile.  Feels better  All other ROS are negative.    Objective:    Vitals: Blood pressure 163/92, pulse 95, temperature 98.2 °F (36.8 °C), temperature source Oral, resp. rate 17, height 4' 11\" (1.499 m), weight 99.8 kg (220 lb 0.3 oz), SpO2 97%, not currently breastfeeding.,Body mass index is 44.44 kg/m².  SPO2 RA Rest      Flowsheet Row ED to Hosp-Admission (Current) from 12/2/2024 in [facility]   SpO2 97 %   SpO2 Activity At Rest   O2 Device None (Room air)   O2 Flow Rate --          I&O:   Intake/Output Summary (Last 24 hours) at 12/4/2024 9656  Last data filed at 12/4/2024 0107  Gross " per 24 hour   Intake 1680 ml   Output 925 ml   Net 755 ml       Physical Exam:    General- Alert, lying comfortably in bed. Not in any acute distress.  Neck- Supple, No JVD  CVS- regular, S1 and S2 normal  Chest- Bilateral Air entry, No rhochi, crackles or wheezing present.  Abdomen- soft, nontender, not distended, no guarding or rigidity, BS+  Extremities-  No pedal edema, No calf tenderness                         Normal ROM in all extremities.  CNS-   Alert, awake and orientedx3. No focal deficits present.    Invasive Devices       Peripheral Intravenous Line  Duration             Peripheral IV 12/02/24 Right Antecubital 1 day                          Social History  reviewed  Family History   Problem Relation Age of Onset    Early menopause Mother     Endometriosis Mother     Hypertension Mother     Hyperlipidemia Mother     Skin cancer Father     Stroke Father     Coronary artery disease Father     Hypertension Father     Hyperlipidemia Father     Colon cancer Maternal Grandmother     Hypertension Maternal Grandmother     Hypertension Maternal Grandfather     Hypertension Paternal Grandmother     Hypertension Paternal Grandfather     Hypertension Maternal Aunt     Hypertension Maternal Uncle     Hypertension Paternal Aunt     Hypertension Paternal Uncle     Breast cancer Neg Hx     Ovarian cancer Neg Hx     Uterine cancer Neg Hx     reviewed    Meds:  Current Facility-Administered Medications   Medication Dose Route Frequency Provider Last Rate Last Admin    acetaminophen (TYLENOL) tablet 650 mg  650 mg Oral Q6H PRN Anne Joseph PA-C        albuterol (PROVENTIL HFA,VENTOLIN HFA) inhaler 2 puff  2 puff Inhalation Q6H PRN Anne Joseph PA-C        famotidine (PEPCID) tablet 40 mg  40 mg Oral HS Anne Joseph PA-C   40 mg at 12/03/24 2130    ferrous sulfate tablet 325 mg  325 mg Oral TID With Meals Anne Joseph PA-C   325 mg at 12/03/24 1726    heparin (porcine) subcutaneous injection 5,000 Units  5,000 Units  "Subcutaneous Q8H [facility] Anne Joseph PA-C   5,000 Units at 12/04/24 0612    hydrocortisone (Solu-CORTEF) injection 100 mg  100 mg Intravenous Q8H Anne Joseph PA-C   100 mg at 12/04/24 0100    methimazole (TAPAZOLE) tablet 20 mg  20 mg Oral Q12H [facility] Flaquito Brown MD        ondansetron (ZOFRAN) injection 4 mg  4 mg Intravenous Q6H PRN Anne Joseph PA-C        pantoprazole (PROTONIX) EC tablet 40 mg  40 mg Oral Early Morning Anne Joseph PA-C   40 mg at 12/04/24 0612    pravastatin (PRAVACHOL) tablet 20 mg  20 mg Oral Daily Anne Joseph PA-C   20 mg at 12/02/24 2336    propranolol (INDERAL) tablet 60 mg  60 mg Oral Q6H [facility] Anne Joseph PA-C   60 mg at 12/04/24 0612    sodium chloride (PF) 0.9 % injection 3 mL  3 mL Intravenous Q1H PRN Anne Joseph PA-C            Medications Prior to Admission:     albuterol (VENTOLIN HFA) 90 mcg/act inhaler    atenolol (TENORMIN) 50 mg tablet    co-enzyme Q-10 50 MG capsule    ergocalciferol (VITAMIN D2) 50,000 units    ferrous sulfate 325 (65 FE) MG EC tablet    Pepcid 40 MG tablet    pravastatin (PRAVACHOL) 20 mg tablet    RABEprazole (ACIPHEX) 20 MG tablet    saccharomyces boulardii (FLORASTOR) 250 mg capsule    Labs:  Results from last 7 days   Lab Units 12/04/24  0325 12/03/24  0437 12/02/24  1602   WBC Thousand/uL 5.26 4.06* 3.76*   HEMOGLOBIN g/dL 11.0* 11.6 12.4   HEMATOCRIT % 35.9 40.5 39.2   PLATELETS Thousands/uL 295 231 371   SEGS PCT % 71 78* 47   LYMPHO PCT % 19 17 36   MONO PCT % 9 5 15*   EOS PCT % 0 0 1     Results from last 7 days   Lab Units 12/04/24  0325 12/03/24  0437 12/02/24  1602   POTASSIUM mmol/L 4.6 4.9 4.9   CHLORIDE mmol/L 108 107 105   CO2 mmol/L 24 21 26   BUN mg/dL 13 9 8   CREATININE mg/dL 0.45* 0.48* 0.44*   CALCIUM mg/dL 9.1 9.4 10.3*   ALK PHOS U/L  --  51 58   ALT U/L  --  19 23   AST U/L  --  26 29     No results found for: \"TROPONINI\", \"CKMB\", \"CKTOTAL\"      No results found for: \"BLOODCX\", \"URINECX\", \"WOUNDCULT\", " "\"SPUTUMCULTUR\"      Imaging:  Results for orders placed during the hospital encounter of 12/02/24    X-ray chest 1 view portable    Narrative  XR CHEST PORTABLE    INDICATION: chest pain.    COMPARISON: November 27, 2018 and March 30, 2006    FINDINGS:    Clear lungs. No pneumothorax or pleural effusion.    Normal cardiomediastinal silhouette.    Bones are unremarkable for age.    Normal upper abdomen.    Impression  No acute cardiopulmonary disease.        Workstation performed: [id number]    Results for orders placed in visit on 11/27/18    XR chest pa & lateral    Narrative  CHEST    INDICATION:   R05: Cough.  , Fever and wheezing.    COMPARISON:  March 30, 2006    EXAM PERFORMED/VIEWS:  XR CHEST PA & LATERAL      FINDINGS:    Cardiomediastinal silhouette appears unremarkable.    The lungs are clear.  No pneumothorax or pleural effusion.    Osseous structures appear within normal limits for patient age.    Impression  No acute cardiopulmonary disease.        Workstation performed: [id number]      Last 24 Hours Medication List:   Current Facility-Administered Medications   Medication Dose Route Frequency Provider Last Rate    acetaminophen  650 mg Oral Q6H PRN Anne Joseph PA-C      albuterol  2 puff Inhalation Q6H PRN Anne Joseph PA-C      famotidine  40 mg Oral HS Anne Joseph PA-C      ferrous sulfate  325 mg Oral TID With Meals Anne Joseph PA-C      heparin (porcine)  5,000 Units Subcutaneous Q8H [facility] Anne Joseph PA-C      hydrocortisone sodium succinate  100 mg Intravenous Q8H Anne Joseph PA-C      methimazole  20 mg Oral Q12H [facility] Flaquito Brown MD      ondansetron  4 mg Intravenous Q6H PRN Anne Joseph PA-C      pantoprazole  40 mg Oral Early Morning Anne Joseph PA-C      pravastatin  20 mg Oral Daily Anne Joseph PA-C      propranolol  60 mg Oral Q6H [facility] Anne Joseph PA-C      sodium chloride (PF)  3 mL Intravenous Q1H PRN Anne Joseph PA-C          Today, Patient Was Seen By: Flaquito Brown MD    ** Please " Note: Dictation voice to text software may have been used in the creation of this document. **

## 2024-12-04 NOTE — ASSESSMENT & PLAN NOTE
-120s   Trop WNL   EKG: sinus tachycardia   Has ongoing palpitations. Denies chest pain.   Propranolol 60 mg q 6 hours   Significantly improved.

## 2024-12-04 NOTE — ASSESSMENT & PLAN NOTE
Presents due to multiple syncopal episodes since 11/27. Had colonoscopy on 11/27   CT head unremarkable.  Small suspicion for PE. However TSH level undetectable. Unable to have contrast study with risk of causing thyroid storm.  TSH < 0.0010   3.95  Trop 18, 21    EKG sinus tachycardia   Mag 1.8  Plan    VQ scan is normal  Echocardiogram ejection fraction 65 to 70% with  wall motion is normal  Replete magnesium

## 2024-12-04 NOTE — ASSESSMENT & PLAN NOTE
Suspicion for hyperthyroidism. Possible thyroid storm   Over the past couple of months with abdominal discomfort, nausea, acid reflux, diarrhea, palpitations and anxiety.   Lost about 20 lbs in the last 5-6 weeks   3 syncopal episodes since 11/27. Had colonoscopy on 11/27   Price-Wartofsky Point Scale: 40   Gi symptoms (10), -129 (15), CHF mild edema (5), precipitating event (10)  Mother with history of hyperthyroidism. Had thyroid removed per daughter  Out of network labwork performed on 11/25/24  TSH 0.005. Free T4 4.12   Lab work here  TSH < 0.010   Free T4 3.95   TSI pending   TRAb pending   T3 pending   Reached out to endocrinology. Appreciate their recommendations   Endocrinology consult appreciated  Patient was started on hydrocortisone and PTU  We will switch to methimazole and DC hydrocortisone  Continue on propranolol  Neurology follow-up

## 2024-12-04 NOTE — PLAN OF CARE
Problem: SAFETY ADULT  Goal: Patient will remain free of falls  Description: INTERVENTIONS:  - Educate patient/family on patient safety including physical limitations  - Instruct patient to call for assistance with activity   - Consult OT/PT to assist with strengthening/mobility   - Keep Call bell within reach  - Keep bed low and locked with side rails adjusted as appropriate  - Keep care items and personal belongings within reach  - Initiate and maintain comfort rounds  - Make Fall Risk Sign visible to staff  - Offer Toileting every 2 Hours, in advance of need  - Initiate/Maintain 2alarm  - Obtain necessary fall risk management equipment: 2  - Apply yellow socks and bracelet for high fall risk patients  - Consider moving patient to room near nurses station  Outcome: Progressing  Goal: Maintain or return to baseline ADL function  Description: INTERVENTIONS:  -  Assess patient's ability to carry out ADLs; assess patient's baseline for ADL function and identify physical deficits which impact ability to perform ADLs (bathing, care of mouth/teeth, toileting, grooming, dressing, etc.)  - Assess/evaluate cause of self-care deficits   - Assess range of motion  - Assess patient's mobility; develop plan if impaired  - Assess patient's need for assistive devices and provide as appropriate  - Encourage maximum independence but intervene and supervise when necessary  - Involve family in performance of ADLs  - Assess for home care needs following discharge   - Consider OT consult to assist with ADL evaluation and planning for discharge  - Provide patient education as appropriate  Outcome: Progressing  Goal: Maintains/Returns to pre admission functional level  Description: INTERVENTIONS:  - Perform AM-PAC 6 Click Basic Mobility/ Daily Activity assessment daily.  - Set and communicate daily mobility goal to care team and patient/family/caregiver.   - Collaborate with rehabilitation services on mobility goals if consulted  -  Perform Range of Motion 2 times a day.  - Reposition patient every 2 hours.  - Dangle patient 2 times a day  - Stand patient 2 times a day  - Ambulate patient 2 times a day  - Out of bed to chair 2 times a day   - Out of bed for meals 2 times a day  - Out of bed for toileting  - Record patient progress and toleration of activity level   Outcome: Progressing     Problem: DISCHARGE PLANNING  Goal: Discharge to home or other facility with appropriate resources  Description: INTERVENTIONS:  - Identify barriers to discharge w/patient and caregiver  - Arrange for needed discharge resources and transportation as appropriate  - Identify discharge learning needs (meds, wound care, etc.)  - Arrange for interpretive services to assist at discharge as needed  - Refer to Case Management Department for coordinating discharge planning if the patient needs post-hospital services based on physician/advanced practitioner order or complex needs related to functional status, cognitive ability, or social support system  Outcome: Progressing     Problem: Knowledge Deficit  Goal: Patient/family/caregiver demonstrates understanding of disease process, treatment plan, medications, and discharge instructions  Description: Complete learning assessment and assess knowledge base.  Interventions:  - Provide teaching at level of understanding  - Provide teaching via preferred learning methods  Outcome: Progressing     Problem: NEUROSENSORY - ADULT  Goal: Achieves stable or improved neurological status  Description: INTERVENTIONS  - Monitor and report changes in neurological status  - Monitor vital signs such as temperature, blood pressure, glucose, and any other labs ordered   - Initiate measures to prevent increased intracranial pressure  - Monitor for seizure activity and implement precautions if appropriate      Outcome: Progressing     Problem: CARDIOVASCULAR - ADULT  Goal: Maintains optimal cardiac output and hemodynamic  stability  Description: INTERVENTIONS:  - Monitor I/O, vital signs and rhythm  - Monitor for S/S and trends of decreased cardiac output  - Administer and titrate ordered vasoactive medications to optimize hemodynamic stability  - Assess quality of pulses, skin color and temperature  - Assess for signs of decreased coronary artery perfusion  - Instruct patient to report change in severity of symptoms  Outcome: Progressing  Goal: Absence of cardiac dysrhythmias or at baseline rhythm  Description: INTERVENTIONS:  - Continuous cardiac monitoring, vital signs, obtain 12 lead EKG if ordered  - Administer antiarrhythmic and heart rate control medications as ordered  - Monitor electrolytes and administer replacement therapy as ordered  Outcome: Progressing     Problem: METABOLIC, FLUID AND ELECTROLYTES - ADULT  Goal: Electrolytes maintained within normal limits  Description: INTERVENTIONS:  - Monitor labs and assess patient for signs and symptoms of electrolyte imbalances  - Administer electrolyte replacement as ordered  - Monitor response to electrolyte replacements, including repeat lab results as appropriate  - Instruct patient on fluid and nutrition as appropriate  Outcome: Progressing  Goal: Fluid balance maintained  Description: INTERVENTIONS:  - Monitor labs   - Monitor I/O and WT  - Instruct patient on fluid and nutrition as appropriate  - Assess for signs & symptoms of volume excess or deficit  Outcome: Progressing  Goal: Glucose maintained within target range  Description: INTERVENTIONS:  - Monitor Blood Glucose as ordered  - Assess for signs and symptoms of hyperglycemia and hypoglycemia  - Administer ordered medications to maintain glucose within target range  - Assess nutritional intake and initiate nutrition service referral as needed  Outcome: Progressing     Problem: Nutrition/Hydration-ADULT  Goal: Nutrient/Hydration intake appropriate for improving, restoring or maintaining nutritional needs  Description:  Monitor and assess patient's nutrition/hydration status for malnutrition. Collaborate with interdisciplinary team and initiate plan and interventions as ordered.  Monitor patient's weight and dietary intake as ordered or per policy. Utilize nutrition screening tool and intervene as necessary. Determine patient's food preferences and provide high-protein, high-caloric foods as appropriate.     INTERVENTIONS:  - Monitor oral intake, urinary output, labs, and treatment plans  - Assess nutrition and hydration status and recommend course of action  - Evaluate amount of meals eaten  - Assist patient with eating if necessary   - Allow adequate time for meals  - Recommend/ encourage appropriate diets, oral nutritional supplements, and vitamin/mineral supplements  - Order, calculate, and assess calorie counts as needed  - Recommend, monitor, and adjust tube feedings and TPN/PPN based on assessed needs  - Assess need for intravenous fluids  - Provide specific nutrition/hydration education as appropriate  - Include patient/family/caregiver in decisions related to nutrition  Outcome: Progressing

## 2024-12-05 VITALS
TEMPERATURE: 98.1 F | SYSTOLIC BLOOD PRESSURE: 157 MMHG | BODY MASS INDEX: 44.36 KG/M2 | OXYGEN SATURATION: 97 % | HEIGHT: 59 IN | WEIGHT: 220.02 LBS | DIASTOLIC BLOOD PRESSURE: 86 MMHG | HEART RATE: 91 BPM | RESPIRATION RATE: 14 BRPM

## 2024-12-05 LAB
ALBUMIN SERPL BCG-MCNC: 3.3 G/DL (ref 3.5–5)
ALP SERPL-CCNC: 40 U/L (ref 34–104)
ALT SERPL W P-5'-P-CCNC: 18 U/L (ref 7–52)
ANION GAP SERPL CALCULATED.3IONS-SCNC: 7 MMOL/L (ref 4–13)
AST SERPL W P-5'-P-CCNC: 17 U/L (ref 13–39)
BASOPHILS # BLD AUTO: 0.01 THOUSANDS/ÂΜL (ref 0–0.1)
BASOPHILS NFR BLD AUTO: 0 % (ref 0–1)
BILIRUB SERPL-MCNC: 0.23 MG/DL (ref 0.2–1)
BUN SERPL-MCNC: 15 MG/DL (ref 5–25)
CALCIUM ALBUM COR SERPL-MCNC: 9.5 MG/DL (ref 8.3–10.1)
CALCIUM SERPL-MCNC: 8.9 MG/DL (ref 8.4–10.2)
CHLORIDE SERPL-SCNC: 109 MMOL/L (ref 96–108)
CO2 SERPL-SCNC: 24 MMOL/L (ref 21–32)
CREAT SERPL-MCNC: 0.52 MG/DL (ref 0.6–1.3)
EOSINOPHIL # BLD AUTO: 0.04 THOUSAND/ÂΜL (ref 0–0.61)
EOSINOPHIL NFR BLD AUTO: 1 % (ref 0–6)
ERYTHROCYTE [DISTWIDTH] IN BLOOD BY AUTOMATED COUNT: 13.9 % (ref 11.6–15.1)
GFR SERPL CREATININE-BSD FRML MDRD: 114 ML/MIN/1.73SQ M
GLUCOSE SERPL-MCNC: 96 MG/DL (ref 65–140)
HCT VFR BLD AUTO: 34.9 % (ref 34.8–46.1)
HGB BLD-MCNC: 10.8 G/DL (ref 11.5–15.4)
IMM GRANULOCYTES # BLD AUTO: 0.03 THOUSAND/UL (ref 0–0.2)
IMM GRANULOCYTES NFR BLD AUTO: 1 % (ref 0–2)
LYMPHOCYTES # BLD AUTO: 2.75 THOUSANDS/ÂΜL (ref 0.6–4.47)
LYMPHOCYTES NFR BLD AUTO: 52 % (ref 14–44)
MAGNESIUM SERPL-MCNC: 1.8 MG/DL (ref 1.9–2.7)
MCH RBC QN AUTO: 23.9 PG (ref 26.8–34.3)
MCHC RBC AUTO-ENTMCNC: 30.9 G/DL (ref 31.4–37.4)
MCV RBC AUTO: 77 FL (ref 82–98)
MONOCYTES # BLD AUTO: 0.48 THOUSAND/ÂΜL (ref 0.17–1.22)
MONOCYTES NFR BLD AUTO: 9 % (ref 4–12)
NEUTROPHILS # BLD AUTO: 1.94 THOUSANDS/ÂΜL (ref 1.85–7.62)
NEUTS SEG NFR BLD AUTO: 37 % (ref 43–75)
NRBC BLD AUTO-RTO: 0 /100 WBCS
PLATELET # BLD AUTO: 288 THOUSANDS/UL (ref 149–390)
PMV BLD AUTO: 9.9 FL (ref 8.9–12.7)
POTASSIUM SERPL-SCNC: 4 MMOL/L (ref 3.5–5.3)
PROT SERPL-MCNC: 5.8 G/DL (ref 6.4–8.4)
RBC # BLD AUTO: 4.51 MILLION/UL (ref 3.81–5.12)
SODIUM SERPL-SCNC: 140 MMOL/L (ref 135–147)
TSH RECEP AB SER-ACNC: 3.9 IU/L (ref 0–1.75)
TSI SER-ACNC: 2.36 IU/L (ref 0–0.55)
WBC # BLD AUTO: 5.25 THOUSAND/UL (ref 4.31–10.16)

## 2024-12-05 PROCEDURE — 85025 COMPLETE CBC W/AUTO DIFF WBC: CPT | Performed by: INTERNAL MEDICINE

## 2024-12-05 PROCEDURE — 80053 COMPREHEN METABOLIC PANEL: CPT | Performed by: INTERNAL MEDICINE

## 2024-12-05 PROCEDURE — 99232 SBSQ HOSP IP/OBS MODERATE 35: CPT | Performed by: INTERNAL MEDICINE

## 2024-12-05 PROCEDURE — 83735 ASSAY OF MAGNESIUM: CPT | Performed by: INTERNAL MEDICINE

## 2024-12-05 PROCEDURE — 99239 HOSP IP/OBS DSCHRG MGMT >30: CPT | Performed by: INTERNAL MEDICINE

## 2024-12-05 RX ORDER — MAGNESIUM SULFATE HEPTAHYDRATE 40 MG/ML
2 INJECTION, SOLUTION INTRAVENOUS ONCE
Status: COMPLETED | OUTPATIENT
Start: 2024-12-05 | End: 2024-12-05

## 2024-12-05 RX ORDER — METHIMAZOLE 10 MG/1
20 TABLET ORAL EVERY 12 HOURS SCHEDULED
Qty: 120 TABLET | Refills: 0 | Status: SHIPPED | OUTPATIENT
Start: 2024-12-05 | End: 2024-12-13 | Stop reason: SDUPTHER

## 2024-12-05 RX ORDER — PROPRANOLOL HYDROCHLORIDE 80 MG/1
160 CAPSULE, EXTENDED RELEASE ORAL DAILY
Status: DISCONTINUED | OUTPATIENT
Start: 2024-12-05 | End: 2024-12-05 | Stop reason: HOSPADM

## 2024-12-05 RX ORDER — PROPRANOLOL HYDROCHLORIDE 160 MG/1
160 CAPSULE, EXTENDED RELEASE ORAL DAILY
Qty: 30 CAPSULE | Refills: 0 | Status: SHIPPED | OUTPATIENT
Start: 2024-12-05 | End: 2024-12-13

## 2024-12-05 RX ADMIN — PROPRANOLOL HYDROCHLORIDE 160 MG: 80 CAPSULE, EXTENDED RELEASE ORAL at 15:16

## 2024-12-05 RX ADMIN — HEPARIN SODIUM 5000 UNITS: 5000 INJECTION, SOLUTION INTRAVENOUS; SUBCUTANEOUS at 13:45

## 2024-12-05 RX ADMIN — FERROUS SULFATE TAB 325 MG (65 MG ELEMENTAL FE) 325 MG: 325 (65 FE) TAB at 09:45

## 2024-12-05 RX ADMIN — PANTOPRAZOLE SODIUM 40 MG: 40 TABLET, DELAYED RELEASE ORAL at 05:33

## 2024-12-05 RX ADMIN — HEPARIN SODIUM 5000 UNITS: 5000 INJECTION, SOLUTION INTRAVENOUS; SUBCUTANEOUS at 05:35

## 2024-12-05 RX ADMIN — PROPRANOLOL HYDROCHLORIDE 60 MG: 20 TABLET ORAL at 05:33

## 2024-12-05 RX ADMIN — MAGNESIUM SULFATE HEPTAHYDRATE 2 G: 2 INJECTION, SOLUTION INTRAVENOUS at 13:36

## 2024-12-05 RX ADMIN — FERROUS SULFATE TAB 325 MG (65 MG ELEMENTAL FE) 325 MG: 325 (65 FE) TAB at 13:43

## 2024-12-05 RX ADMIN — PRAVASTATIN SODIUM 20 MG: 20 TABLET ORAL at 09:45

## 2024-12-05 RX ADMIN — METHIMAZOLE 20 MG: 5 TABLET ORAL at 09:45

## 2024-12-05 NOTE — ASSESSMENT & PLAN NOTE
Presents due to multiple syncopal episodes since 11/27. Had colonoscopy on 11/27   CT head unremarkable.  Small suspicion for PE. However TSH level undetectable. Unable to have contrast study with risk of causing thyroid storm.  TSH < 0.0010   3.95  Trop 18, 21    EKG sinus tachycardia   Mag 1.8  Plan    VQ scan is normal  Echocardiogram ejection fraction 65 to 70% with  wall motion is normal  Cleared by cardiology for discharge.  Outpatient follow-up for Zio patch for 14 days

## 2024-12-05 NOTE — DISCHARGE SUMMARY
Discharge Summary - Hospitalist   Name: Chen Peace 44 y.o. female I MRN: [id number]  Unit/Bed#: -01 I Date of Admission: 12/2/2024   Date of Service: 12/5/2024 I Hospital Day: 1     Assessment & Plan  Syncope  Presents due to multiple syncopal episodes since 11/27. Had colonoscopy on 11/27   CT head unremarkable.  Small suspicion for PE. However TSH level undetectable. Unable to have contrast study with risk of causing thyroid storm.  TSH < 0.0010   3.95  Trop 18, 21    EKG sinus tachycardia   Mag 1.8  Plan    VQ scan is normal  Echocardiogram ejection fraction 65 to 70% with  wall motion is normal  Cleared by cardiology for discharge.  Outpatient follow-up for Zio patch for 14 days  Low TSH level  Suspicion for hyperthyroidism. Possible thyroid storm   Over the past couple of months with abdominal discomfort, nausea, acid reflux, diarrhea, palpitations and anxiety.   Lost about 20 lbs in the last 5-6 weeks   3 syncopal episodes since 11/27. Had colonoscopy on 11/27   Price-Wartofsky Point Scale: 40   Gi symptoms (10), -129 (15), CHF mild edema (5), precipitating event (10)  Mother with history of hyperthyroidism. Had thyroid removed per daughter  Out of network labwork performed on 11/25/24  TSH 0.005. Free T4 4.12   Lab work here  TSH < 0.010   Free T4 3.95   TSI 2.36  TRAb 3.90  T3 3.1  Reached out to endocrinology. Appreciate their recommendations   Endocrinology consult appreciated  Patient was started on hydrocortisone and PTU  We will switch to methimazole and DC hydrocortisone  On propranolol  Discussed with endocrinology and they recommended patient can be discharged on long-acting propranolol and methimazole.  Endocrinology did make an appointment on December 13.  Discussed with patient and she is in agreement with the discharge plan and outpatient follow-up    Tachycardia  -120s   Trop WNL   EKG: sinus tachycardia   Has ongoing palpitations. Denies chest pain.   Propranolol 60  mg q 6 hours.  Patient will be transition to long-acting propranolol  Resolved  Essential hypertension  Home regimen: atenolol 50 mg daily   Hold while here. Receiving propranolol every 6 hours due to concern for hyperthyroidism.   Will be discharged on long-acting propranolol  Morbid obesity (HCC)  Body mass index is 44.44 kg/m².  Encourage weight loss and lifestyle modification   Hospital Course:     Chen Peace is a 44 y.o. female patient who originally presented to the hospital on   Admission Orders (From admission, onward)       Ordered        12/04/24 0753  INPATIENT ADMISSION  Once            12/02/24 2033  Place in Observation  Once                         due to syncopal episodes.  Patient was found to have hyperthyroidism likely thyrotoxicosis.  Patient was seen by cardiology and endocrinology.  Patient was started on PTU, Solu-Cortef and propranolol.  Patient blood pressure and heart rates are stable.  Patient is cleared by endocrinology and cardiology with outpatient follow-up  Patient will be discharged on methimazole 20 mg twice daily and propranolol  mg daily and follow-up with endocrinology in December 13  On Exam-  Chest-bilateral air entry, clear to auscultation  Abdomen-soft, nontender  Heart-S1 S2 regular  Extremities-no pedal edema or calf tenderness  Neuro-alert awake oriented x3.  No focal deficits    Please see above list of diagnoses and related plan for additional information.   Follow-up with PCP as outpatient  Follow-up with endocrinology Dr. Judd on December 13, 2024  Outpatient follow-up with cardiology for Zio patch    CONSULTING PROVIDERS   IP CONSULT TO ENDOCRINOLOGY  IP CONSULT TO CASE MANAGEMENT  IP CONSULT TO NUTRITION SERVICES  IP CONSULT TO CARDIOLOGY    PROCEDURES PERFORMED  * No surgery found *    RADIOLOGY RESULTS  Echo complete w/ contrast if indicated  Result Date: 12/3/2024  Narrative:   Left Ventricle: Left ventricular cavity size is normal. Wall thickness is  mildly increased. There is concentric remodeling. The left ventricular ejection fraction is 65-70%. Systolic function is vigorous. Wall motion is normal.   Right Ventricle: Right ventricular cavity size is normal. Systolic function is normal.   Left Atrium: The atrium is normal in size.   Right Atrium: The atrium is normal in size.     NM lung ventilation / perfusion  Result Date: 12/3/2024  Narrative: VENTILATION AND PERFUSION SCAN INDICATION: Tachycardia, syncope, recent surgery COMPARISON: Chest radiograph and chest CT 12/2/24 TECHNIQUE:  Posterior ventilation imaging was performed after the inhalation of 6.6 mCi Xe-133 gas.  Multiplanar perfusion imaging was next performed following the intravenous administration of 4.0 mCi Tc-99m labeled MAA. FINDINGS: Ventilation imaging demonstrates normal distribution of radiopharmaceutical throughout both lungs. Perfusion imaging demonstrates homogeneous distribution of the radiopharmaceutical throughout both lungs. There is no segmental or subsegmental defect.     Impression: Normal exam. Resident: SYMONE RAMIREZ I, the attending radiologist, have reviewed the images and agree with the final report above. Workstation performed: [id number]     CT chest abdomen pelvis wo contrast  Result Date: 12/2/2024  Narrative: CT CHEST, ABDOMEN AND PELVIS WITHOUT IV CONTRAST INDICATION: syncope, high risk wells,. COMPARISON: None. TECHNIQUE: CT examination of the chest, abdomen and pelvis was performed without intravenous contrast. Multiplanar 2D reformatted images were created from the source data. This examination, like all CT scans performed in the [facility], was performed utilizing techniques to minimize radiation dose exposure, including the use of iterative reconstruction and automated exposure control. Radiation dose length product (DLP) for this visit: 1505 mGy-cm Enteric Contrast: Not administered. FINDINGS: CHEST LUNGS: Lungs are clear. No tracheal or  endobronchial lesion. PLEURA: Unremarkable. HEART/GREAT VESSELS: Large amount of coronary calcification in the left anterior descending, left circumflex and right coronary arteries. No thoracic aortic aneurysm. MEDIASTINUM AND CINDY: Unremarkable. CHEST WALL AND LOWER NECK: Unremarkable. ABDOMEN LIVER/BILIARY TREE: Unremarkable. GALLBLADDER: 9 mm gallstone in the gallbladder. No gallbladder wall thickening or para cholecystic fluid. SPLEEN: Unremarkable. PANCREAS: Unremarkable. ADRENAL GLANDS: 7 mm low-attenuation focus in the medial limb of the left adrenal gland on image 102 of series 2. No specific imaging follow-up necessary. The right adrenal gland is normal. KIDNEYS/URETERS: Unremarkable. No hydronephrosis. STOMACH AND BOWEL: Unremarkable. APPENDIX: No findings to suggest appendicitis. The appendix is normal. ABDOMINOPELVIC CAVITY: No ascites. No pneumoperitoneum. No lymphadenopathy. Visceral abdominal obesity. VESSELS: Unremarkable for patient's age. PELVIS REPRODUCTIVE ORGANS: Relative enlargement of the uterus that measures 6.1 cm in transverse dimension. This may indicate uterine adenomyosis or uterine fibroids. URINARY BLADDER: Unremarkable. ABDOMINAL WALL/INGUINAL REGIONS: Unremarkable. BONES: No acute fracture or suspicious osseous lesion. There is osseous fusion of the sternomanubrial joint. Mild degenerative change of the left sternoclavicular joint. Sclerosis along the medial left clavicle probably related osseous remodeling from degenerative change.     Impression: Prominent coronary artery calcification for age. Coronary stenosis is possible. Visceral abdominal obesity. Relative enlargement of the uterus that may indicate uterine adenomyosis or fibroids. Cholelithiasis without evidence of acute cholecystitis. No acute traumatic injury of the chest, abdomen or pelvis. Workstation performed: [id number]     CT spine cervical without contrast  Result Date: 12/2/2024  Narrative: CT CERVICAL SPINE -  WITHOUT CONTRAST INDICATION:   loc with syncope. COMPARISON:  None. TECHNIQUE:  CT examination of the cervical spine was performed without intravenous contrast.  Contiguous axial images were obtained. Multiplanar 2D reformatted images were created from the source data. Radiation dose length product (DLP) for this visit:  366 mGy-cm .  This examination, like all CT scans performed in the [facility], was performed utilizing techniques to minimize radiation dose exposure, including the use of iterative reconstruction and automated exposure control. IMAGE QUALITY:  Diagnostic. FINDINGS: ALIGNMENT:  Normal alignment of the cervical spine. VERTEBRAE:  No acute fracture. DEGENERATIVE CHANGES:  Spondylotic changes without acute critical spinal canal stenosis. PREVERTEBRAL AND PARASPINAL SOFT TISSUES: No acute abnormality. THORACIC INLET:  No acute abnormality.     Impression: No acute cervical spine fracture or traumatic malalignment. Workstation performed: [id number]     CT head without contrast  Result Date: 12/2/2024  Narrative: CT BRAIN - WITHOUT CONTRAST INDICATION:   loc with syncope. COMPARISON: MRI brain 1/4/2007. TECHNIQUE:  CT examination of the brain was performed.  Multiplanar 2D reformatted images were created from the source data. Radiation dose length product (DLP) for this visit:  862 mGy-cm .  This examination, like all CT scans performed in the [facility], was performed utilizing techniques to minimize radiation dose exposure, including the use of iterative reconstruction and automated exposure control. IMAGE QUALITY:  Motion degraded which reduces diagnostic sensitivity. FINDINGS: PARENCHYMA:No acute intracranial hemorrhage, mass effect or midline shift. VENTRICLES AND EXTRA-AXIAL SPACES:  Normal for the patient's age. VISUALIZED ORBITS: No acute abnormality. PARANASAL SINUSES: Mild mucosal thickening of the visualized paranasal sinuses. CALVARIUM AND EXTRACRANIAL SOFT  TISSUES: No acute abnormality.     Impression: Within the confines of a motion degraded study, no acute intracranial hemorrhage, mass effect or midline shift. Workstation performed: [id number]     X-ray chest 1 view portable  Result Date: 12/2/2024  Narrative: XR CHEST PORTABLE INDICATION: chest pain. COMPARISON: November 27, 2018 and March 30, 2006 FINDINGS: Clear lungs. No pneumothorax or pleural effusion. Normal cardiomediastinal silhouette. Bones are unremarkable for age. Normal upper abdomen.     Impression: No acute cardiopulmonary disease. Workstation performed: [id number]     Colonoscopy  Result Date: 11/27/2024  Narrative: Table formatting from the original result was not included. [facility] Endoscopy [address] [id number] [phone] DATE OF SERVICE: 11/27/24 PHYSICIAN(S): Attending: Chico Padilla DO Fellow: No Staff Documented INDICATION: Screening for colon cancer POST-OP DIAGNOSIS: See the impression below. HISTORY: Prior colonoscopy: No prior colonoscopy. BOWEL PREPARATION: Clenpiq PREPROCEDURE: Informed consent was obtained for the procedure, including sedation. Risks including but not limited to bleeding, infection, perforation, adverse drug reaction and aspiration were explained in detail. Also explained about less than 100% sensitivity with the exam and other alternatives. The patient was placed in the left lateral decubitus position. Procedure: Colonoscopy DETAILS OF PROCEDURE: Patient was taken to the procedure room where a time out was performed to confirm correct patient and correct procedure. The patient underwent monitored anesthesia care, which was administered by an anesthesia professional. The patient's blood pressure, heart rate, oxygen and respirations were monitored throughout the procedure. A digital rectal exam was performed. The scope was introduced through the anus and advanced to the terminal ileum. Retroflexion was performed in the  rectum. The quality of bowel preparation was evaluated using the [place] Bowel Preparation Scale with scores of: right colon = 3, transverse colon = 3, left colon = 3. The total BBPS score was 9. Bowel prep was adequate. The patient's estimated blood loss was minimal (<5 mL). The procedure was not difficult. The patient tolerated the procedure well. There were no apparent adverse events. ANESTHESIA INFORMATION: ASA: III Anesthesia Type: IV Sedation with Anesthesia MEDICATIONS: No administrations occurring from 1325 to 1358 on 11/27/24 FINDINGS: One sessile and benign-appearing polyp measuring smaller than 5 mm in the ascending colon; performed cold snare with complete en bloc removal and retrieved specimen One sessile and benign-appearing polyp measuring smaller than 5 mm in the transverse colon; performed cold forceps biopsy with complete en bloc removal Internal small, protruding hemorrhoids observed during retroflexion; no bleeding was observed EVENTS: Procedure Events Event Event Time ENDO SCOPE OUT TIME 11/27/2024  1:42 PM ENDO CECUM REACHED 11/27/2024  1:47 PM ENDO SCOPE OUT TIME 11/27/2024  1:56 PM SPECIMENS: ID Type Source Tests Collected by Time Destination 1 : r/o celiac Tissue Duodenum TISSUE EXAM Chico Lund Randy, DO 11/27/2024  1:40 PM  2 : r/o H.Pylori Tissue Stomach TISSUE EXAM Chico Lund Randy, DO 11/27/2024  1:40 PM  3 : polyp Tissue Large Intestine, Right/Ascending Colon TISSUE EXAM Chicoelijah Lund Randy, DO 11/27/2024  1:49 PM  4 : polyp Tissue Large Intestine, Transverse Colon TISSUE EXAM Chico Lund Randy, DO 11/27/2024  1:53 PM  EQUIPMENT: Colonoscope -[id number]     Impression: 2 subcentimeter polyps Small, protruding hemorrhoids RECOMMENDATION: Repeat colonoscopy in 7 years, due: 11/26/2031 Personal history of colon polyps  Await pathology results Your colon and small bowel (terminal ileum) appeared healthy. We removed two small polyps which were benign in appearance. We will contact you  with the results in 1-2 weeks. Due to the finding and removal of two small polyps, we recommend repeating your colonoscopy in 7 years.    Chico Padilla DO     EGD  Result Date: 11/27/2024  Narrative: Table formatting from the original result was not included. [facility] Endoscopy [address] [facility] [id number] [phone] DATE OF SERVICE: 11/27/24 PHYSICIAN(S): Attending: Chico Padilla DO Fellow: No Staff Documented INDICATION: Gastroesophageal reflux disease, unspecified whether esophagitis present POST-OP DIAGNOSIS: See the impression below. PREPROCEDURE: Informed consent was obtained for the procedure, including sedation.  Risks of perforation, hemorrhage, adverse drug reaction and aspiration were discussed. The patient was placed in the left lateral decubitus position. Patient was explained about the risks and benefits of the procedure. Risks including but not limited to bleeding, infection, and perforation were explained in detail. Also explained about less than 100% sensitivity with the exam and other alternatives. PROCEDURE: EGD DETAILS OF PROCEDURE: Patient was taken to the procedure room where a time out was performed to confirm correct patient and correct procedure. The patient underwent monitored anesthesia care, which was administered by an anesthesia professional. The patient's blood pressure, heart rate, oxygen and respirations were monitored throughout the procedure. The scope was introduced through the mouth and advanced to the second part of the duodenum. Retroflexion was performed in the fundus. The patient's estimated blood loss was minimal (<5 mL). The procedure was not difficult. The patient tolerated the procedure well. There were no apparent adverse events. ANESTHESIA INFORMATION: ASA: III Anesthesia Type: IV Sedation with Anesthesia MEDICATIONS: No administrations occurring from 1325 to 1343 on 11/27/24 FINDINGS: The upper third of the  esophagus, middle third of the esophagus, lower third of the esophagus, GE junction and Z-line appeared normal. Z-line is 36 cm from the incisors. The stomach appeared normal. Performed random biopsy using biopsy forceps to rule out H. pylori. The duodenal bulb, 1st part of the duodenum and 2nd part of the duodenum appeared normal. Performed random biopsy using biopsy forceps to rule out celiac disease. SPECIMENS: ID Type Source Tests Collected by Time Destination 1 : r/o celiac Tissue Duodenum TISSUE EXAM Chicoelijah Lund Randy, DO 11/27/2024  1:40 PM  2 : r/o H.Pylori Tissue Stomach TISSUE EXAM Chico Padilla, DO 11/27/2024  1:40 PM      Impression: The upper third of the esophagus, middle third of the esophagus, lower third of the esophagus, GE junction and Z-line appeared normal. The stomach appeared normal. Performed random biopsy to rule out H. pylori. The duodenal bulb, 1st part of the duodenum and 2nd part of the duodenum appeared normal. Performed random biopsy to rule out celiac disease. RECOMMENDATION: Await pathology results Proceed with colonoscopy Your esophagus, stomach and small intestine (duodenum) did not have any significant abnormalities on your endoscopy today. We did biopsy your small intestine and stomach. I am glad to hear you are improving on the acid suppression medication. Please continue that for now and we will contact you with the biopsy results in 1-2 weeks.    Chico Padilla, DO       LABS  Results from last 7 days   Lab Units 12/05/24 0527 12/04/24 0325 12/03/24 0437 12/02/24  1602   WBC Thousand/uL 5.25 5.26 4.06* 3.76*   HEMOGLOBIN g/dL 10.8* 11.0* 11.6 12.4   HEMATOCRIT % 34.9 35.9 40.5 39.2   MCV fL 77* 78* 82 77*   PLATELETS Thousands/uL 288 295 231 371     Results from last 7 days   Lab Units 12/05/24 0527 12/04/24  0325 12/03/24 0437 12/02/24  1602   SODIUM mmol/L 140 137 137 140   POTASSIUM mmol/L 4.0 4.6 4.9 4.9   CHLORIDE mmol/L 109* 108 107 105   CO2 mmol/L 24 24  21 26   BUN mg/dL 15 13 9 8   CREATININE mg/dL 0.52* 0.45* 0.48* 0.44*   CALCIUM mg/dL 8.9 9.1 9.4 10.3*   ALBUMIN g/dL 3.3*  --  3.8 4.2   TOTAL BILIRUBIN mg/dL 0.23  --  0.39 0.37   ALK PHOS U/L 40  --  51 58   ALT U/L 18  --  19 23   AST U/L 17  --  26 29   EGFR ml/min/1.73sq m 114 120 118 121   GLUCOSE RANDOM mg/dL 96 138 138 95     Results from last 7 days   Lab Units 12/02/24  2042 12/02/24  1817 12/02/24  1602   HS TNI 0HR ng/L  --   --  18   HS TNI 2HR ng/L  --  21  --    HS TNI 4HR ng/L 21  --   --                       Results from last 7 days   Lab Units 12/02/24  1817 12/02/24  1602   TSH 3RD GENERATON uIU/mL  --  <0.010*   FREE T4 ng/dL 3.95*  --                Cultures:   Results from last 7 days   Lab Units 12/02/24  1601   COLOR UA  Yellow   CLARITY UA  Slightly Cloudy   SPEC GRAV UA  <1.005*   PH UA  6.0   LEUKOCYTES UA  Negative   NITRITE UA  Negative   GLUCOSE UA mg/dl Negative   KETONES UA mg/dl Negative   BILIRUBIN UA  Negative   BLOOD UA  Large*      Results from last 7 days   Lab Units 12/02/24  1601   RBC UA /hpf 1-2   WBC UA /hpf 0-1   EPITHELIAL CELLS WET PREP /hpf Occasional   BACTERIA UA /hpf Moderate*            Condition at Discharge:  good      Discharge instructions/Information to patient and family:   See after visit summary for information provided to patient and family.      Provisions for Follow-Up Care:  See after visit summary for information related to follow-up care and any pertinent home health orders.      Disposition:     Home       Discharge Statement:  I spent 40 minutes discharging the patient. This time was spent on the day of discharge. I had direct contact with the patient on the day of discharge. Greater than 50% of the total time was spent examining patient, answering all patient questions, arranging and discussing plan of care with patient as well as directly providing post-discharge instructions.  Additional time then spent on discharge activities.    Discharge  Medications:  See after visit summary for reconciled discharge medications provided to patient and family.      ** Please Note: This note has been constructed using a voice recognition system **

## 2024-12-05 NOTE — DISCHARGE INSTR - AVS FIRST PAGE
Follow-up with PCP as outpatient  Follow-up with endocrinology Dr. Judd on December 13, 2024  Outpatient follow-up with cardiology for Zio patch  Follow-up with PCP to obtain clearance prior to returning to work

## 2024-12-05 NOTE — ASSESSMENT & PLAN NOTE
Historical - patient on atenolol 50 mg daily   Hypertensive on arrival 228/128  Atenolol Dc'd and patient started on propanolol  - with good effect

## 2024-12-05 NOTE — NURSING NOTE
Patient to be discharged to home in stable condition. Instructions given to patient, spouse to take her home.

## 2024-12-05 NOTE — ASSESSMENT & PLAN NOTE
Home regimen: atenolol 50 mg daily   Hold while here. Receiving propranolol every 6 hours due to concern for hyperthyroidism.   Will be discharged on long-acting propranolol

## 2024-12-05 NOTE — ASSESSMENT & PLAN NOTE
Body mass index is 44.44 kg/m².  Encourage weight loss and lifestyle modification   Detail Level: Zone

## 2024-12-05 NOTE — PLAN OF CARE
Problem: SAFETY ADULT  Goal: Patient will remain free of falls  Description: INTERVENTIONS:  - Educate patient/family on patient safety including physical limitations  - Instruct patient to call for assistance with activity   - Consult OT/PT to assist with strengthening/mobility   - Keep Call bell within reach  - Keep bed low and locked with side rails adjusted as appropriate  - Keep care items and personal belongings within reach  - Initiate and maintain comfort rounds  - Make Fall Risk Sign visible to staff  - Offer Toileting every 2 Hours, in advance of need  - Initiate/Maintain 2alarm  - Obtain necessary fall risk management equipment: 2  - Apply yellow socks and bracelet for high fall risk patients  - Consider moving patient to room near nurses station  Outcome: Progressing  Goal: Maintain or return to baseline ADL function  Description: INTERVENTIONS:  -  Assess patient's ability to carry out ADLs; assess patient's baseline for ADL function and identify physical deficits which impact ability to perform ADLs (bathing, care of mouth/teeth, toileting, grooming, dressing, etc.)  - Assess/evaluate cause of self-care deficits   - Assess range of motion  - Assess patient's mobility; develop plan if impaired  - Assess patient's need for assistive devices and provide as appropriate  - Encourage maximum independence but intervene and supervise when necessary  - Involve family in performance of ADLs  - Assess for home care needs following discharge   - Consider OT consult to assist with ADL evaluation and planning for discharge  - Provide patient education as appropriate  Outcome: Progressing  Goal: Maintains/Returns to pre admission functional level  Description: INTERVENTIONS:  - Perform AM-PAC 6 Click Basic Mobility/ Daily Activity assessment daily.  - Set and communicate daily mobility goal to care team and patient/family/caregiver.   - Collaborate with rehabilitation services on mobility goals if consulted  -  Perform Range of Motion 2 times a day.  - Reposition patient every 2 hours.  - Dangle patient 2 times a day  - Stand patient 2 times a day  - Ambulate patient 2 times a day  - Out of bed to chair 2 times a day   - Out of bed for meals 2 times a day  - Out of bed for toileting  - Record patient progress and toleration of activity level   Outcome: Progressing     Problem: DISCHARGE PLANNING  Goal: Discharge to home or other facility with appropriate resources  Description: INTERVENTIONS:  - Identify barriers to discharge w/patient and caregiver  - Arrange for needed discharge resources and transportation as appropriate  - Identify discharge learning needs (meds, wound care, etc.)  - Arrange for interpretive services to assist at discharge as needed  - Refer to Case Management Department for coordinating discharge planning if the patient needs post-hospital services based on physician/advanced practitioner order or complex needs related to functional status, cognitive ability, or social support system  Outcome: Progressing     Problem: Knowledge Deficit  Goal: Patient/family/caregiver demonstrates understanding of disease process, treatment plan, medications, and discharge instructions  Description: Complete learning assessment and assess knowledge base.  Interventions:  - Provide teaching at level of understanding  - Provide teaching via preferred learning methods  Outcome: Progressing     Problem: NEUROSENSORY - ADULT  Goal: Achieves stable or improved neurological status  Description: INTERVENTIONS  - Monitor and report changes in neurological status  - Monitor vital signs such as temperature, blood pressure, glucose, and any other labs ordered   - Initiate measures to prevent increased intracranial pressure  - Monitor for seizure activity and implement precautions if appropriate      Outcome: Progressing     Problem: METABOLIC, FLUID AND ELECTROLYTES - ADULT  Goal: Electrolytes maintained within normal  limits  Description: INTERVENTIONS:  - Monitor labs and assess patient for signs and symptoms of electrolyte imbalances  - Administer electrolyte replacement as ordered  - Monitor response to electrolyte replacements, including repeat lab results as appropriate  - Instruct patient on fluid and nutrition as appropriate  Outcome: Progressing  Goal: Fluid balance maintained  Description: INTERVENTIONS:  - Monitor labs   - Monitor I/O and WT  - Instruct patient on fluid and nutrition as appropriate  - Assess for signs & symptoms of volume excess or deficit  Outcome: Progressing  Goal: Glucose maintained within target range  Description: INTERVENTIONS:  - Monitor Blood Glucose as ordered  - Assess for signs and symptoms of hyperglycemia and hypoglycemia  - Administer ordered medications to maintain glucose within target range  - Assess nutritional intake and initiate nutrition service referral as needed  Outcome: Progressing     Problem: Nutrition/Hydration-ADULT  Goal: Nutrient/Hydration intake appropriate for improving, restoring or maintaining nutritional needs  Description: Monitor and assess patient's nutrition/hydration status for malnutrition. Collaborate with interdisciplinary team and initiate plan and interventions as ordered.  Monitor patient's weight and dietary intake as ordered or per policy. Utilize nutrition screening tool and intervene as necessary. Determine patient's food preferences and provide high-protein, high-caloric foods as appropriate.     INTERVENTIONS:  - Monitor oral intake, urinary output, labs, and treatment plans  - Assess nutrition and hydration status and recommend course of action  - Evaluate amount of meals eaten  - Assist patient with eating if necessary   - Allow adequate time for meals  - Recommend/ encourage appropriate diets, oral nutritional supplements, and vitamin/mineral supplements  - Order, calculate, and assess calorie counts as needed  - Recommend, monitor, and adjust  tube feedings and TPN/PPN based on assessed needs  - Assess need for intravenous fluids  - Provide specific nutrition/hydration education as appropriate  - Include patient/family/caregiver in decisions related to nutrition  Outcome: Progressing

## 2024-12-05 NOTE — PROGRESS NOTES
"Progress Note - Cardiology   Name: Chen Peace 44 y.o. female I MRN: [id number]  Unit/Bed#: -01 I Date of Admission: 12/2/2024   Date of Service: 12/5/2024 I Hospital Day: 1    Assessment & Plan  Syncope  Patient reports not \"feeling herself: for over a month with vague symptoms along with fatigue, weight loss, nausea, abdominal discomfort and reflux.  Seen by her PCP and EGD/Colonoscopy was ordered along with lab work. EGD/Colonoscopy performed on 11/27 and patient reports having a syncopal episode upon discharge.  She was brought to the ED but was not seen at that time.  Patient reports on 12/2 while getting ready to go to work felt pre-syncopal and \"came to\" lying on her couch.  This prompted a phone call and visit to her primary care where she had another syncopal episode.  She was then transported to the ER for further evaluation.     Hospital workup   TTE 12/03/24:  EF 65-70%,  EKG: ST  Telemetry: Last 24 hours  HR 60's -100's- , no evidence of pauses  V/Q scan: normal   CT abd/pelvis: no acute process, prominent coronary artery calcification    CT head: no acute process   Low TSH level  Lab work from PCP visit on 11/25 reveled T3 < 0.005, Free T4 3.95.  Repeat lab work on admission: T3 <0.010, Free T4 3.95, T3 Total 3.1.  Patient seen by endocrinology and began treatment for hyperthyroidism.  Management per endocrinology   Tachycardia  In setting of hypothyroidism - patients HR  initially 120's - started on propanolol by endocrinology - HR 60-90's, no evidence of   Essential hypertension  Historical - patient on atenolol 50 mg daily   Hypertensive on arrival 228/128  Atenolol Dc'd and patient started on propanolol  - with good effect     Plan/recommendations  Live Zio patch x 14 days   Patient will follow up with cardiology to establish care     Subjective   Patient reporting some dizziness yesterday while lying in the bed, resolved without interventions. Ambulating without lightheadedness, dizziness " "today     Objective :  Temp:  [97.5 °F (36.4 °C)-98.1 °F (36.7 °C)] 98.1 °F (36.7 °C)  HR:  [68-88] 68  BP: (118-161)/(69-97) 129/69  Resp:  [14-19] 14  SpO2:  [96 %-99 %] 97 %  O2 Device: None (Room air)  Orthostatic Blood Pressures      Flowsheet Row Most Recent Value   Blood Pressure 129/69 filed at 12/05/2024 0730   Patient Position - Orthostatic VS Lying filed at 12/04/2024 2152          First Weight: Weight - Scale: 100 kg (221 lb) (12/02/24 1403)  Vitals:    12/03/24 0100 12/03/24 1309   Weight: 99.8 kg (220 lb) 99.8 kg (220 lb 0.3 oz)     Physical Exam  Constitutional:       Appearance: Normal appearance.   HENT:      Head: Normocephalic and atraumatic.      Mouth/Throat:      Mouth: Mucous membranes are moist.   Cardiovascular:      Rate and Rhythm: Normal rate and regular rhythm.   Pulmonary:      Effort: Pulmonary effort is normal.   Abdominal:      General: Bowel sounds are normal.      Palpations: Abdomen is soft.   Musculoskeletal:         General: Normal range of motion.   Skin:     General: Skin is warm and dry.      Capillary Refill: Capillary refill takes less than 2 seconds.   Neurological:      General: No focal deficit present.      Mental Status: She is alert and oriented to person, place, and time.         Lab Results: I have reviewed the following results:  Results from last 7 days   Lab Units 12/05/24 0527 12/04/24 0325 12/03/24  0437   WBC Thousand/uL 5.25 5.26 4.06*   HEMOGLOBIN g/dL 10.8* 11.0* 11.6   HEMATOCRIT % 34.9 35.9 40.5   PLATELETS Thousands/uL 288 295 231     Results from last 7 days   Lab Units 12/05/24 0527 12/04/24  0325 12/03/24  0437   POTASSIUM mmol/L 4.0 4.6 4.9   CHLORIDE mmol/L 109* 108 107   CO2 mmol/L 24 24 21   BUN mg/dL 15 13 9   CREATININE mg/dL 0.52* 0.45* 0.48*   CALCIUM mg/dL 8.9 9.1 9.4         No results found for: \"HGBA1C\"  No results found for: \"CKTOTAL\", \"CKMB\", \"CKMBINDEX\", \"TROPONINI\"    Imaging Results Review: No pertinent imaging studies " reviewed.  Other Study Results Review: No additional pertinent studies reviewed.    VTE Pharmacologic Prophylaxis: VTE covered by:  heparin (porcine), Subcutaneous, 5,000 Units at 12/05/24 1345     VTE Mechanical Prophylaxis: sequential compression device

## 2024-12-05 NOTE — ASSESSMENT & PLAN NOTE
In setting of hypothyroidism - patients HR  initially 120's - started on propanolol by endocrinology - HR 60-90's, no evidence of

## 2024-12-05 NOTE — ASSESSMENT & PLAN NOTE
-120s   Trop WNL   EKG: sinus tachycardia   Has ongoing palpitations. Denies chest pain.   Propranolol 60 mg q 6 hours.  Patient will be transition to long-acting propranolol  Resolved

## 2024-12-05 NOTE — ASSESSMENT & PLAN NOTE
"Patient reports not \"feeling herself: for over a month with vague symptoms along with fatigue, weight loss, nausea, abdominal discomfort and reflux.  Seen by her PCP and EGD/Colonoscopy was ordered along with lab work. EGD/Colonoscopy performed on 11/27 and patient reports having a syncopal episode upon discharge.  She was brought to the ED but was not seen at that time.  Patient reports on 12/2 while getting ready to go to work felt pre-syncopal and \"came to\" lying on her couch.  This prompted a phone call and visit to her primary care where she had another syncopal episode.  She was then transported to the ER for further evaluation.     Hospital workup   TTE 12/03/24:  EF 65-70%,  EKG: ST  Telemetry: Last 24 hours  HR 60's -100's- , no evidence of pauses  V/Q scan: normal   CT abd/pelvis: no acute process, prominent coronary artery calcification    CT head: no acute process   "

## 2024-12-05 NOTE — ASSESSMENT & PLAN NOTE
Suspicion for hyperthyroidism. Possible thyroid storm   Over the past couple of months with abdominal discomfort, nausea, acid reflux, diarrhea, palpitations and anxiety.   Lost about 20 lbs in the last 5-6 weeks   3 syncopal episodes since 11/27. Had colonoscopy on 11/27   Price-Wartofsky Point Scale: 40   Gi symptoms (10), -129 (15), CHF mild edema (5), precipitating event (10)  Mother with history of hyperthyroidism. Had thyroid removed per daughter  Out of network labwork performed on 11/25/24  TSH 0.005. Free T4 4.12   Lab work here  TSH < 0.010   Free T4 3.95   TSI 2.36  TRAb 3.90  T3 3.1  Reached out to endocrinology. Appreciate their recommendations   Endocrinology consult appreciated  Patient was started on hydrocortisone and PTU  We will switch to methimazole and DC hydrocortisone  On propranolol  Discussed with endocrinology and they recommended patient can be discharged on long-acting propranolol and methimazole.  Endocrinology did make an appointment on December 13.  Discussed with patient and she is in agreement with the discharge plan and outpatient follow-up

## 2024-12-06 ENCOUNTER — RESULTS FOLLOW-UP (OUTPATIENT)
Dept: GASTROENTEROLOGY | Facility: CLINIC | Age: 44
End: 2024-12-06

## 2024-12-06 NOTE — UTILIZATION REVIEW
NOTIFICATION OF ADMISSION DISCHARGE   This is a Notification of Discharge from [facility]. Please be advised that this patient has been discharge from our facility. Below you will find the admission and discharge date and time including the patient’s disposition.   UTILIZATION REVIEW CONTACT:  Maile Johnson  Utilization   Network Utilization Review Department  Phone: [phone] x carefully listen to the prompts. All voicemails are confidential.  Email: [email or web address]     ADMISSION INFORMATION  PRESENTATION DATE: 12/2/2024  2:32 PM  OBERVATION ADMISSION DATE: 12/02/2024 2033  INPATIENT ADMISSION DATE: 12/4/24  7:53 AM   DISCHARGE DATE: 12/5/2024  5:47 PM   DISPOSITION:Home/Self Care    Network Utilization Review Department  ATTENTION: Please call with any questions or concerns to [phone] and carefully listen to the prompts so that you are directed to the right person. All voicemails are confidential.   For Discharge needs, contact Care Management DC Support Team at [phone] opt. 2  Send all requests for admission clinical reviews, approved or denied determinations and any other requests to dedicated fax number below belonging to the campus where the patient is receiving treatment. List of dedicated fax numbers for the Facilities:  FACILITY NAME UR FAX NUMBER   ADMISSION DENIALS (Administrative/Medical Necessity) [phone]   DISCHARGE SUPPORT TEAM ([facility]) [phone]   PARENT CHILD HEALTH (Maternity/NICU/Pediatrics) [phone]   [facility] [phone]   [facility] [phone]   [facility] [phone]   [facility] [phone]   [facility] [phone]   [facility] [phone]   [facility] [phone]   [facility]  [phone]   [facility] [phone]   [facility] [phone]   [facility] [phone]   [facility] [phone]

## 2024-12-09 ENCOUNTER — OFFICE VISIT (OUTPATIENT)
Dept: OBGYN CLINIC | Facility: CLINIC | Age: 44
End: 2024-12-09
Payer: COMMERCIAL

## 2024-12-09 ENCOUNTER — TELEPHONE (OUTPATIENT)
Age: 44
End: 2024-12-09

## 2024-12-09 VITALS
DIASTOLIC BLOOD PRESSURE: 62 MMHG | SYSTOLIC BLOOD PRESSURE: 140 MMHG | BODY MASS INDEX: 44.55 KG/M2 | WEIGHT: 221 LBS | HEIGHT: 59 IN

## 2024-12-09 DIAGNOSIS — N85.2 ENLARGED UTERUS: Primary | ICD-10-CM

## 2024-12-09 PROCEDURE — 99214 OFFICE O/P EST MOD 30 MIN: CPT | Performed by: NURSE PRACTITIONER

## 2024-12-09 NOTE — PROGRESS NOTES
Assessment/Plan:  Here for ER f/u for enlarged uterus   Discussed adenomyosis vs fibroids and management of each   will check US for baseline  Aware periods will be irregular until thyroid back in normal limits  Call with concerns  F/u [place]          There are no diagnoses linked to this encounter.      Subjective:      Patient ID: Chen Peace is a 44 y.o. female.    Here for ER f/u  tubal for BC Presented to ER with  multiple syncopal episodes since . She had been having abd discomfort/ GERD, nausea, diarrhea, palpitations, 20 lb weightloss  Had colonoscopy on  normal CT head unremarkable. Small suspicion for PE. However TSH level undetectable. Unable to have contrast study with risk of causing thyroid storm. TSH < 0.0010 Had CT showed enlarged uterus with ? Adenomyosis or uterine fibroids She had followed with Dr Kan in the past and knows she has an enlarged uterus Per old records last US  and showed normal uterus and ovaries Last exam  slightly enlarged uterus She states up until a few months ago periods were monthly normal However past few months have been all over the place. LMP 2024  [place]         The following portions of the patient's history were reviewed and updated as appropriate: allergies, current medications, past family history, past medical history, past social history, past surgical history, and problem list.    Review of Systems   Constitutional:  Positive for unexpected weight change. Negative for fatigue.   Cardiovascular:  Positive for palpitations.   Gastrointestinal:  Positive for abdominal distention, diarrhea and nausea. Negative for abdominal pain and constipation.   Genitourinary:  Positive for menstrual problem. Negative for difficulty urinating, dyspareunia, dysuria, frequency, genital sores, pelvic pain, urgency, vaginal bleeding, vaginal discharge and vaginal pain.   Neurological:  Negative for headaches.  "  Psychiatric/Behavioral: Negative.  Negative for dysphoric mood. The patient is not nervous/anxious.          Objective:      /62 (BP Location: Left arm, Patient Position: Sitting, Cuff Size: Large)   Ht 4' 11\" (1.499 m)   Wt 100 kg (221 lb)   BMI 44.64 kg/m²          Physical Exam  Vitals and nursing note reviewed.   Constitutional:       General: She is not in acute distress.     Appearance: Normal appearance.   HENT:      Head: Normocephalic and atraumatic.   Pulmonary:      Effort: Pulmonary effort is normal.   Abdominal:      General: There is no distension.      Palpations: Abdomen is soft. There is no mass.      Tenderness: There is no abdominal tenderness.   Genitourinary:     General: Normal vulva.      Exam position: Lithotomy position.      Labia:         Right: No rash or lesion.         Left: No rash or lesion.       Vagina: Normal. No vaginal discharge or erythema.      Cervix: No cervical motion tenderness, discharge, lesion or cervical bleeding.      Uterus: Not enlarged and not tender.       Adnexa:         Right: No mass or tenderness.          Left: No mass or tenderness.        Rectum: Normal.   Lymphadenopathy:      Lower Body: No right inguinal adenopathy. No left inguinal adenopathy.   Skin:     General: Skin is warm and dry.   Neurological:      General: No focal deficit present.      Mental Status: She is alert and oriented to person, place, and time.   Psychiatric:         Mood and Affect: Mood normal.         Behavior: Behavior normal.         Thought Content: Thought content normal.         "

## 2024-12-10 ENCOUNTER — TELEPHONE (OUTPATIENT)
Dept: CARDIOLOGY CLINIC | Facility: CLINIC | Age: 44
End: 2024-12-10

## 2024-12-10 NOTE — TELEPHONE ENCOUNTER
AMB Event Recorder  ([id number]) and ([id number]):    No Authorization Required per Giana  (Benefits) on 12/10/2024 at 8:16am    Reference #[id number]

## 2024-12-12 ENCOUNTER — HOSPITAL ENCOUNTER (OUTPATIENT)
Dept: ULTRASOUND IMAGING | Facility: CLINIC | Age: 44
Discharge: HOME | End: 2024-12-12
Payer: COMMERCIAL

## 2024-12-12 DIAGNOSIS — R10.13 EPIGASTRIC PAIN: ICD-10-CM

## 2024-12-12 PROCEDURE — 76700 US EXAM ABDOM COMPLETE: CPT

## 2024-12-13 ENCOUNTER — OFFICE VISIT (OUTPATIENT)
Dept: ENDOCRINOLOGY | Facility: HOSPITAL | Age: 44
End: 2024-12-13
Payer: COMMERCIAL

## 2024-12-13 VITALS
WEIGHT: 221 LBS | SYSTOLIC BLOOD PRESSURE: 130 MMHG | DIASTOLIC BLOOD PRESSURE: 78 MMHG | HEART RATE: 88 BPM | OXYGEN SATURATION: 98 % | BODY MASS INDEX: 44.55 KG/M2 | HEIGHT: 59 IN

## 2024-12-13 DIAGNOSIS — E05.90 HYPERTHYROIDISM: Primary | ICD-10-CM

## 2024-12-13 DIAGNOSIS — E05.00 GRAVES DISEASE: ICD-10-CM

## 2024-12-13 PROCEDURE — 99215 OFFICE O/P EST HI 40 MIN: CPT | Performed by: INTERNAL MEDICINE

## 2024-12-13 RX ORDER — PROPRANOLOL HYDROCHLORIDE 120 MG/1
1 CAPSULE, EXTENDED RELEASE ORAL EVERY 12 HOURS
COMMUNITY
Start: 2024-12-06

## 2024-12-13 RX ORDER — METHIMAZOLE 10 MG/1
20 TABLET ORAL EVERY 12 HOURS SCHEDULED
Qty: 120 TABLET | Refills: 6 | Status: SHIPPED | OUTPATIENT
Start: 2024-12-13

## 2024-12-13 NOTE — PROGRESS NOTES
12/13/2024    Assessment & Plan      Diagnoses and all orders for this visit:    Hyperthyroidism  -     T3, free  -     T4, free; Future  -     T4, free  -     Comprehensive metabolic panel; Future  -     CBC and differential; Future  -     T3, free; Future  -     T4, free; Future  -     TSH, 3rd generation; Future  -     Comprehensive metabolic panel  -     CBC and differential  -     T3, free  -     T4, free  -     TSH, 3rd generation  -     methimazole (TAPAZOLE) 10 mg tablet; Take 2 tablets (20 mg total) by mouth every 12 (twelve) hours    Graves disease    Other orders  -     propranolol (INDERAL LA) 120 mg 24 hr capsule; 1 capsule Every 12 hours        Assessment & Plan  1. Hyperthyroidism due to Graves' disease.  She continues to exhibit symptoms of hyperthyroidism, including heart racing, palpitations, headaches, dizziness, hand tremors, shortness of breath, fatigue, and weakness. These symptoms are less severe than those experienced during her hospitalization for thyroid storm. She is currently on high-dose propranolol (120 mg twice a day) and methimazole (2 pills twice a day). A repeat free T4, and free T3 will be conducted at her earliest convenience to assess the effectiveness of the current treatment. She has been advised to avoid strenuous exercise and to refrain from work for at least 6 to 8 weeks, as recommended by her primary care physician. She is permitted to drive short distances if she feels capable but should avoid long-distance driving. If these levels have not improved, an increase in her methimazole dosage may be necessary. Surgical intervention is likely to be the definitive treatment, given the risks associated with discontinuing antithyroid medications for radioactive iodine treatment and the potential for future thyroid storms, even if she achieves remission on methimazole. It was emphasized that surgery can not be performed until her thyroid is adequately controlled to prevent a thyroid  storm during the procedure.    2. Graves' disease.  She does not exhibit any signs of Graves' ophthalmopathy. She is scheduled to see an ophthalmologist in the near future.    Follow-up  The patient will follow up in 6 weeks with preceding CMP, CBC, TSH, free T4, and free T3.    I have spent a total time of 40 minutes in caring for this patient on the day of the visit/encounter including Diagnostic results, Prognosis, Risks and benefits of tx options, Instructions for management, Patient and family education, Importance of tx compliance, Risk factor reductions, Impressions, Counseling / Coordination of care, Documenting in the medical record, Reviewing / ordering tests, medicine, procedures  , and Obtaining or reviewing history  .      CC: Hyperthyroid, [facility] follow-up    History of Present Illness    HPI: Chen Peace is a 44-year-old female here for a follow-up visit regarding her hyperthyroidism due to Graves' disease. She is a hospital follow-up as she was seen in the hospital 1 week ago for severe thyrotoxicosis from Graves' disease with thyroid storm.  She is here for hospital follow-up.    She reports experiencing tachycardia and palpitations, which led to an adjustment in her blood pressure medication by her primary care physician. Her propranolol dosage was modified from 160 mg once daily to 120 mg twice daily. She continues to take methimazole 10 mg, 2 tablets twice daily. Over the past week, she has experienced headaches and vertigo, although these symptoms have lessened this week. However, she has noticed a recurrence of hand tremors and dyspnea, particularly when ascending or descending stairs. She also reports fatigue and generalized weakness. She has been experiencing alternating diarrhea and constipation. She does not experience excessive sweating or cold intolerance. She has been suffering from insomnia for the past 3 months, managing only 4 hours of sleep last night and attempting to  compensate with daytime naps. She does not report any skin dryness, pruritus, or rashes. She has observed increased nail fragility and ridging, and mild hair loss, despite having thick hair. She reports no anxiety or depression but admits to feeling slightly sad. She occasionally experiences sensitivity and was informed by Dr. Noble that her thyroid appeared inflamed. She does not experience diplopia but reports occasional blurred vision. She has not undergone any recent blood work. She continues to take iron and vitamin D supplements. She consulted with her gastroenterologist, Dr. Padilla, on Friday, who suggested that her gastrointestinal issues may be related to her thyroid condition. He plans to adjust her medications accordingly. She has been diagnosed with gallstones and fibroids and is suspected to have adenomyosis. Her OB-GYN expressed concern about differentiating between symptoms of Graves' disease, thyroid issues, and early menopause.        Historical Information   Past Medical History:   Diagnosis Date    Anemia     Anxiety and depression     Fibroids     Gallstones     Hyperlipidemia     Hypertension     Hyperthyroidism determined by thyroid function test     Menorrhagia with regular cycle     Obesity     Reactive airway disease     Vitamin D deficiency      Past Surgical History:   Procedure Laterality Date     SECTION      X2    MAMMO (HISTORICAL)  2020    TUBAL LIGATION      pt had embedded IUD removed and tubal ligation performed     Social History   Social History     Substance and Sexual Activity   Alcohol Use Yes    Comment: occasional     Social History     Substance and Sexual Activity   Drug Use Never     Social History     Tobacco Use   Smoking Status Never    Passive exposure: Never   Smokeless Tobacco Never     Family History:   Family History   Problem Relation Age of Onset    Hyperthyroidism Mother     Thyroid disease unspecified Mother         thyroidectomy for  hyperthyroidism    Early menopause Mother     Endometriosis Mother     Hypertension Mother     Hyperlipidemia Mother     Breast cancer Mother     Skin cancer Father     Stroke Father     Coronary artery disease Father     Hypertension Father     Hyperlipidemia Father     Hypertension Maternal Aunt     Hypertension Maternal Uncle     Thyroid disease unspecified Paternal Aunt         thyroidectomy    Hypertension Paternal Aunt     Hypertension Paternal Uncle     Hypothyroidism Maternal Grandmother     Colon cancer Maternal Grandmother     Hypertension Maternal Grandmother     Hypertension Maternal Grandfather     Hypertension Paternal Grandmother     Hypertension Paternal Grandfather     Thyroid disease unspecified Other     Thyroid disease unspecified Cousin         paternal cousin with thyroidectomy    Hypothyroidism Cousin         maternal cousin with hypothyroidism    No Known Problems Son     No Known Problems Daughter     Ovarian cancer Neg Hx     Uterine cancer Neg Hx        Meds/Allergies   Current Outpatient Medications   Medication Sig Dispense Refill    albuterol (VENTOLIN HFA) 90 mcg/act inhaler Inhale 2 puffs every 6 (six) hours as needed for wheezing 18 g 0    co-enzyme Q-10 50 MG capsule Take 100 mg by mouth daily        ergocalciferol (VITAMIN D2) 50,000 units Take 50,000 Units by mouth once a week Last taken 12/1      ferrous sulfate 325 (65 FE) MG EC tablet Take 325 mg by mouth 3 (three) times a day with meals      methimazole (TAPAZOLE) 10 mg tablet Take 2 tablets (20 mg total) by mouth every 12 (twelve) hours 120 tablet 6    Pepcid 40 MG tablet 40 mg daily at bedtime      pravastatin (PRAVACHOL) 20 mg tablet Take 1 tablet by mouth in the morning Takes in evening      propranolol (INDERAL LA) 120 mg 24 hr capsule 1 capsule Every 12 hours      RABEprazole (ACIPHEX) 20 MG tablet Take 1 tablet (20 mg total) by mouth daily 30 tablet 2    saccharomyces boulardii (FLORASTOR) 250 mg capsule Take 250 mg by  "mouth in the morning       No current facility-administered medications for this visit.     Allergies   Allergen Reactions    Sulfa Antibiotics Anaphylaxis    Sulfamethoxazole-Trimethoprim Anaphylaxis    Mupirocin Hives     Other Reaction(s): swelling lips, flushed cheeks       Objective   Vitals: Blood pressure 130/78, pulse 88, height 4' 11\" (1.499 m), weight 100 kg (221 lb), last menstrual period 11/17/2024, SpO2 98%, not currently breastfeeding.  Invasive Devices       None                   Physical Exam    No lid lag, stare, proptosis, or periorbital edema in the eyes.  Thyroid remains enlarged and slightly tender and firm to palpation. No lymphadenopathy. No bruits over the thyroid gland today.  Lungs are clear to auscultation.  Heart has a regular rate and rhythm. No murmurs.  There is a slight tremor of the outstretched hands. Patellar deep tendon reflexes are normal.      The history was obtained from the review of the chart and from the patient.    Lab Results:      Blood work performed on 12/2/2024 in hospital showed a TSH of less than 0.01 with a free T4 of 3.95 and a T3 total of 3.1.    Thyrotropin receptor antibodies are positive at 3.9 and thyroid-stimulating immunoglobulins are positive at 2.36.    Lab Results   Component Value Date    CREATININE 0.52 (L) 12/05/2024    CREATININE 0.45 (L) 12/04/2024    CREATININE 0.48 (L) 12/03/2024    BUN 15 12/05/2024     11/26/2015    K 4.0 12/05/2024     (H) 12/05/2024    CO2 24 12/05/2024     eGFR   Date Value Ref Range Status   12/05/2024 114 ml/min/1.73sq m Final         Lab Results   Component Value Date    ALT 18 12/05/2024    AST 17 12/05/2024    ALKPHOS 40 12/05/2024    BILITOT 0.61 11/26/2015       Lab Results   Component Value Date    FREET4 3.95 (H) 12/02/2024             Future Appointments   Date Time Provider Department Center   12/23/2024 10:00 AM PEDRO [facility]   2/10/2025  8:00 AM [place] Padilla,  " [facility]   3/11/2025  3:00 PM MD BRAYAN [facility]

## 2024-12-13 NOTE — PATIENT INSTRUCTIONS
Let's get blood work now.     Continue the same methimazole.     Take it easy, no strenuous exercise.     No work for now.     Driving if feels ok and short distance.       Blood work in 6 weeks with follow up visit.

## 2024-12-15 NOTE — PATIENT INSTRUCTIONS
Here for ER f/u for enlarged uterus   Discussed adenomyosis vs fibroids and management of each   will check US for baseline  Aware periods will be irregular until thyroid back in normal limits  Call with concerns  F/u [place]

## 2024-12-16 ENCOUNTER — TELEPHONE (OUTPATIENT)
Age: 44
End: 2024-12-16

## 2024-12-16 NOTE — TELEPHONE ENCOUNTER
Patient called in stating her eye doctor would like notes from visit with patient and Dr Judd.    I provided fax number to patient and she states she will have the doctor fax a records request.     Patient states the name of  the office is [facility] - Dr Miroslava Loaiza.

## 2024-12-17 LAB
T3FREE SERPL-MCNC: 6.1 PG/ML (ref 2–4.4)
T4 FREE SERPL-MCNC: 2.29 NG/DL (ref 0.82–1.77)

## 2024-12-18 ENCOUNTER — RESULTS FOLLOW-UP (OUTPATIENT)
Dept: ENDOCRINOLOGY | Facility: HOSPITAL | Age: 44
End: 2024-12-18

## 2024-12-19 ENCOUNTER — RESULTS FOLLOW-UP (OUTPATIENT)
Dept: GASTROENTEROLOGY | Facility: CLINIC | Age: 44
End: 2024-12-19

## 2024-12-23 ENCOUNTER — OFFICE VISIT (OUTPATIENT)
Dept: CARDIOLOGY CLINIC | Facility: CLINIC | Age: 44
End: 2024-12-23
Payer: COMMERCIAL

## 2024-12-23 ENCOUNTER — HOSPITAL ENCOUNTER (OUTPATIENT)
Dept: RADIOLOGY | Facility: HOSPITAL | Age: 44
Discharge: HOME | End: 2024-12-23
Payer: COMMERCIAL

## 2024-12-23 VITALS
DIASTOLIC BLOOD PRESSURE: 91 MMHG | WEIGHT: 224 LBS | SYSTOLIC BLOOD PRESSURE: 141 MMHG | BODY MASS INDEX: 45.16 KG/M2 | HEIGHT: 59 IN | HEART RATE: 72 BPM

## 2024-12-23 DIAGNOSIS — R00.0 TACHYCARDIA: ICD-10-CM

## 2024-12-23 DIAGNOSIS — E78.5 HYPERLIPIDEMIA, UNSPECIFIED HYPERLIPIDEMIA TYPE: ICD-10-CM

## 2024-12-23 DIAGNOSIS — E05.00 GRAVES DISEASE: Primary | ICD-10-CM

## 2024-12-23 DIAGNOSIS — R55 SYNCOPE, UNSPECIFIED SYNCOPE TYPE: ICD-10-CM

## 2024-12-23 DIAGNOSIS — I10 ESSENTIAL HYPERTENSION: ICD-10-CM

## 2024-12-23 PROCEDURE — 71046 X-RAY EXAM CHEST 2 VIEWS: CPT

## 2024-12-23 PROCEDURE — 99214 OFFICE O/P EST MOD 30 MIN: CPT

## 2024-12-23 RX ORDER — PROPRANOLOL HYDROCHLORIDE 160 MG/1
160 CAPSULE, EXTENDED RELEASE ORAL 2 TIMES DAILY
Qty: 60 CAPSULE | Refills: 1 | Status: SHIPPED | OUTPATIENT
Start: 2024-12-23

## 2024-12-23 NOTE — PROGRESS NOTES
"                                           Cardiology Follow Up    Chen Peace  1980  [id number]  [facility] [place]  [address]  [facility] [address]  [facility] [id number] [phone]    1. Graves disease  propranolol (INDERAL LA) 160 mg      2. Syncope, unspecified syncope type        3. Tachycardia  XR chest pa and lateral      4. Essential hypertension        5. Hyperlipidemia, unspecified hyperlipidemia type            Discussion/Summary:  1.) Graves' disease: Diagnosed after presenting to the hospital with syncopal episodes, thyroid level was undetectable at that time.  Patient follows with endocrinology outpatient last visit 12/13/2024. Currently prescribed Methimazole 10 mg twice daily, propranolol 120 mg twice daily.  Ultimately patient will most likely require thyroid surgery, once her thyroid is stabilized.    2.) Syncope: Patient had 2 syncopal episodes prior to her hospitalization, in the setting of thyroid storm.  TTE 12/3/2024: EF 65 to 70%, concentric left ventricular remodeling, otherwise normal. 2-week ZIO ordered.  Patient without further syncopal episodes since prior to her hospitalization.  Does report some lightheadedness and dizziness at times.  Zio patch currently on patient. I will follow-up with results once obtained    3) Tachycardia/palpitations: Patient  palpitations, heart racing.  Patient also reports nocturnal dyspnea patient's propranolol was increased to 120 mg every 12 hours earlier this month.  Patient reports feeling more palpitations, heart racing, states she feels that her symptoms are \"creeping up\" will increase propranolol to 160 mg twice daily.  Patient also with complaints of nocturnal dyspnea, states she typically gets out of bed with this increasing shortness of breath,  and symptoms will greg.  Does not examine volume overloaded, however reports a 5 pound weight gain since her  last outpatient visit.  Will check chest " x-ray.    4.)  Hypertension: Patient with history of hypertension, previously on atenolol 50 daily.  With recent hospitalization and diagnosis of Graves' disease with thyroid storm patient switched to propranolol.  Patient a little hypertensive today in the office 140/91, will increase propranolol for symptoms of tachycardia and palpitations.    5.)  Hyperlipidemia: Currently treated with pravastatin 20 mg daily.  Last lipid profile, 11/26/2024: Total cholesterol 132, triglycerides 129, HDL 35, LDL 74.  Would not make any recommendations at this time.    Patient will follow-up in 1 month, or sooner if necessary.    Interval History:   Chen Peace is a 44 y.o. female, with a past medical history of hyperlipidemia, hypertension, GERD recently hospitalized and diagnosed with Graves' disease, presented in thyroid storm after multiple syncopal episodes.    Since her hospitalization patient has been following up with her primary care, OB/GYN and endocrinology.  Her last visit to endocrinology was on 12/13/2024, patient states that this visit plan was to get her symptoms under control and then have an eventual thyroidectomy.  Patient is a , has been taken out of work until at least February.      Patient currently has a Zio patch on and patient states that her symptoms of tachycardia, palpitations seem to be increasing recently.  She saw her primary care on 3 December and her propranolol was increased 220 mg twice daily at that time.      Patient also reports paroxysmal nocturnal dyspnea, she typically will get out of bed and her symptoms will greg, but she reports very poor sleep secondary to this.    Medical Problems       Problem List       Gastroesophageal reflux disease    BMI 45.0-49.9, adult (HCC)    Low TSH level    Tachycardia    Syncope    Essential hypertension    Morbid obesity (HCC)    Hyperthyroidism    Graves disease        Past Medical History:   Diagnosis Date    Anemia     Anxiety  and depression     Fibroids     Gallstones     Hyperlipidemia     Hypertension     Hyperthyroidism determined by thyroid function test     Menorrhagia with regular cycle     Obesity     Reactive airway disease     Vitamin D deficiency      Social History     Socioeconomic History    Marital status: /Civil Union     Spouse name: Not on file    Number of children: Not on file    Years of education: Not on file    Highest education level: Not on file   Occupational History    Not on file   Tobacco Use    Smoking status: Never     Passive exposure: Never    Smokeless tobacco: Never   Vaping Use    Vaping status: Never Used   Substance and Sexual Activity    Alcohol use: Yes     Comment: occasional    Drug use: Never    Sexual activity: Yes     Partners: Male     Birth control/protection: Female Sterilization   Other Topics Concern    Not on file   Social History Narrative    Not on file     Social Drivers of Health     Financial Resource Strain: Not on file   Food Insecurity: No Food Insecurity (12/2/2024)    Nursing - Inadequate Food Risk Classification     Worried About Running Out of Food in the Last Year: Not on file     Ran Out of Food in the Last Year: Not on file     Ran Out of Food in the Last Year: 1   Transportation Needs: No Transportation Needs (12/2/2024)    Nursing - Transportation Risk Classification     Lack of Transportation: Not on file     Lack of Transportation: 2   Physical Activity: Not on file   Stress: Not on file   Social Connections: Not on file   Intimate Partner Violence: Unknown (12/2/2024)    Nursing IPS     Feels Physically and Emotionally Safe: Not on file     Physically Hurt by Someone: Not on file     Humiliated or Emotionally Abused by Someone: Not on file     Physically Hurt by Someone: 2     Hurt or Threatened by Someone: 2   Housing Stability: Unknown (12/2/2024)    Nursing: Inadequate Housing Risk Classification     Has Housing: Not on file     Worried About Losing Housing:  Not on file     Unable to Get Utilities: Not on file     Unable to Pay for Housing in the Last Year: 2     Has Housin      Family History   Problem Relation Age of Onset    Hyperthyroidism Mother     Thyroid disease unspecified Mother         thyroidectomy for hyperthyroidism    Early menopause Mother     Endometriosis Mother     Hypertension Mother     Hyperlipidemia Mother     Breast cancer Mother     Skin cancer Father     Stroke Father     Coronary artery disease Father     Hypertension Father     Hyperlipidemia Father     Hypertension Maternal Aunt     Hypertension Maternal Uncle     Thyroid disease unspecified Paternal Aunt         thyroidectomy    Hypertension Paternal Aunt     Hypertension Paternal Uncle     Hypothyroidism Maternal Grandmother     Colon cancer Maternal Grandmother     Hypertension Maternal Grandmother     Hypertension Maternal Grandfather     Hypertension Paternal Grandmother     Hypertension Paternal Grandfather     Thyroid disease unspecified Other     Thyroid disease unspecified Cousin         paternal cousin with thyroidectomy    Hypothyroidism Cousin         maternal cousin with hypothyroidism    No Known Problems Son     No Known Problems Daughter     Ovarian cancer Neg Hx     Uterine cancer Neg Hx      Past Surgical History:   Procedure Laterality Date     SECTION      X2    MAMMO (HISTORICAL)  2020    TUBAL LIGATION      pt had embedded IUD removed and tubal ligation performed       Current Outpatient Medications:     albuterol (VENTOLIN HFA) 90 mcg/act inhaler, Inhale 2 puffs every 6 (six) hours as needed for wheezing, Disp: 18 g, Rfl: 0    co-enzyme Q-10 50 MG capsule, Take 100 mg by mouth daily  , Disp: , Rfl:     ferrous sulfate 325 (65 FE) MG EC tablet, Take 325 mg by mouth 3 (three) times a day with meals, Disp: , Rfl:     methimazole (TAPAZOLE) 10 mg tablet, Take 2 tablets (20 mg total) by mouth every 12 (twelve) hours, Disp: 120 tablet, Rfl: 6    Pepcid 40  MG tablet, 40 mg daily at bedtime, Disp: , Rfl:     pravastatin (PRAVACHOL) 20 mg tablet, Take 1 tablet by mouth in the morning Takes in evening, Disp: , Rfl:     propranolol (INDERAL LA) 160 mg, Take 1 capsule (160 mg total) by mouth 2 (two) times a day, Disp: 60 capsule, Rfl: 1    RABEprazole (ACIPHEX) 20 MG tablet, Take 1 tablet (20 mg total) by mouth daily, Disp: 30 tablet, Rfl: 2    saccharomyces boulardii (FLORASTOR) 250 mg capsule, Take 250 mg by mouth in the morning, Disp: , Rfl:     ergocalciferol (VITAMIN D2) 50,000 units, Take 50,000 Units by mouth once a week Last taken 12/1, Disp: , Rfl:   Allergies   Allergen Reactions    Sulfa Antibiotics Anaphylaxis    Sulfamethoxazole-Trimethoprim Anaphylaxis    Mupirocin Hives     Other Reaction(s): swelling lips, flushed cheeks       Labs:     Chemistry        Component Value Date/Time     11/26/2015 1400    K 4.0 12/05/2024 0527    K 6.3 (HH) 11/26/2015 1400     (H) 12/05/2024 0527     11/26/2015 1400    CO2 24 12/05/2024 0527    CO2 24.7 11/26/2015 1400    BUN 15 12/05/2024 0527    BUN 8 11/26/2015 1400    CREATININE 0.52 (L) 12/05/2024 0527    CREATININE 0.16 (L) 11/26/2015 1400        Component Value Date/Time    CALCIUM 8.9 12/05/2024 0527    CALCIUM 9.9 11/26/2015 1400    ALKPHOS 40 12/05/2024 0527    ALKPHOS 64 11/26/2015 1400    AST 17 12/05/2024 0527    AST 32 11/26/2015 1400    ALT 18 12/05/2024 0527    ALT 29 11/26/2015 1400    BILITOT 0.61 11/26/2015 1400        Imaging: US abdomen complete  Result Date: 12/17/2024  Narrative: ABDOMEN ULTRASOUND, COMPLETE INDICATION: R10.13: Epigastric pain. COMPARISON: CT chest abdomen pelvis 12/2/2024 TECHNIQUE: Real-time ultrasound of the abdomen was performed with a curvilinear transducer with both volumetric sweeps and still imaging techniques. FINDINGS: PANCREAS: Visualized portions of the pancreas are within normal limits. AORTA AND IVC: Visualized portions are normal for patient age. LIVER:  Size: Within normal range. The liver measures 13.0 cm in the midclavicular line. Contour: Surface contour is smooth. Parenchyma: Echogenicity and echotexture are within normal limits. No liver mass identified. Limited imaging of the main portal vein shows it to be patent and hepatopetal. BILIARY: The gallbladder is normal in caliber. No wall thickening or pericholecystic fluid. Shadowing gallstone(s) identified. No sonographic Squires's sign. No intrahepatic biliary dilatation. CBD measures 2.0 mm. No choledocholithiasis. KIDNEY: Right kidney measures 10.5 x 4.6 x 4.8 cm. Volume 122.1 mL Kidney within normal limits. Left kidney measures 10.3 x 4.6 x 4.2 cm. Volume 105.4 mL Kidney within normal limits. SPLEEN: Measures 10.9 x 11.2 x 5.3 cm. Volume 337.2 mL Within normal limits. ASCITES: None.     Impression: Cholelithiasis. No evidence of acute cholecystitis. Workstation performed: [id number]     Colonoscopy  Addendum Date: 12/6/2024  Addendum: [facility] Endoscopy [address] [facility] [address] [phone] DATE OF SERVICE: 11/27/24 PHYSICIAN(S): Attending: Chico Padilla DO Fellow: No Staff Documented INDICATION: Screening for colon cancer POST-OP DIAGNOSIS: See the impression below. HISTORY: Prior colonoscopy: No prior colonoscopy. BOWEL PREPARATION: Clenpiq PREPROCEDURE: Informed consent was obtained for the procedure, including sedation. Risks including but not limited to bleeding, infection, perforation, adverse drug reaction and aspiration were explained in detail. Also explained about less than 100% sensitivity with the exam and other alternatives. The patient was placed in the left lateral decubitus position. Procedure: Colonoscopy DETAILS OF PROCEDURE: Patient was taken to the procedure room where a time out was performed to confirm correct patient and correct procedure. The patient underwent monitored anesthesia care, which was administered by an anesthesia  professional. The patient's blood pressure, heart rate, oxygen and respirations were monitored throughout the procedure. A digital rectal exam was performed. The scope was introduced through the anus and advanced to the terminal ileum. Retroflexion was performed in the rectum. The quality of bowel preparation was evaluated using the Magee Bowel Preparation Scale with scores of: right colon = 3, transverse colon = 3, left colon = 3. The total BBPS score was 9. Bowel prep was adequate. The patient's estimated blood loss was minimal (<5 mL). The procedure was not difficult. The patient tolerated the procedure well. There were no apparent adverse events. ANESTHESIA INFORMATION: ASA: III Anesthesia Type: IV Sedation with Anesthesia MEDICATIONS: No administrations occurring from 1325 to 1358 on 11/27/24 FINDINGS: One sessile and benign-appearing polyp measuring smaller than 5 mm in the ascending colon; performed cold snare with complete en bloc removal and retrieved specimen One sessile and benign-appearing polyp measuring smaller than 5 mm in the transverse colon; performed cold forceps biopsy with complete en bloc removal Internal small, protruding hemorrhoids observed during retroflexion; no bleeding was observed EVENTS: Procedure Events Event Event Time ENDO SCOPE OUT TIME 11/27/2024  1:42 PM ENDO CECUM REACHED 11/27/2024  1:47 PM ENDO SCOPE OUT TIME 11/27/2024  1:56 PM SPECIMENS: ID Type Source Tests Collected by Time Destination 1 : r/o celiac Tissue Duodenum TISSUE EXAM Chico Lund Randy, DO 11/27/2024  1:40 PM  2 : r/o H.Pylori Tissue Stomach TISSUE EXAM Chico Lund Randy, DO 11/27/2024  1:40 PM  3 : polyp Tissue Large Intestine, Right/Ascending Colon TISSUE EXAM Chicoelijah Lund Randy, DO 11/27/2024  1:49 PM  4 : polyp Tissue Large Intestine, Transverse Colon TISSUE EXAM Chico Lund Randy, DO 11/27/2024  1:53 PM  EQUIPMENT: Colonoscope -[id number] IMPRESSION: 2 subcentimeter polyps Small, protruding hemorrhoids  RECOMMENDATION: Await pathology results Repeat colonoscopy in 5 years, due: 11/26/2029 Personal history of colon polyps  Your colon and small bowel (terminal ileum) appeared healthy. We removed two small polyps which were benign in appearance. We will contact you with the results in 1-2 weeks. 12/06: Polyp with early serrated change, recall changed to 5 years    Chico Padilla DO     Result Date: 12/6/2024  Narrative: Table formatting from the original result was not included. [facility] Endoscopy [address] [facility] [id number] [phone] DATE OF SERVICE: 11/27/24 PHYSICIAN(S): Attending: Chico Padilla DO Fellow: No Staff Documented INDICATION: Screening for colon cancer POST-OP DIAGNOSIS: See the impression below. HISTORY: Prior colonoscopy: No prior colonoscopy. BOWEL PREPARATION: Clenpiq PREPROCEDURE: Informed consent was obtained for the procedure, including sedation. Risks including but not limited to bleeding, infection, perforation, adverse drug reaction and aspiration were explained in detail. Also explained about less than 100% sensitivity with the exam and other alternatives. The patient was placed in the left lateral decubitus position. Procedure: Colonoscopy DETAILS OF PROCEDURE: Patient was taken to the procedure room where a time out was performed to confirm correct patient and correct procedure. The patient underwent monitored anesthesia care, which was administered by an anesthesia professional. The patient's blood pressure, heart rate, oxygen and respirations were monitored throughout the procedure. A digital rectal exam was performed. The scope was introduced through the anus and advanced to the terminal ileum. Retroflexion was performed in the rectum. The quality of bowel preparation was evaluated using the Emmons Bowel Preparation Scale with scores of: right colon = 3, transverse colon = 3, left colon = 3. The total BBPS score was 9. Bowel prep was  adequate. The patient's estimated blood loss was minimal (<5 mL). The procedure was not difficult. The patient tolerated the procedure well. There were no apparent adverse events. ANESTHESIA INFORMATION: ASA: III Anesthesia Type: IV Sedation with Anesthesia MEDICATIONS: No administrations occurring from 1325 to 1358 on 11/27/24 FINDINGS: One sessile and benign-appearing polyp measuring smaller than 5 mm in the ascending colon; performed cold snare with complete en bloc removal and retrieved specimen One sessile and benign-appearing polyp measuring smaller than 5 mm in the transverse colon; performed cold forceps biopsy with complete en bloc removal Internal small, protruding hemorrhoids observed during retroflexion; no bleeding was observed EVENTS: Procedure Events Event Event Time ENDO SCOPE OUT TIME 11/27/2024  1:42 PM ENDO CECUM REACHED 11/27/2024  1:47 PM ENDO SCOPE OUT TIME 11/27/2024  1:56 PM SPECIMENS: ID Type Source Tests Collected by Time Destination 1 : r/o celiac Tissue Duodenum TISSUE EXAM Chico Padilla DO 11/27/2024  1:40 PM  2 : r/o H.Pylori Tissue Stomach TISSUE EXAM Chico Padilla, DO 11/27/2024  1:40 PM  3 : polyp Tissue Large Intestine, Right/Ascending Colon TISSUE EXAM Chico Padilla, DO 11/27/2024  1:49 PM  4 : polyp Tissue Large Intestine, Transverse Colon TISSUE EXAM Chico Padilla,  11/27/2024  1:53 PM  EQUIPMENT: Colonoscope -[id number]     Impression: 2 subcentimeter polyps Small, protruding hemorrhoids RECOMMENDATION: Repeat colonoscopy in 7 years, due: 11/26/2031 Personal history of colon polyps  Await pathology results Your colon and small bowel (terminal ileum) appeared healthy. We removed two small polyps which were benign in appearance. We will contact you with the results in 1-2 weeks. Due to the finding and removal of two small polyps, we recommend repeating your colonoscopy in 7 years.    Chico Padilla DO     Echo complete w/ contrast if indicated  Result  Date: 12/3/2024  Narrative:   Left Ventricle: Left ventricular cavity size is normal. Wall thickness is mildly increased. There is concentric remodeling. The left ventricular ejection fraction is 65-70%. Systolic function is vigorous. Wall motion is normal.   Right Ventricle: Right ventricular cavity size is normal. Systolic function is normal.   Left Atrium: The atrium is normal in size.   Right Atrium: The atrium is normal in size.     NM lung ventilation / perfusion  Result Date: 12/3/2024  Narrative: VENTILATION AND PERFUSION SCAN INDICATION: Tachycardia, syncope, recent surgery COMPARISON: Chest radiograph and chest CT 12/2/24 TECHNIQUE:  Posterior ventilation imaging was performed after the inhalation of 6.6 mCi Xe-133 gas.  Multiplanar perfusion imaging was next performed following the intravenous administration of 4.0 mCi Tc-99m labeled MAA. FINDINGS: Ventilation imaging demonstrates normal distribution of radiopharmaceutical throughout both lungs. Perfusion imaging demonstrates homogeneous distribution of the radiopharmaceutical throughout both lungs. There is no segmental or subsegmental defect.     Impression: Normal exam. Resident: SYMONE RAMIREZ I, the attending radiologist, have reviewed the images and agree with the final report above. Workstation performed: [id number]     CT chest abdomen pelvis wo contrast  Result Date: 12/2/2024  Narrative: CT CHEST, ABDOMEN AND PELVIS WITHOUT IV CONTRAST INDICATION: syncope, high risk wells,. COMPARISON: None. TECHNIQUE: CT examination of the chest, abdomen and pelvis was performed without intravenous contrast. Multiplanar 2D reformatted images were created from the source data. This examination, like all CT scans performed in the [facility], was performed utilizing techniques to minimize radiation dose exposure, including the use of iterative reconstruction and automated exposure control. Radiation dose length product (DLP) for this visit:  1505 mGy-cm Enteric Contrast: Not administered. FINDINGS: CHEST LUNGS: Lungs are clear. No tracheal or endobronchial lesion. PLEURA: Unremarkable. HEART/GREAT VESSELS: Large amount of coronary calcification in the left anterior descending, left circumflex and right coronary arteries. No thoracic aortic aneurysm. MEDIASTINUM AND CINDY: Unremarkable. CHEST WALL AND LOWER NECK: Unremarkable. ABDOMEN LIVER/BILIARY TREE: Unremarkable. GALLBLADDER: 9 mm gallstone in the gallbladder. No gallbladder wall thickening or para cholecystic fluid. SPLEEN: Unremarkable. PANCREAS: Unremarkable. ADRENAL GLANDS: 7 mm low-attenuation focus in the medial limb of the left adrenal gland on image 102 of series 2. No specific imaging follow-up necessary. The right adrenal gland is normal. KIDNEYS/URETERS: Unremarkable. No hydronephrosis. STOMACH AND BOWEL: Unremarkable. APPENDIX: No findings to suggest appendicitis. The appendix is normal. ABDOMINOPELVIC CAVITY: No ascites. No pneumoperitoneum. No lymphadenopathy. Visceral abdominal obesity. VESSELS: Unremarkable for patient's age. PELVIS REPRODUCTIVE ORGANS: Relative enlargement of the uterus that measures 6.1 cm in transverse dimension. This may indicate uterine adenomyosis or uterine fibroids. URINARY BLADDER: Unremarkable. ABDOMINAL WALL/INGUINAL REGIONS: Unremarkable. BONES: No acute fracture or suspicious osseous lesion. There is osseous fusion of the sternomanubrial joint. Mild degenerative change of the left sternoclavicular joint. Sclerosis along the medial left clavicle probably related osseous remodeling from degenerative change.     Impression: Prominent coronary artery calcification for age. Coronary stenosis is possible. Visceral abdominal obesity. Relative enlargement of the uterus that may indicate uterine adenomyosis or fibroids. Cholelithiasis without evidence of acute cholecystitis. No acute traumatic injury of the chest, abdomen or pelvis. Workstation performed:  [id number]     CT spine cervical without contrast  Result Date: 12/2/2024  Narrative: CT CERVICAL SPINE - WITHOUT CONTRAST INDICATION:   loc with syncope. COMPARISON:  None. TECHNIQUE:  CT examination of the cervical spine was performed without intravenous contrast.  Contiguous axial images were obtained. Multiplanar 2D reformatted images were created from the source data. Radiation dose length product (DLP) for this visit:  366 mGy-cm .  This examination, like all CT scans performed in the [facility], was performed utilizing techniques to minimize radiation dose exposure, including the use of iterative reconstruction and automated exposure control. IMAGE QUALITY:  Diagnostic. FINDINGS: ALIGNMENT:  Normal alignment of the cervical spine. VERTEBRAE:  No acute fracture. DEGENERATIVE CHANGES:  Spondylotic changes without acute critical spinal canal stenosis. PREVERTEBRAL AND PARASPINAL SOFT TISSUES: No acute abnormality. THORACIC INLET:  No acute abnormality.     Impression: No acute cervical spine fracture or traumatic malalignment. Workstation performed: [id number]     CT head without contrast  Result Date: 12/2/2024  Narrative: CT BRAIN - WITHOUT CONTRAST INDICATION:   loc with syncope. COMPARISON: MRI brain 1/4/2007. TECHNIQUE:  CT examination of the brain was performed.  Multiplanar 2D reformatted images were created from the source data. Radiation dose length product (DLP) for this visit:  862 mGy-cm .  This examination, like all CT scans performed in the [facility], was performed utilizing techniques to minimize radiation dose exposure, including the use of iterative reconstruction and automated exposure control. IMAGE QUALITY:  Motion degraded which reduces diagnostic sensitivity. FINDINGS: PARENCHYMA:No acute intracranial hemorrhage, mass effect or midline shift. VENTRICLES AND EXTRA-AXIAL SPACES:  Normal for the patient's age. VISUALIZED ORBITS: No acute abnormality. PARANASAL  SINUSES: Mild mucosal thickening of the visualized paranasal sinuses. CALVARIUM AND EXTRACRANIAL SOFT TISSUES: No acute abnormality.     Impression: Within the confines of a motion degraded study, no acute intracranial hemorrhage, mass effect or midline shift. Workstation performed: [id number]     X-ray chest 1 view portable  Result Date: 12/2/2024  Narrative: XR CHEST PORTABLE INDICATION: chest pain. COMPARISON: November 27, 2018 and March 30, 2006 FINDINGS: Clear lungs. No pneumothorax or pleural effusion. Normal cardiomediastinal silhouette. Bones are unremarkable for age. Normal upper abdomen.     Impression: No acute cardiopulmonary disease. Workstation performed: [id number]     EGD  Result Date: 11/27/2024  Narrative: Table formatting from the original result was not included. [facility] [address] [facility] [id number] [phone] DATE OF SERVICE: 11/27/24 PHYSICIAN(S): Attending: [place] Padilla DO Fellow: No Staff Documented INDICATION: Gastroesophageal reflux disease, unspecified whether esophagitis present POST-OP DIAGNOSIS: See the impression below. PREPROCEDURE: Informed consent was obtained for the procedure, including sedation.  Risks of perforation, hemorrhage, adverse drug reaction and aspiration were discussed. The patient was placed in the left lateral decubitus position. Patient was explained about the risks and benefits of the procedure. Risks including but not limited to bleeding, infection, and perforation were explained in detail. Also explained about less than 100% sensitivity with the exam and other alternatives. PROCEDURE: EGD DETAILS OF PROCEDURE: Patient was taken to the procedure room where a time out was performed to confirm correct patient and correct procedure. The patient underwent monitored anesthesia care, which was administered by an anesthesia professional. The patient's blood pressure, heart rate, oxygen and respirations were  monitored throughout the procedure. The scope was introduced through the mouth and advanced to the second part of the duodenum. Retroflexion was performed in the fundus. The patient's estimated blood loss was minimal (<5 mL). The procedure was not difficult. The patient tolerated the procedure well. There were no apparent adverse events. ANESTHESIA INFORMATION: ASA: III Anesthesia Type: IV Sedation with Anesthesia MEDICATIONS: No administrations occurring from 1325 to 1343 on 11/27/24 FINDINGS: The upper third of the esophagus, middle third of the esophagus, lower third of the esophagus, GE junction and Z-line appeared normal. Z-line is 36 cm from the incisors. The stomach appeared normal. Performed random biopsy using biopsy forceps to rule out H. pylori. The duodenal bulb, 1st part of the duodenum and 2nd part of the duodenum appeared normal. Performed random biopsy using biopsy forceps to rule out celiac disease. SPECIMENS: ID Type Source Tests Collected by Time Destination 1 : r/o celiac Tissue Duodenum TISSUE EXAM Chico Kevon Padilla, DO 11/27/2024  1:40 PM  2 : r/o H.Pylori Tissue Stomach TISSUE EXAM Chico Padilla, DO 11/27/2024  1:40 PM      Impression: The upper third of the esophagus, middle third of the esophagus, lower third of the esophagus, GE junction and Z-line appeared normal. The stomach appeared normal. Performed random biopsy to rule out H. pylori. The duodenal bulb, 1st part of the duodenum and 2nd part of the duodenum appeared normal. Performed random biopsy to rule out celiac disease. RECOMMENDATION: Await pathology results Proceed with colonoscopy Your esophagus, stomach and small intestine (duodenum) did not have any significant abnormalities on your endoscopy today. We did biopsy your small intestine and stomach. I am glad to hear you are improving on the acid suppression medication. Please continue that for now and we will contact you with the biopsy results in 1-2 weeks.    [place]  "Kevon Padilla DO     Review of Systems   Constitutional: Positive for weight gain.   Cardiovascular:  Positive for irregular heartbeat, palpitations and paroxysmal nocturnal dyspnea. Negative for chest pain, dyspnea on exertion, leg swelling, near-syncope and syncope.   Respiratory:  Positive for cough, shortness of breath and sleep disturbances due to breathing.        Vitals:    12/23/24 0947   BP: 141/91   Pulse: 72     Vitals:    12/23/24 0947   Weight: 102 kg (224 lb)     Height: 4' 11\" (149.9 cm)   Body mass index is 45.24 kg/m².    Physical Exam  Constitutional:       Appearance: Normal appearance.   HENT:      Head: Normocephalic and atraumatic.      Mouth/Throat:      Mouth: Mucous membranes are moist.   Cardiovascular:      Rate and Rhythm: Normal rate and regular rhythm.      Pulses: Normal pulses.      Heart sounds: Normal heart sounds. No murmur heard.  Pulmonary:      Effort: Pulmonary effort is normal.      Breath sounds: Normal breath sounds.   Abdominal:      General: Bowel sounds are normal.      Palpations: Abdomen is soft.   Musculoskeletal:      Cervical back: Normal range of motion.      Right lower leg: No edema.      Left lower leg: No edema.   Skin:     General: Skin is warm.      Capillary Refill: Capillary refill takes less than 2 seconds.   Neurological:      General: No focal deficit present.      Mental Status: She is alert and oriented to person, place, and time.         "

## 2024-12-27 ENCOUNTER — TELEPHONE (OUTPATIENT)
Age: 44
End: 2024-12-27

## 2024-12-27 VITALS
HEIGHT: 59 IN | DIASTOLIC BLOOD PRESSURE: 109 MMHG | OXYGEN SATURATION: 99 % | SYSTOLIC BLOOD PRESSURE: 174 MMHG | TEMPERATURE: 97.8 F | HEART RATE: 86 BPM | WEIGHT: 221 LBS | RESPIRATION RATE: 22 BRPM | BODY MASS INDEX: 44.55 KG/M2

## 2024-12-27 DIAGNOSIS — E05.00 GRAVES DISEASE: ICD-10-CM

## 2024-12-27 DIAGNOSIS — M79.10 MYALGIA: ICD-10-CM

## 2024-12-27 DIAGNOSIS — E05.90 HYPERTHYROIDISM: Primary | ICD-10-CM

## 2024-12-27 DIAGNOSIS — E05.90 HYPERTHYROIDISM: ICD-10-CM

## 2024-12-27 DIAGNOSIS — M79.10 MYALGIA: Primary | ICD-10-CM

## 2024-12-27 PROCEDURE — 99283 EMERGENCY DEPT VISIT LOW MDM: CPT

## 2024-12-27 NOTE — TELEPHONE ENCOUNTER
Lets get repeat blood work done now.  I did order some extra tests including a special muscle test.

## 2024-12-27 NOTE — TELEPHONE ENCOUNTER
On 12-2 experienced thyroid storm and was hospitalized. States she has developed extreme muscle aches and muscle fatigue since [place] Day. States symtoms come and go and she felt on Christmas her legs wouldn't respond to walk. States yesterday, left shoulder and left wrist had limited movement and pain. States back of neck on left side she has had pain for 3 days. State everything has resolved right now except for neck pain. States she has been taking Tylenol Extra Strength and it has helped mobility. Please advise, thank you.

## 2024-12-27 NOTE — TELEPHONE ENCOUNTER
I spoke with the patient and she is going to go for her lab work on Saturday morning at [facility]

## 2024-12-28 ENCOUNTER — HOSPITAL ENCOUNTER (EMERGENCY)
Facility: HOSPITAL | Age: 44
Discharge: HOME | End: 2024-12-28
Attending: EMERGENCY MEDICINE | Admitting: EMERGENCY MEDICINE
Payer: COMMERCIAL

## 2024-12-28 DIAGNOSIS — M62.838 TRAPEZIUS MUSCLE SPASM: Primary | ICD-10-CM

## 2024-12-28 DIAGNOSIS — E05.90 HYPERTHYROIDISM: ICD-10-CM

## 2024-12-28 LAB
ANION GAP SERPL CALCULATED.3IONS-SCNC: 8 MMOL/L (ref 4–13)
BASOPHILS # BLD AUTO: 0.01 THOUSANDS/ÂΜL (ref 0–0.1)
BASOPHILS NFR BLD AUTO: 0 % (ref 0–1)
BUN SERPL-MCNC: 10 MG/DL (ref 5–25)
CALCIUM SERPL-MCNC: 8.8 MG/DL (ref 8.4–10.2)
CHLORIDE SERPL-SCNC: 107 MMOL/L (ref 96–108)
CK SERPL-CCNC: 23 U/L (ref 26–192)
CO2 SERPL-SCNC: 22 MMOL/L (ref 21–32)
CREAT SERPL-MCNC: 0.51 MG/DL (ref 0.6–1.3)
EOSINOPHIL # BLD AUTO: 0.26 THOUSAND/ÂΜL (ref 0–0.61)
EOSINOPHIL NFR BLD AUTO: 5 % (ref 0–6)
ERYTHROCYTE [DISTWIDTH] IN BLOOD BY AUTOMATED COUNT: 15.2 % (ref 11.6–15.1)
GFR SERPL CREATININE-BSD FRML MDRD: 115 ML/MIN/1.73SQ M
GLUCOSE SERPL-MCNC: 122 MG/DL (ref 65–140)
HCT VFR BLD AUTO: 39.3 % (ref 34.8–46.1)
HGB BLD-MCNC: 12.3 G/DL (ref 11.5–15.4)
IMM GRANULOCYTES # BLD AUTO: 0.02 THOUSAND/UL (ref 0–0.2)
IMM GRANULOCYTES NFR BLD AUTO: 0 % (ref 0–2)
LYMPHOCYTES # BLD AUTO: 1.14 THOUSANDS/ÂΜL (ref 0.6–4.47)
LYMPHOCYTES NFR BLD AUTO: 20 % (ref 14–44)
MCH RBC QN AUTO: 24.6 PG (ref 26.8–34.3)
MCHC RBC AUTO-ENTMCNC: 31.3 G/DL (ref 31.4–37.4)
MCV RBC AUTO: 79 FL (ref 82–98)
MONOCYTES # BLD AUTO: 0.51 THOUSAND/ÂΜL (ref 0.17–1.22)
MONOCYTES NFR BLD AUTO: 9 % (ref 4–12)
NEUTROPHILS # BLD AUTO: 3.78 THOUSANDS/ÂΜL (ref 1.85–7.62)
NEUTS SEG NFR BLD AUTO: 66 % (ref 43–75)
NRBC BLD AUTO-RTO: 0 /100 WBCS
PLATELET # BLD AUTO: 308 THOUSANDS/UL (ref 149–390)
PMV BLD AUTO: 10.4 FL (ref 8.9–12.7)
POTASSIUM SERPL-SCNC: 4.8 MMOL/L (ref 3.5–5.3)
RBC # BLD AUTO: 5 MILLION/UL (ref 3.81–5.12)
SODIUM SERPL-SCNC: 137 MMOL/L (ref 135–147)
T3FREE SERPL-MCNC: 4.68 PG/ML (ref 2.5–3.9)
T4 FREE SERPL-MCNC: 1.24 NG/DL (ref 0.61–1.12)
TSH SERPL DL<=0.05 MIU/L-ACNC: <0.01 UIU/ML (ref 0.45–4.5)
WBC # BLD AUTO: 5.72 THOUSAND/UL (ref 4.31–10.16)

## 2024-12-28 PROCEDURE — 99284 EMERGENCY DEPT VISIT MOD MDM: CPT | Performed by: EMERGENCY MEDICINE

## 2024-12-28 PROCEDURE — 96372 THER/PROPH/DIAG INJ SC/IM: CPT

## 2024-12-28 PROCEDURE — 84443 ASSAY THYROID STIM HORMONE: CPT | Performed by: EMERGENCY MEDICINE

## 2024-12-28 PROCEDURE — 36415 COLL VENOUS BLD VENIPUNCTURE: CPT | Performed by: EMERGENCY MEDICINE

## 2024-12-28 PROCEDURE — 85025 COMPLETE CBC W/AUTO DIFF WBC: CPT | Performed by: EMERGENCY MEDICINE

## 2024-12-28 PROCEDURE — 84481 FREE ASSAY (FT-3): CPT | Performed by: EMERGENCY MEDICINE

## 2024-12-28 PROCEDURE — 80048 BASIC METABOLIC PNL TOTAL CA: CPT | Performed by: EMERGENCY MEDICINE

## 2024-12-28 PROCEDURE — 82550 ASSAY OF CK (CPK): CPT | Performed by: EMERGENCY MEDICINE

## 2024-12-28 PROCEDURE — 84439 ASSAY OF FREE THYROXINE: CPT | Performed by: EMERGENCY MEDICINE

## 2024-12-28 RX ORDER — KETOROLAC TROMETHAMINE 30 MG/ML
15 INJECTION, SOLUTION INTRAMUSCULAR; INTRAVENOUS ONCE
Status: COMPLETED | OUTPATIENT
Start: 2024-12-28 | End: 2024-12-28

## 2024-12-28 RX ORDER — KETOROLAC TROMETHAMINE 30 MG/ML
15 INJECTION, SOLUTION INTRAMUSCULAR; INTRAVENOUS ONCE
Status: DISCONTINUED | OUTPATIENT
Start: 2024-12-28 | End: 2024-12-28

## 2024-12-28 RX ADMIN — KETOROLAC TROMETHAMINE 15 MG: 30 INJECTION, SOLUTION INTRAMUSCULAR; INTRAVENOUS at 03:00

## 2024-12-28 NOTE — ED PROVIDER NOTES
Time reflects when diagnosis was documented in both MDM as applicable and the Disposition within this note       Time User Action Codes Description Comment    12/28/2024  3:22 AM Aly Chakraborty [M62.838] Trapezius muscle spasm     12/28/2024  3:22 AM Aly Chakraborty [E05.90] Hyperthyroidism           ED Disposition       ED Disposition   Discharge    Condition   Stable    Date/Time   Sat Dec 28, 2024  3:22 AM    Comment   Chen Peace discharge to home/self care.                   Assessment & Plan       Medical Decision Making     Reviewed past medical records: Yes previous hospitalization reviewed     History Provided by: Patient,      Differential considered: Electrolyte disturbance, recurrent thyroid storm     Consideration of tests: Patient is electrolytes are within normal limits, TSH depressed in the setting of hyperthyroidism.  Patient feeling better after anti-inflammatories for left-sided paraspinal muscle spasm.  No signs of meningitis on exam or concerns for infection.  Plan for outpatient follow-up.       I have reviewed the patient's visit and any testing done in the emergency department.  They have verbalized their understanding of any testing done today and have no further questions or concerns regarding their care in the emergency room.  They will follow up with their primary care physician as well as with any specialist in their discharge instructions.  Strict return precautions were discussed.    Amount and/or Complexity of Data Reviewed  Labs: ordered.    Risk  Prescription drug management.             Medications   ketorolac (TORADOL) injection 15 mg (15 mg Intramuscular Given 12/28/24 0300)       ED Risk Strat Scores                          SBIRT 20yo+      Flowsheet Row Most Recent Value   Initial Alcohol Screen: US AUDIT-C     1. How often do you have a drink containing alcohol? 0 Filed at: 12/27/2024 0343   2. How many drinks containing alcohol do you have on a typical  day you are drinking?  0 Filed at: 2024   3b. FEMALE Any Age, or MALE 65+: How often do you have 4 or more drinks on one occassion? 0 Filed at: 2024   Audit-C Score 0 Filed at: 2024   TYLER: How many times in the past year have you...    Used an illegal drug or used a prescription medication for non-medical reasons? Never Filed at: 2024                            History of Present Illness       Chief Complaint   Patient presents with    Neck Pain       Past Medical History:   Diagnosis Date    Anemia     Anxiety and depression     Fibroids     Gallstones     Hyperlipidemia     Hypertension     Hyperthyroidism determined by thyroid function test     Menorrhagia with regular cycle     Obesity     Reactive airway disease     Vitamin D deficiency       Past Surgical History:   Procedure Laterality Date     SECTION      X2    MAMMO (HISTORICAL)  2020    TUBAL LIGATION      pt had embedded IUD removed and tubal ligation performed      Family History   Problem Relation Age of Onset    Hyperthyroidism Mother     Thyroid disease unspecified Mother         thyroidectomy for hyperthyroidism    Early menopause Mother     Endometriosis Mother     Hypertension Mother     Hyperlipidemia Mother     Breast cancer Mother     Skin cancer Father     Stroke Father     Coronary artery disease Father     Hypertension Father     Hyperlipidemia Father     Hypertension Maternal Aunt     Hypertension Maternal Uncle     Thyroid disease unspecified Paternal Aunt         thyroidectomy    Hypertension Paternal Aunt     Hypertension Paternal Uncle     Hypothyroidism Maternal Grandmother     Colon cancer Maternal Grandmother     Hypertension Maternal Grandmother     Hypertension Maternal Grandfather     Hypertension Paternal Grandmother     Hypertension Paternal Grandfather     Thyroid disease unspecified Other     Thyroid disease unspecified Cousin         paternal cousin with  thyroidectomy    Hypothyroidism Cousin         maternal cousin with hypothyroidism    No Known Problems Son     No Known Problems Daughter     Ovarian cancer Neg Hx     Uterine cancer Neg Hx       Social History     Tobacco Use    Smoking status: Never     Passive exposure: Never    Smokeless tobacco: Never   Vaping Use    Vaping status: Never Used   Substance Use Topics    Alcohol use: Yes     Comment: occasional    Drug use: Never      E-Cigarette/Vaping    E-Cigarette Use Never User       E-Cigarette/Vaping Substances    Nicotine No     THC No     CBD No     Flavoring No     Other No     Unknown No       I have reviewed and agree with the history as documented.     Patient presents with:  Neck Pain    44-year-old female recently treated for thyroid storm.  Having left-sided neck pain and stiffness and pain with movement.  Also has been having intermittent but chronic dizziness that is improving.  No chest pain has felt nauseous but no vomiting or diarrhea.  Patient states that she also was sent to get outpatient lab work done for endocrinologist.  She also endorsing muscle spasms and vague paresthesias to lower extremities back and arms.        Review of Systems   Constitutional: Negative.  Negative for chills and fever.   HENT: Negative.  Negative for rhinorrhea, sore throat, trouble swallowing and voice change.    Eyes: Negative.  Negative for pain and visual disturbance.   Respiratory: Negative.  Negative for cough, shortness of breath and wheezing.    Cardiovascular: Negative.  Negative for chest pain and palpitations.   Gastrointestinal:  Negative for abdominal pain, diarrhea, nausea and vomiting.   Genitourinary: Negative.  Negative for dysuria and frequency.   Musculoskeletal:  Positive for myalgias and neck pain. Negative for neck stiffness.   Skin: Negative.  Negative for rash.   Neurological: Negative.  Negative for dizziness, speech difficulty, weakness, light-headedness and numbness.            Objective       ED Triage Vitals [12/27/24 2306]   Temperature Pulse Blood Pressure Respirations SpO2 Patient Position - Orthostatic VS   97.8 °F (36.6 °C) 86 (!) 174/109 22 99 % Sitting      Temp Source Heart Rate Source BP Location FiO2 (%) Pain Score    Temporal -- Left arm -- 6      Vitals      Date and Time Temp Pulse SpO2 Resp BP Pain Score FACES Pain Rating User   12/27/24 2306 97.8 °F (36.6 °C) 86 99 % 22 174/109 6 -- WM            Physical Exam    Results Reviewed       Procedure Component Value Units Date/Time    TSH [[id number]]  (Abnormal) Collected: 12/28/24 0211    Lab Status: Final result Specimen: Blood from Arm, Left Updated: 12/28/24 0254     TSH 3RD GENERATON <0.010 uIU/mL     CK [[id number]]  (Abnormal) Collected: 12/28/24 0211    Lab Status: Final result Specimen: Blood from Arm, Left Updated: 12/28/24 0238     Total CK 23 U/L     Basic metabolic panel [[id number]]  (Abnormal) Collected: 12/28/24 0211    Lab Status: Final result Specimen: Blood from Arm, Left Updated: 12/28/24 0238     Sodium 137 mmol/L      Potassium 4.8 mmol/L      Chloride 107 mmol/L      CO2 22 mmol/L      ANION GAP 8 mmol/L      BUN 10 mg/dL      Creatinine 0.51 mg/dL      Glucose 122 mg/dL      Calcium 8.8 mg/dL      eGFR 115 ml/min/1.73sq m     Narrative:      National Kid[employer] Disease Foundation guidelines for Chronic Kidney Disease (CKD):     Stage 1 with normal or high GFR (GFR > 90 mL/min/1.73 square meters)    Stage 2 Mild CKD (GFR = 60-89 mL/min/1.73 square meters)    Stage 3A Moderate CKD (GFR = 45-59 mL/min/1.73 square meters)    Stage 3B Moderate CKD (GFR = 30-44 mL/min/1.73 square meters)    Stage 4 Severe CKD (GFR = 15-29 mL/min/1.73 square meters)    Stage 5 End Stage CKD (GFR <15 mL/min/1.73 square meters)  Note: GFR calculation is accurate only with a steady state creatinine    CBC and differential [[id number]]  (Abnormal) Collected: 12/28/24 0211    Lab Status: Final result Specimen: Blood from Arm,  Left Updated: 24     WBC 5.72 Thousand/uL      RBC 5.00 Million/uL      Hemoglobin 12.3 g/dL      Hematocrit 39.3 %      MCV 79 fL      MCH 24.6 pg      MCHC 31.3 g/dL      RDW 15.2 %      MPV 10.4 fL      Platelets 308 Thousands/uL      nRBC 0 /100 WBCs      Segmented % 66 %      Immature Grans % 0 %      Lymphocytes % 20 %      Monocytes % 9 %      Eosinophils Relative 5 %      Basophils Relative 0 %      Absolute Neutrophils 3.78 Thousands/µL      Absolute Immature Grans 0.02 Thousand/uL      Absolute Lymphocytes 1.14 Thousands/µL      Absolute Monocytes 0.51 Thousand/µL      Eosinophils Absolute 0.26 Thousand/µL      Basophils Absolute 0.01 Thousands/µL     T3, free [[id number]] Collected: 24    Lab Status: In process Specimen: Blood from Arm, Left Updated: 24    T4, free [[id number]] Collected: 24    Lab Status: In process Specimen: Blood from Arm, Left Updated: 24            No orders to display       Procedures    ED Medication and Procedure Management   Prior to Admission Medications   Prescriptions Last Dose Informant Patient Reported? Taking?   Pepcid 40 MG tablet  Self Yes No   Si mg daily at bedtime   RABEprazole (ACIPHEX) 20 MG tablet  Self No No   Sig: Take 1 tablet (20 mg total) by mouth daily   albuterol (VENTOLIN HFA) 90 mcg/act inhaler  Self No No   Sig: Inhale 2 puffs every 6 (six) hours as needed for wheezing   co-enzyme Q-10 50 MG capsule  Self Yes No   Sig: Take 100 mg by mouth daily     ergocalciferol (VITAMIN D2) 50,000 units  Self Yes No   Sig: Take 50,000 Units by mouth once a week Last taken    ferrous sulfate 325 (65 FE) MG EC tablet  Self Yes No   Sig: Take 325 mg by mouth 3 (three) times a day with meals   methimazole (TAPAZOLE) 10 mg tablet  Self No No   Sig: Take 2 tablets (20 mg total) by mouth every 12 (twelve) hours   pravastatin (PRAVACHOL) 20 mg tablet  Self Yes No   Sig: Take 1 tablet by mouth in the morning Takes in  evening   propranolol (INDERAL LA) 160 mg   No No   Sig: Take 1 capsule (160 mg total) by mouth 2 (two) times a day   saccharomyces boulardii (FLORASTOR) 250 mg capsule  Self Yes No   Sig: Take 250 mg by mouth in the morning      Facility-Administered Medications: None     Discharge Medication List as of 12/28/2024  3:23 AM        CONTINUE these medications which have NOT CHANGED    Details   albuterol (VENTOLIN HFA) 90 mcg/act inhaler Inhale 2 puffs every 6 (six) hours as needed for wheezing, Starting Tue 11/27/2018, Normal      co-enzyme Q-10 50 MG capsule Take 100 mg by mouth daily  , Historical Med      ergocalciferol (VITAMIN D2) 50,000 units Take 50,000 Units by mouth once a week Last taken 12/1, Starting Tue 11/26/2024, Historical Med      ferrous sulfate 325 (65 FE) MG EC tablet Take 325 mg by mouth 3 (three) times a day with meals, Historical Med      methimazole (TAPAZOLE) 10 mg tablet Take 2 tablets (20 mg total) by mouth every 12 (twelve) hours, Starting Fri 12/13/2024, Normal      Pepcid 40 MG tablet 40 mg daily at bedtime, Historical Med      pravastatin (PRAVACHOL) 20 mg tablet Take 1 tablet by mouth in the morning Takes in evening, Starting Tue 10/29/2024, Historical Med      propranolol (INDERAL LA) 160 mg Take 1 capsule (160 mg total) by mouth 2 (two) times a day, Starting Mon 12/23/2024, Normal      RABEprazole (ACIPHEX) 20 MG tablet Take 1 tablet (20 mg total) by mouth daily, Starting Wed 11/20/2024, Until Tue 2/18/2025, Normal      saccharomyces boulardii (FLORASTOR) 250 mg capsule Take 250 mg by mouth in the morning, Historical Med           No discharge procedures on file.  ED SEPSIS DOCUMENTATION   Time reflects when diagnosis was documented in both MDM as applicable and the Disposition within this note       Time User Action Codes Description Comment    12/28/2024  3:22 AM Aly Chakraborty [M62.838] Trapezius muscle spasm     12/28/2024  3:22 AM Aly Chakraborty [E05.90]  Hyperthyroidism                  Aly Chakraborty,   12/28/24 0434

## 2024-12-28 NOTE — ED TRIAGE NOTES
Pt states 12/25 she was having trouble with her legs and a stiff neck. 12/26 pt states having had difficulty moving her l arm. Tonight pt with difficulty turning her head to the right.

## 2024-12-28 NOTE — Clinical Note
Chen Peace was seen and treated in our emergency department on 12/27/2024.                Diagnosis:     Chen  .    She may return on this date: 12/31/2024         If you have any questions or concerns, please don't hesitate to call.      Aly Chakraborty, DO    ______________________________           _______________          _______________  Hospital Representative                              Date                                Time

## 2025-01-07 ENCOUNTER — HOSPITAL ENCOUNTER (OUTPATIENT)
Dept: ULTRASOUND IMAGING | Facility: CLINIC | Age: 45
Discharge: HOME | End: 2025-01-07
Payer: COMMERCIAL

## 2025-01-07 DIAGNOSIS — N85.2 ENLARGED UTERUS: ICD-10-CM

## 2025-01-07 PROCEDURE — 76856 US EXAM PELVIC COMPLETE: CPT

## 2025-01-07 PROCEDURE — 76830 TRANSVAGINAL US NON-OB: CPT

## 2025-01-14 ENCOUNTER — CLINICAL SUPPORT (OUTPATIENT)
Dept: CARDIOLOGY CLINIC | Facility: CLINIC | Age: 45
End: 2025-01-14

## 2025-01-14 DIAGNOSIS — R55 SYNCOPE, UNSPECIFIED SYNCOPE TYPE: Primary | ICD-10-CM

## 2025-01-14 DIAGNOSIS — R55 SYNCOPE: ICD-10-CM

## 2025-01-17 ENCOUNTER — RESULTS FOLLOW-UP (OUTPATIENT)
Dept: NON INVASIVE DIAGNOSTICS | Facility: HOSPITAL | Age: 45
End: 2025-01-17

## 2025-01-21 NOTE — TELEPHONE ENCOUNTER
----- Message from PEDRO Nix sent at 1/17/2025  2:34 PM EST -----  Regarding: Test Results  Hey there     Can you please give rodriguez a call and let her know that her results from the cardiac monitor came back.  Everything looks good there was no significant pauses or any concerning rhythms.      Thank you,  Paty  ----- Message -----  From: Nalini Gomes MA  Sent: 1/14/2025   4:18 PM EST  To: PEDRO Bella

## 2025-01-24 ENCOUNTER — OFFICE VISIT (OUTPATIENT)
Dept: CARDIOLOGY CLINIC | Facility: CLINIC | Age: 45
End: 2025-01-24
Payer: COMMERCIAL

## 2025-01-24 VITALS
SYSTOLIC BLOOD PRESSURE: 152 MMHG | HEART RATE: 75 BPM | BODY MASS INDEX: 46.57 KG/M2 | HEIGHT: 59 IN | WEIGHT: 231 LBS | DIASTOLIC BLOOD PRESSURE: 82 MMHG

## 2025-01-24 DIAGNOSIS — R00.0 TACHYCARDIA: ICD-10-CM

## 2025-01-24 DIAGNOSIS — I10 ESSENTIAL HYPERTENSION: ICD-10-CM

## 2025-01-24 DIAGNOSIS — E05.00 GRAVES DISEASE: Primary | ICD-10-CM

## 2025-01-24 DIAGNOSIS — E78.5 HYPERLIPIDEMIA, UNSPECIFIED HYPERLIPIDEMIA TYPE: ICD-10-CM

## 2025-01-24 DIAGNOSIS — R55 SYNCOPE, UNSPECIFIED SYNCOPE TYPE: ICD-10-CM

## 2025-01-24 PROCEDURE — 99213 OFFICE O/P EST LOW 20 MIN: CPT

## 2025-01-24 RX ORDER — AMLODIPINE BESYLATE 5 MG/1
5 TABLET ORAL DAILY
Qty: 30 TABLET | Refills: 1 | Status: SHIPPED | OUTPATIENT
Start: 2025-01-24

## 2025-01-24 NOTE — PROGRESS NOTES
Cardiology Follow Up    Chen Peace  1980  [id number]  [facility]  [address]  [facility]  [facility] [id number] [phone]    1. Graves disease        2. Essential hypertension  amLODIPine (NORVASC) 5 mg tablet      3. Hyperlipidemia, unspecified hyperlipidemia type        4. Syncope, unspecified syncope type        5. Tachycardia          Discussion/Summary:  1.) Graves' disease: Diagnosed after presenting to the hospital with syncopal episodes, thyroid level was undetectable at that time.  Patient follows with endocrinology outpatient last visit 12/13/2024. Currently prescribed Methimazole 20 mg twice daily, propranolol 160 mg twice daily.  Ultimately patient will most likely require thyroid surgery, once her thyroid is stabilized.  She had f/u lab work on Monday and then she f/u with her endocrinologist.      2.) Syncope: Patient had 2 syncopal episodes prior to her hospitalization, in the setting of thyroid storm.  Zio monitor without evidence of pauses or ectopy, reviewed with patient      3) Tachycardia/palpitations: Treated with propanolol 160 mg, patient reporting less palpations than previous.  Continue with propranolol.     4.)  Hypertension: Patient with history of hypertension, previously on atenolol 50 daily.  With recent hospitalization and diagnosis of Graves' disease with thyroid storm patient switched to propranolol.   She remains hypertensive today, and at her last PCP visit she reports she was 150s over 90s.  Will start Norvasc 5 mg daily.  Patient has a blood pressure cuff at home and will monitor, she will call the office if she dose not see any improvement with her BP with a goal of 130/80 or less.      Diagnostics:  TTE 12/3/2024: EF 65 to 70%, concentric left ventricular remodeling  14 day Ambulatory Holter Monitor: Patient had a min HR of 54 bpm, max HR of 121 bpm, and avg HR of 85 bpm.  Predominant underlying rhythm was Sinus Rhythm.    Interval History:   Chen Peace is a 44 y.o. female with a past medical history as below, here for a 1 month follow up.  Since I last saw her, patient visited the emergency room with left-sided neck pain and stiffness.  She was treated with ketorolac with good effect.  labs drawn at that visit resulted in TSH less than 0.010, but a decrease in her free T4 from 2.29 to 1.24.  Electrolytes at that visit all normal.  She also completed a 14-day Zio monitor which showed no evidence of arrhythmias, pauses or any significant ectopy.    Patient reports a decrease in palpitations, and overall she is feeling better.  She does note that she has occasional muscle spasms, in various locations.  She has been taking Advil with good effect.  She does also endorse some fatigue, taking longer to do daily activities and previous.  Patient has been out of work since her diagnosis of Graves' disease.  She is a  and plans to continue to stay out of work until more definitive decisions been made about thyroid surgery.  I agree with this approach.  Medical Problems       Problem List       Gastroesophageal reflux disease    BMI 45.0-49.9, adult (HCC)    Low TSH level    Tachycardia    Syncope    Essential hypertension    Morbid obesity (HCC)    Hyperthyroidism    Graves disease        Past Medical History:   Diagnosis Date    Anemia     Anxiety and depression     Fibroids     Gallstones     Hyperlipidemia     Hypertension     Hyperthyroidism determined by thyroid function test     Menorrhagia with regular cycle     Obesity     Reactive airway disease     Vitamin D deficiency      Social History     Socioeconomic History    Marital status: /Civil Union     Spouse name: Not on file    Number of children: Not on file    Years of education: Not on file    Highest education level: Not on file   Occupational History    Not on file   Tobacco Use    Smoking status:  Never     Passive exposure: Never    Smokeless tobacco: Never   Vaping Use    Vaping status: Never Used   Substance and Sexual Activity    Alcohol use: Yes     Comment: occasional    Drug use: Never    Sexual activity: Yes     Partners: Male     Birth control/protection: Female Sterilization   Other Topics Concern    Not on file   Social History Narrative    Not on file     Social Drivers of Health     Financial Resource Strain: Not on file   Food Insecurity: No Food Insecurity (2024)    Nursing - Inadequate Food Risk Classification     Worried About Running Out of Food in the Last Year: Not on file     Ran Out of Food in the Last Year: Not on file     Ran Out of Food in the Last Year: Never true   Transportation Needs: No Transportation Needs (2024)    Nursing - Transportation Risk Classification     Lack of Transportation: Not on file     Lack of Transportation: No   Physical Activity: Not on file   Stress: Not on file   Social Connections: Not on file   Intimate Partner Violence: Unknown (2024)    Nursing IPS     Feels Physically and Emotionally Safe: Not on file     Physically Hurt by Someone: Not on file     Humiliated or Emotionally Abused by Someone: Not on file     Physically Hurt by Someone: No     Hurt or Threatened by Someone: No   Housing Stability: Unknown (2024)    Nursing: Inadequate Housing Risk Classification     Has Housing: Not on file     Worried About Losing Housing: Not on file     Unable to Get Utilities: Not on file     Unable to Pay for Housing in the Last Year: No     Has Housin      Family History   Problem Relation Age of Onset    Hyperthyroidism Mother     Thyroid disease unspecified Mother         thyroidectomy for hyperthyroidism    Early menopause Mother     Endometriosis Mother     Hypertension Mother     Hyperlipidemia Mother     Breast cancer Mother     Skin cancer Father     Stroke Father     Coronary artery disease Father     Hypertension Father      Hyperlipidemia Father     Hypertension Maternal Aunt     Hypertension Maternal Uncle     Thyroid disease unspecified Paternal Aunt         thyroidectomy    Hypertension Paternal Aunt     Hypertension Paternal Uncle     Hypothyroidism Maternal Grandmother     Colon cancer Maternal Grandmother     Hypertension Maternal Grandmother     Hypertension Maternal Grandfather     Hypertension Paternal Grandmother     Hypertension Paternal Grandfather     Thyroid disease unspecified Other     Thyroid disease unspecified Cousin         paternal cousin with thyroidectomy    Hypothyroidism Cousin         maternal cousin with hypothyroidism    No Known Problems Son     No Known Problems Daughter     Ovarian cancer Neg Hx     Uterine cancer Neg Hx      Past Surgical History:   Procedure Laterality Date     SECTION      X2    MAMMO (HISTORICAL)  2020    TUBAL LIGATION      pt had embedded IUD removed and tubal ligation performed       Current Outpatient Medications:     amLODIPine (NORVASC) 5 mg tablet, Take 1 tablet (5 mg total) by mouth daily, Disp: 30 tablet, Rfl: 1    co-enzyme Q-10 50 MG capsule, Take 100 mg by mouth daily  , Disp: , Rfl:     ergocalciferol (VITAMIN D2) 50,000 units, Take 50,000 Units by mouth once a week Last taken , Disp: , Rfl:     ferrous sulfate 325 (65 FE) MG EC tablet, Take 325 mg by mouth 3 (three) times a day with meals, Disp: , Rfl:     methimazole (TAPAZOLE) 10 mg tablet, Take 2 tablets (20 mg total) by mouth every 12 (twelve) hours, Disp: 120 tablet, Rfl: 6    Pepcid 40 MG tablet, 40 mg daily at bedtime, Disp: , Rfl:     pravastatin (PRAVACHOL) 20 mg tablet, Take 1 tablet by mouth in the morning Takes in evening, Disp: , Rfl:     propranolol (INDERAL LA) 160 mg, Take 1 capsule (160 mg total) by mouth 2 (two) times a day, Disp: 60 capsule, Rfl: 1    RABEprazole (ACIPHEX) 20 MG tablet, Take 1 tablet (20 mg total) by mouth daily, Disp: 30 tablet, Rfl: 2    saccharomyces boulardii  "(FLORASTOR) 250 mg capsule, Take 250 mg by mouth in the morning, Disp: , Rfl:     albuterol (VENTOLIN HFA) 90 mcg/act inhaler, Inhale 2 puffs every 6 (six) hours as needed for wheezing (Patient not taking: Reported on 1/24/2025), Disp: 18 g, Rfl: 0  Allergies   Allergen Reactions    Sulfa Antibiotics Anaphylaxis    Sulfamethoxazole-Trimethoprim Anaphylaxis    Mupirocin Hives     Other Reaction(s): swelling lips, flushed cheeks       Labs:     Chemistry        Component Value Date/Time     11/26/2015 1400    K 4.8 12/28/2024 0211    K 6.3 (HH) 11/26/2015 1400     12/28/2024 0211     11/26/2015 1400    CO2 22 12/28/2024 0211    CO2 24.7 11/26/2015 1400    BUN 10 12/28/2024 0211    BUN 8 11/26/2015 1400    CREATININE 0.51 (L) 12/28/2024 0211    CREATININE 0.16 (L) 11/26/2015 1400        Component Value Date/Time    CALCIUM 8.8 12/28/2024 0211    CALCIUM 9.9 11/26/2015 1400    ALKPHOS 40 12/05/2024 0527    ALKPHOS 64 11/26/2015 1400    AST 17 12/05/2024 0527    AST 32 11/26/2015 1400    ALT 18 12/05/2024 0527    ALT 29 11/26/2015 1400    BILITOT 0.61 11/26/2015 1400        Review of Systems   Constitutional: Positive for malaise/fatigue.   Cardiovascular:  Negative for chest pain, dyspnea on exertion, irregular heartbeat, leg swelling, near-syncope, orthopnea, palpitations, paroxysmal nocturnal dyspnea and syncope.   Respiratory:  Negative for shortness of breath.    Musculoskeletal:  Positive for myalgias and neck pain.       Vitals:    01/24/25 1357   BP: 152/82   Pulse: 75     Vitals:    01/24/25 1357   Weight: 105 kg (231 lb)     Height: 4' 11\" (149.9 cm)   Body mass index is 46.66 kg/m².    Physical Exam  Constitutional:       Appearance: Normal appearance. She is obese.   HENT:      Head: Normocephalic and atraumatic.      Mouth/Throat:      Mouth: Mucous membranes are dry.   Cardiovascular:      Rate and Rhythm: Normal rate and regular rhythm.      Pulses: Normal pulses.      Heart sounds: " Normal heart sounds.   Pulmonary:      Effort: Pulmonary effort is normal.      Breath sounds: Normal breath sounds.   Abdominal:      General: Bowel sounds are normal.      Palpations: Abdomen is soft.   Musculoskeletal:      Cervical back: Normal range of motion.      Right lower leg: No edema.      Left lower leg: No edema.   Skin:     General: Skin is warm.      Capillary Refill: Capillary refill takes less than 2 seconds.   Neurological:      General: No focal deficit present.      Mental Status: She is alert and oriented to person, place, and time.

## 2025-01-27 ENCOUNTER — APPOINTMENT (OUTPATIENT)
Dept: LAB | Facility: HOSPITAL | Age: 45
End: 2025-01-27
Payer: COMMERCIAL

## 2025-01-27 ENCOUNTER — RESULTS FOLLOW-UP (OUTPATIENT)
Dept: ENDOCRINOLOGY | Facility: HOSPITAL | Age: 45
End: 2025-01-27

## 2025-01-27 DIAGNOSIS — M79.10 MYALGIA: ICD-10-CM

## 2025-01-27 DIAGNOSIS — E05.00 GRAVES DISEASE: ICD-10-CM

## 2025-01-27 DIAGNOSIS — E05.90 HYPERTHYROIDISM: ICD-10-CM

## 2025-01-27 LAB
ALBUMIN SERPL BCG-MCNC: 4.1 G/DL (ref 3.5–5)
ALP SERPL-CCNC: 78 U/L (ref 34–104)
ALT SERPL W P-5'-P-CCNC: 25 U/L (ref 7–52)
ANION GAP SERPL CALCULATED.3IONS-SCNC: 7 MMOL/L (ref 4–13)
AST SERPL W P-5'-P-CCNC: 21 U/L (ref 13–39)
BASOPHILS # BLD AUTO: 0.03 THOUSANDS/ΜL (ref 0–0.1)
BASOPHILS NFR BLD AUTO: 1 % (ref 0–1)
BILIRUB SERPL-MCNC: 0.39 MG/DL (ref 0.2–1)
BUN SERPL-MCNC: 9 MG/DL (ref 5–25)
CALCIUM SERPL-MCNC: 9.5 MG/DL (ref 8.4–10.2)
CHLORIDE SERPL-SCNC: 106 MMOL/L (ref 96–108)
CK SERPL-CCNC: 24 U/L (ref 26–192)
CO2 SERPL-SCNC: 27 MMOL/L (ref 21–32)
CREAT SERPL-MCNC: 0.74 MG/DL (ref 0.6–1.3)
EOSINOPHIL # BLD AUTO: 0.2 THOUSAND/ΜL (ref 0–0.61)
EOSINOPHIL NFR BLD AUTO: 4 % (ref 0–6)
ERYTHROCYTE [DISTWIDTH] IN BLOOD BY AUTOMATED COUNT: 17 % (ref 11.6–15.1)
GFR SERPL CREATININE-BSD FRML MDRD: 97 ML/MIN/1.73SQ M
GLUCOSE P FAST SERPL-MCNC: 104 MG/DL (ref 65–99)
HCT VFR BLD AUTO: 41.9 % (ref 34.8–46.1)
HGB BLD-MCNC: 13 G/DL (ref 11.5–15.4)
IMM GRANULOCYTES # BLD AUTO: 0.02 THOUSAND/UL (ref 0–0.2)
IMM GRANULOCYTES NFR BLD AUTO: 0 % (ref 0–2)
LYMPHOCYTES # BLD AUTO: 1.32 THOUSANDS/ΜL (ref 0.6–4.47)
LYMPHOCYTES NFR BLD AUTO: 26 % (ref 14–44)
MCH RBC QN AUTO: 25.2 PG (ref 26.8–34.3)
MCHC RBC AUTO-ENTMCNC: 31 G/DL (ref 31.4–37.4)
MCV RBC AUTO: 81 FL (ref 82–98)
MONOCYTES # BLD AUTO: 0.46 THOUSAND/ΜL (ref 0.17–1.22)
MONOCYTES NFR BLD AUTO: 9 % (ref 4–12)
NEUTROPHILS # BLD AUTO: 3.06 THOUSANDS/ΜL (ref 1.85–7.62)
NEUTS SEG NFR BLD AUTO: 60 % (ref 43–75)
NRBC BLD AUTO-RTO: 0 /100 WBCS
PLATELET # BLD AUTO: 302 THOUSANDS/UL (ref 149–390)
PMV BLD AUTO: 10.2 FL (ref 8.9–12.7)
POTASSIUM SERPL-SCNC: 5.1 MMOL/L (ref 3.5–5.3)
PROT SERPL-MCNC: 7.3 G/DL (ref 6.4–8.4)
RBC # BLD AUTO: 5.16 MILLION/UL (ref 3.81–5.12)
SODIUM SERPL-SCNC: 140 MMOL/L (ref 135–147)
T3FREE SERPL-MCNC: 2.88 PG/ML (ref 2.5–3.9)
T4 FREE SERPL-MCNC: 0.56 NG/DL (ref 0.61–1.12)
TSH SERPL DL<=0.05 MIU/L-ACNC: 0.04 UIU/ML (ref 0.45–4.5)
WBC # BLD AUTO: 5.09 THOUSAND/UL (ref 4.31–10.16)

## 2025-01-27 PROCEDURE — 82550 ASSAY OF CK (CPK): CPT

## 2025-01-27 PROCEDURE — 36415 COLL VENOUS BLD VENIPUNCTURE: CPT

## 2025-01-27 PROCEDURE — 85025 COMPLETE CBC W/AUTO DIFF WBC: CPT

## 2025-01-27 PROCEDURE — 84439 ASSAY OF FREE THYROXINE: CPT

## 2025-01-27 PROCEDURE — 80053 COMPREHEN METABOLIC PANEL: CPT

## 2025-01-27 PROCEDURE — 84481 FREE ASSAY (FT-3): CPT

## 2025-01-27 PROCEDURE — 84443 ASSAY THYROID STIM HORMONE: CPT

## 2025-01-29 ENCOUNTER — OFFICE VISIT (OUTPATIENT)
Dept: ENDOCRINOLOGY | Facility: HOSPITAL | Age: 45
End: 2025-01-29
Payer: COMMERCIAL

## 2025-01-29 VITALS
SYSTOLIC BLOOD PRESSURE: 128 MMHG | DIASTOLIC BLOOD PRESSURE: 80 MMHG | OXYGEN SATURATION: 98 % | HEART RATE: 85 BPM | WEIGHT: 230 LBS | HEIGHT: 59 IN | BODY MASS INDEX: 46.37 KG/M2

## 2025-01-29 DIAGNOSIS — E05.00 GRAVES DISEASE: ICD-10-CM

## 2025-01-29 DIAGNOSIS — E05.90 HYPERTHYROIDISM: Primary | ICD-10-CM

## 2025-01-29 PROCEDURE — 99214 OFFICE O/P EST MOD 30 MIN: CPT | Performed by: INTERNAL MEDICINE

## 2025-01-29 RX ORDER — METHIMAZOLE 10 MG/1
TABLET ORAL
Qty: 120 TABLET | Refills: 6 | Status: SHIPPED | OUTPATIENT
Start: 2025-01-29

## 2025-01-29 NOTE — PROGRESS NOTES
1/29/2025    Assessment & Plan      Diagnoses and all orders for this visit:    Hyperthyroidism  -     T3, free; Future  -     T4, free; Future  -     TSH, 3rd generation; Future  -     Comprehensive metabolic panel; Future  -     T3, free; Future  -     T4, free; Future  -     TSH, 3rd generation; Future  -     Comprehensive metabolic panel; Future  -     CBC and differential; Future  -     methimazole (TAPAZOLE) 10 mg tablet; 2 in am and 1 in pm  -     Ambulatory Referral to Surgical Oncology; Future    Graves disease  -     T3, free; Future  -     T4, free; Future  -     TSH, 3rd generation; Future  -     Comprehensive metabolic panel; Future  -     T3, free; Future  -     T4, free; Future  -     TSH, 3rd generation; Future  -     Comprehensive metabolic panel; Future  -     CBC and differential; Future  -     Ambulatory Referral to Surgical Oncology; Future        Assessment & Plan  1. Hyperthyroidism due to Graves' disease.  Her T4 levels are subnormal, while T3 levels are within the normal range. Although TSH levels remain low, they are expected to normalize over time. Her thyroid function is currently under control, indicating that she is not experiencing hyperthyroidism at present. A comprehensive discussion regarding the potential benefits and risks associated with radioactive iodine therapy and surgical intervention was conducted. She was advised to consider these options for long-term management of her thyroid condition. The dosage of methimazole will be reduced to 3 tablets daily, with 2 tablets to be taken in the morning and 1 tablet in the evening.  She is seriously considering surgery as her definitive treatment as opposed to radioactive iodine.  A referral to Dr. Vanegas for further evaluation and potential surgical intervention will be initiated. Blood work will be repeated in 6 weeks and again in 3 months.    2. Hypertension.  She is currently on propranolol 160 mg twice a day and amlodipine for  blood pressure management. Her heart rate is in the 80s. She was advised to continue her current medication regimen. 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 Noble.  I have asked her to consider discussing with her primary care physician if the propranolol can be decreased some to improve her fatigue.  Perhaps utilizing 160 mg in the morning and 120 mg in the evening and adjusting her amlodipine dosage or another medication like to be applicable.    3. Iron deficiency.  She is on 324 mg of iron daily. Her blood work indicates iron deficiency, but she is not anemic. She was advised to continue her current iron supplementation. Intravenous iron was discussed as an option but deemed unnecessary at this time.    4. Uterine fibroids.  She reported having heavy menstrual periods and was found to have fibroids on ultrasound. A hysterectomy is being considered but will be postponed until her thyroid condition is stabilized.    I have asked her to repeat blood work in 6 weeks consisting of a TSH, free T4, free T3, and CMP.    I have asked her to follow-up in 3 months with preceding CMP, CBC, TSH, free T4, and free T3.    CC: Hyperthyroid follow-up    History of Present Illness    HPI: Chen Peace is a 44-year-old female with a history of hyperthyroidism due to Graves' disease, here for a follow-up visit.    She was hospitalized from late November to early December 2024 with severe thyrotoxicosis and thyroid storm. She was placed on antithyroid medications and a beta-blocker.     She is currently on methimazole 10 mg, taking 2 tablets twice daily, and propranolol 160 mg twice daily. She reports fluctuating health status, with periods of well-being followed by episodes of severe discomfort. She experiences significant joint pain, which led to an emergency room visit on 01/07/2025 due to neck immobility. The ER staff attributed this to her high-dose  medication regimen. She also reports intermittent heel pain and recent onset of knee pain. Despite these symptoms, she reports improvement in headaches and lightheadedness. However, she experiences dizziness when turning quickly. She continues to experience fatigue and occasional heart palpitations, although less frequently than before. She reports no tremors, diarrhea, or constipation. Her sleep quality has improved, with an average of 5 to 6 hours per night. She occasionally experiences neck irritation and swelling but has no difficulty swallowing. She does not report excessive sweating or cold intolerance. She reports peeling skin on her fingertips and nails but has not noticed any hair loss. She admits to emotional lability but does not report anxiety or depression. She does not report double vision. She has had her eyes measured for protrusion by an ophthalmologist, who found no abnormalities. She has been advised by Dr. Noble to avoid returning to work until after her surgery due to the risk of illness exposure. She recently saw her cardiologist, Chen, and Dr. Noble last Thursday.     She has been prescribed amlodipine for blood pressure management due to persistent hypertension.    She is on a weekly regimen of vitamin D and a daily dose of iron 324 mg. She received intravenous iron during her hospital stay in December 2024. She inquires about faster ways to replenish blood.    She has been diagnosed with fibroids and is considering a hysterectomy. Since mid-November 2024, she has experienced 6 menstrual cycles, each lasting 6 days with heavy bleeding on the second and third days.        Historical Information   Past Medical History:   Diagnosis Date    Anemia     Anxiety and depression     Fibroids     Gallstones     Hyperlipidemia     Hypertension     Hyperthyroidism determined by thyroid function test     Menorrhagia with regular cycle     Obesity     Reactive airway disease     Vitamin D deficiency       Past Surgical History:   Procedure Laterality Date     SECTION      X2    EGD AND COLONOSCOPY  2024    MAMMO (HISTORICAL)  2020    TUBAL LIGATION      pt had embedded IUD removed and tubal ligation performed     Social History   Social History     Substance and Sexual Activity   Alcohol Use Yes    Comment: occasional     Social History     Substance and Sexual Activity   Drug Use Never     Social History     Tobacco Use   Smoking Status Never    Passive exposure: Never   Smokeless Tobacco Never     Family History:   Family History   Problem Relation Age of Onset    Hyperthyroidism Mother     Thyroid disease unspecified Mother         thyroidectomy for hyperthyroidism    Early menopause Mother     Endometriosis Mother     Hypertension Mother     Hyperlipidemia Mother     Breast cancer Mother     Skin cancer Father     Stroke Father     Coronary artery disease Father     Hypertension Father     Hyperlipidemia Father     Pancreatic cancer Father     Hypertension Maternal Aunt     Hypertension Maternal Uncle     Thyroid disease unspecified Paternal Aunt         thyroidectomy    Hypertension Paternal Aunt     Hypertension Paternal Uncle     Hypothyroidism Maternal Grandmother     Colon cancer Maternal Grandmother     Hypertension Maternal Grandmother     Hypertension Maternal Grandfather     Hypertension Paternal Grandmother     Hypertension Paternal Grandfather     No Known Problems Daughter     No Known Problems Son     Thyroid disease unspecified Cousin         paternal cousin with thyroidectomy    Hypothyroidism Cousin         maternal cousin with hypothyroidism    Thyroid disease unspecified Other     Ovarian cancer Neg Hx     Uterine cancer Neg Hx        Meds/Allergies   Current Outpatient Medications   Medication Sig Dispense Refill    amLODIPine (NORVASC) 5 mg tablet Take 1 tablet (5 mg total) by mouth daily 30 tablet 1    co-enzyme Q-10 50 MG capsule Take 100 mg by mouth daily         "ergocalciferol (VITAMIN D2) 50,000 units Take 50,000 Units by mouth once a week Last taken 12/1      ferrous sulfate 325 (65 FE) MG EC tablet Take 325 mg by mouth 3 (three) times a day with meals      methimazole (TAPAZOLE) 10 mg tablet 2 in am and 1 in pm 120 tablet 6    Pepcid 40 MG tablet 40 mg daily at bedtime      pravastatin (PRAVACHOL) 20 mg tablet Take 1 tablet by mouth in the morning Takes in evening      propranolol (INDERAL LA) 160 mg Take 1 capsule (160 mg total) by mouth 2 (two) times a day 60 capsule 1    RABEprazole (ACIPHEX) 20 MG tablet Take 1 tablet (20 mg total) by mouth daily 30 tablet 2    saccharomyces boulardii (FLORASTOR) 250 mg capsule Take 250 mg by mouth in the morning      albuterol (VENTOLIN HFA) 90 mcg/act inhaler Inhale 2 puffs every 6 (six) hours as needed for wheezing (Patient not taking: Reported on 1/24/2025) 18 g 0     No current facility-administered medications for this visit.     Allergies   Allergen Reactions    Sulfa Antibiotics Anaphylaxis    Sulfamethoxazole-Trimethoprim Anaphylaxis    Mupirocin Hives     Other Reaction(s): swelling lips, flushed cheeks       Objective   Vitals: Blood pressure 128/80, pulse 85, height 4' 11\" (1.499 m), weight 104 kg (230 lb), SpO2 98%, not currently breastfeeding.  Invasive Devices       None                   Physical Exam    No lid lag, stare, proptosis, or periorbital edema in the eyes.  Thyroid remains enlarged in size with a goiter without bruits or nodules.  Lungs are clear to auscultation.  Heart has a regular rate and rhythm. No murmurs.  No tremor of the outstretched hands. Patellar deep tendon reflexes are normal.      The history was obtained from the review of the chart and from the patient.    Lab Results:      Blood work performed on 1/27/2025 showed a TSH of 0.045 with a free T 4 of 0.56 and a free T3 of 2.88.    CMP showed glucose of 104 random but was otherwise normal.    CBC was normal.  There was low and low MCV, MCH, and " MCHC.    CPK was 24.    Lab Results   Component Value Date    CREATININE 0.74 01/27/2025    CREATININE 0.51 (L) 12/28/2024    CREATININE 0.52 (L) 12/05/2024    BUN 9 01/27/2025     11/26/2015    K 5.1 01/27/2025     01/27/2025    CO2 27 01/27/2025     eGFR   Date Value Ref Range Status   01/27/2025 97 ml/min/1.73sq m Final         Lab Results   Component Value Date    ALT 25 01/27/2025    AST 21 01/27/2025    ALKPHOS 78 01/27/2025    BILITOT 0.61 11/26/2015       Lab Results   Component Value Date    FREET4 0.56 (L) 01/27/2025             Future Appointments   Date Time Provider Department Center   2/10/2025  8:00 AM DO BRAYAN Wade [facility]   3/11/2025  3:00 PM MD BRAYAN Cassidy [facility]   4/3/2025  9:00 AM Paty Ana M Triston, CRNP [facility]

## 2025-01-29 NOTE — PATIENT INSTRUCTIONS
MD please advise    Refill request for Methotrexate 2.5 mg  Last refilled: 05/10/16  Last Office Visit: 09/20/16  Next Office Visit: NONE    Labs: 03/17/16 ( CBC, CMP,CRP, Sed Rate)      1. Seropositive rheumatoid arthritis- stable. Patient would prefer to have his PCP manage his Rheumatoid Arthritis due to cost and travel issues. No further refills will be given from Rheumatology.   - Continue Methotrexate 15 mg (6 tabs) once weekly  - Continue Plaquenil 200 mg twice daily Monday-Thursday, 200 mg daily Friday-Sunday  - Continue Folic Acid 1 mg daily    The thyroid is improving.     Decrease the methimazole to 10 mg 2 in am and 1 in pm.    We will then repeat blood work in 6 weeks.     I will put in the referral to the surgeon for surgery so you can discuss this with him.     Follow up in 3-4 months.

## 2025-01-31 ENCOUNTER — TELEPHONE (OUTPATIENT)
Dept: ENDOCRINOLOGY | Facility: HOSPITAL | Age: 45
End: 2025-01-31

## 2025-01-31 ENCOUNTER — TELEPHONE (OUTPATIENT)
Age: 45
End: 2025-01-31

## 2025-01-31 NOTE — TELEPHONE ENCOUNTER
Left message for patient to call back and schedule follow up. Per Dr Judd she needs to be seen in 3 months and can be an add on.

## 2025-01-31 NOTE — TELEPHONE ENCOUNTER
Pt is now scheduled for 2/24/25 at 10 am with Dr. Vanegas at the [place] location. Pt only wants to see Dr. Vanegas and wanted his first available at any of his locations, which is the appt she is currently scheduled for. Pt has been scheduled out of referral guidelines of 7 days. Pt can be reached at [phone].

## 2025-02-05 ENCOUNTER — RESULTS FOLLOW-UP (OUTPATIENT)
Dept: OBGYN CLINIC | Facility: CLINIC | Age: 45
End: 2025-02-05

## 2025-02-05 ENCOUNTER — TELEPHONE (OUTPATIENT)
Dept: OBGYN CLINIC | Facility: CLINIC | Age: 45
End: 2025-02-05

## 2025-02-05 NOTE — TELEPHONE ENCOUNTER
Dicussed results with pt US done day 17 of cycle. US showed very small fibroid Suggestion of adenomyosis EMS 14 mm Heterogenous echo texture of the endometrium Recommend endometrial biopsy recommended to evaluate for atypia/hyperplasia. She is in process of getting thyroid removed She will call back to schedule bx Did not want to schedule now.  Period every 18-25 days  Since Nov has had 5 periods Discussed option of putting on POP vs mirena IUD She is declining Adjusting her thyroid meds and do not want to change too much Declines IUD Last one had to be removed surgically. Pt reported suggestion mall suspicion for pulm Emboli on eval in ER would want to avoid OCP She reports has had endo bx in past know how painful they are States she will call back to Schedule

## 2025-02-10 ENCOUNTER — OFFICE VISIT (OUTPATIENT)
Dept: GASTROENTEROLOGY | Facility: CLINIC | Age: 45
End: 2025-02-10
Payer: COMMERCIAL

## 2025-02-10 VITALS
HEIGHT: 59 IN | WEIGHT: 233 LBS | SYSTOLIC BLOOD PRESSURE: 142 MMHG | DIASTOLIC BLOOD PRESSURE: 88 MMHG | BODY MASS INDEX: 46.97 KG/M2

## 2025-02-10 DIAGNOSIS — Z86.0100 PERSONAL HISTORY OF COLON POLYPS, UNSPECIFIED: ICD-10-CM

## 2025-02-10 DIAGNOSIS — E05.90 HYPERTHYROIDISM: ICD-10-CM

## 2025-02-10 DIAGNOSIS — R42 LIGHTHEADED: ICD-10-CM

## 2025-02-10 DIAGNOSIS — K21.9 GASTROESOPHAGEAL REFLUX DISEASE WITHOUT ESOPHAGITIS: Primary | ICD-10-CM

## 2025-02-10 PROCEDURE — 99214 OFFICE O/P EST MOD 30 MIN: CPT | Performed by: STUDENT IN AN ORGANIZED HEALTH CARE EDUCATION/TRAINING PROGRAM

## 2025-02-10 NOTE — PROGRESS NOTES
Name: Chen Peace      : 1980      MRN: [id number]  Encounter Provider: Chico Padilla DO  Encounter Date: 2/10/2025   Encounter department: [facility]  :  Assessment & Plan  Gastroesophageal reflux disease without esophagitis    44-year-old female with history of heartburn with seeing improvement since treatment of her thyroid and being put on rabeprazole.    Given that the rabeprazole is doing the line sure of the work, she is okay to discontinue Pepcid at night to monitor her symptoms.  Should her reflux worsen she can go back on Pepcid.  Agree that I would keep her on rabeprazole until after her thyroid surgery.  She is  it from her other thyroid medication.    Once her surgery is complete and she is cleared by her other specialist and surgeon, she may wean off the rabeprazole.  We discussed theoretical risk of rebound symptoms which would only last a few days.  Should she have any refractory symptoms she can make an appointment to follow-up.       Personal history of colon polyps, unspecified  She had 2 small polyps removed, serrated changes noted.  5-year recall.       Hyperthyroidism  Ongoing follow-up per her endocrinologist and surgeon, potential thyroidectomy.       BMI 45.0-49.9, adult (HCC)  Would monitor after her surgery to see if this response to lifestyle modifications or if she would require referral to medical weight management.  Agree with previous documentation that if a bariatric surgery were to be considered in the future, would avoid gastric sleeve given her history of reflux symptoms.       Lightheaded  She feels a little bit lightheaded today but otherwise has no acute symptoms.  Uncertain if this is from a transient viral illness.  Advised to monitor symptoms and contact her provider should she develop any fevers, rapid heart rate, slow heart rate, worsening lightheadedness or other concerning features.           History of  Present Illness   EARNEST Siddiqui is a very pleasant 44-year-old female with past medical history of Graves' disease, complicated by thyroid storm back in December, heartburn, colon polyps who presents for follow-up after her upper endoscopy and colonoscopy on November 27, 2024.    She had been seen in the office for reflux symptoms and was set up for EGD and screening colonoscopy.  Her upper endoscopy was overall unremarkable.  Her colonoscopy was notable for 2 small polyps which were removed, 5-year recall.    She was in the hospital for a week in December for thyroid storm and was discharged on high-dose propranolol and methimazole after being seen by the endocrinology service.    She is planning to see surgery in February to discuss thyroidectomy.    In terms of her GI symptoms, she has no reflux symptoms on rabeprazole which she takes at lunchtime daily and Pepcid at nighttime.  No issues with bowel movements reported.  She is asking about coming off medications but was advised by her other doctors to not make any significant changes before her surgery.    She feels a little bit lightheaded and warm today and felt off since yesterday, but no report of fever, chills, nausea, vomiting, palpitations, rapid heart rate/bradycardia, sick contacts.      History obtained from: patient    Review of Systems  Current Outpatient Medications on File Prior to Visit   Medication Sig Dispense Refill    albuterol (VENTOLIN HFA) 90 mcg/act inhaler Inhale 2 puffs every 6 (six) hours as needed for wheezing 18 g 0    amLODIPine (NORVASC) 5 mg tablet Take 1 tablet (5 mg total) by mouth daily 30 tablet 1    co-enzyme Q-10 50 MG capsule Take 100 mg by mouth daily        ergocalciferol (VITAMIN D2) 50,000 units Take 50,000 Units by mouth once a week Last taken 12/1      ferrous sulfate 325 (65 FE) MG EC tablet Take 325 mg by mouth 3 (three) times a day with meals (Patient taking differently: Take 325 mg by mouth in the morning)       "methimazole (TAPAZOLE) 10 mg tablet 2 in am and 1 in pm 120 tablet 6    Pepcid 40 MG tablet 40 mg daily at bedtime      pravastatin (PRAVACHOL) 20 mg tablet Take 1 tablet by mouth in the morning Takes in evening      propranolol (INDERAL LA) 160 mg Take 1 capsule (160 mg total) by mouth 2 (two) times a day 60 capsule 1    RABEprazole (ACIPHEX) 20 MG tablet Take 1 tablet (20 mg total) by mouth daily 30 tablet 2    saccharomyces boulardii (FLORASTOR) 250 mg capsule Take 250 mg by mouth in the morning       No current facility-administered medications on file prior to visit.      Social History     Tobacco Use    Smoking status: Never     Passive exposure: Never    Smokeless tobacco: Never   Vaping Use    Vaping status: Never Used   Substance and Sexual Activity    Alcohol use: Yes     Comment: occasional    Drug use: Never    Sexual activity: Yes     Partners: Male     Birth control/protection: Female Sterilization        Objective   /88   Ht 4' 11\" (1.499 m)   Wt 106 kg (233 lb)   BMI 47.06 kg/m²      Physical Exam    General Appearance: NAD, cooperative, alert  Eyes: Anicteric  GI:  Soft, non-tender, non-distended; normal bowel sounds; no masses, no organomegaly   Rectal: Deferred  Musculoskeletal: No edema.  Skin:     No jaundice    "

## 2025-02-10 NOTE — PATIENT INSTRUCTIONS
"1.  I am okay with you weaning off the Pepcid as the rabeprazole is likely doing most of the work as far as her reflux symptoms.  If your symptoms come back you can certainly restart the Pepcid at nighttime.  Once you get your surgery and have the clear from your endocrinologist, surgeon and family doctor I am okay with you weaning off rabeprazole overall and monitoring your symptoms.  You can keep some Tums or Pepcid on hand in case you have any \"rebound \"reflux for a few days after stopping it.  Should you have ongoing reflux symptoms despite stopping this medication we can see you back in the office.  "

## 2025-02-10 NOTE — ASSESSMENT & PLAN NOTE
Would monitor after her surgery to see if this response to lifestyle modifications or if she would require referral to medical weight management.  Agree with previous documentation that if a bariatric surgery were to be considered in the future, would avoid gastric sleeve given her history of reflux symptoms.

## 2025-02-10 NOTE — ASSESSMENT & PLAN NOTE
44-year-old female with history of heartburn with seeing improvement since treatment of her thyroid and being put on rabeprazole.    Given that the rabeprazole is doing the line sure of the work, she is okay to discontinue Pepcid at night to monitor her symptoms.  Should her reflux worsen she can go back on Pepcid.  Agree that I would keep her on rabeprazole until after her thyroid surgery.  She is  it from her other thyroid medication.    Once her surgery is complete and she is cleared by her other specialist and surgeon, she may wean off the rabeprazole.  We discussed theoretical risk of rebound symptoms which would only last a few days.  Should she have any refractory symptoms she can make an appointment to follow-up.

## 2025-02-13 DIAGNOSIS — K21.9 GASTROESOPHAGEAL REFLUX DISEASE, UNSPECIFIED WHETHER ESOPHAGITIS PRESENT: ICD-10-CM

## 2025-02-13 RX ORDER — RABEPRAZOLE SODIUM 20 MG/1
20 TABLET, DELAYED RELEASE ORAL DAILY
Qty: 30 TABLET | Refills: 5 | Status: SHIPPED | OUTPATIENT
Start: 2025-02-13

## 2025-02-14 DIAGNOSIS — E05.00 GRAVES DISEASE: ICD-10-CM

## 2025-02-14 NOTE — TELEPHONE ENCOUNTER
Requested medication(s) are due for refill today: Yes  Patient has already received a courtesy refill: No  Other reason request has been forwarded to provider: (Per pod- review if appropriate) Per office visit note 1/24/25:  Tachycardia/palpitations: Treated with propanolol 160 mg, patient reporting less palpations than previous.  Continue with propranolol.

## 2025-02-18 RX ORDER — PROPRANOLOL HYDROCHLORIDE 160 MG/1
160 CAPSULE, EXTENDED RELEASE ORAL 2 TIMES DAILY
Qty: 60 CAPSULE | Refills: 3 | Status: SHIPPED | OUTPATIENT
Start: 2025-02-18

## 2025-02-24 ENCOUNTER — PREP FOR PROCEDURE (OUTPATIENT)
Dept: SURGICAL ONCOLOGY | Facility: CLINIC | Age: 45
End: 2025-02-24

## 2025-02-24 ENCOUNTER — CONSULT (OUTPATIENT)
Dept: SURGICAL ONCOLOGY | Facility: CLINIC | Age: 45
End: 2025-02-24
Payer: COMMERCIAL

## 2025-02-24 VITALS
TEMPERATURE: 99.2 F | HEIGHT: 59 IN | SYSTOLIC BLOOD PRESSURE: 128 MMHG | WEIGHT: 233 LBS | OXYGEN SATURATION: 99 % | RESPIRATION RATE: 18 BRPM | BODY MASS INDEX: 46.97 KG/M2 | HEART RATE: 59 BPM | DIASTOLIC BLOOD PRESSURE: 82 MMHG

## 2025-02-24 DIAGNOSIS — E05.90 HYPERTHYROIDISM: ICD-10-CM

## 2025-02-24 DIAGNOSIS — E05.00 GRAVES DISEASE: Primary | ICD-10-CM

## 2025-02-24 PROCEDURE — 99204 OFFICE O/P NEW MOD 45 MIN: CPT | Performed by: SURGERY

## 2025-02-24 RX ORDER — CEFAZOLIN SODIUM 2 G/50ML
2000 SOLUTION INTRAVENOUS ONCE
Status: CANCELLED | OUTPATIENT
Start: 2025-02-24 | End: 2025-02-24

## 2025-02-24 RX ORDER — TRAMADOL HYDROCHLORIDE 50 MG/1
50 TABLET ORAL EVERY 6 HOURS PRN
Qty: 10 TABLET | Refills: 0 | Status: SHIPPED | OUTPATIENT
Start: 2025-02-24

## 2025-02-24 RX ORDER — LEVOTHYROXINE SODIUM 137 UG/1
137 TABLET ORAL DAILY
Qty: 30 TABLET | Refills: 3 | Status: SHIPPED | OUTPATIENT
Start: 2025-02-24

## 2025-02-24 NOTE — H&P (VIEW-ONLY)
Surgical Oncology Consult       [facility] [place]  [address] [facility] [address]  [phone]    Chen Peace  1980  [id number]  [facility] [place]  [address] [facility] [address]  [phone]    Chief Complaint   Patient presents with    Consult       Assessment/Plan:    No problem-specific Assessment & Plan notes found for this encounter.       Diagnoses and all orders for this visit:    Graves disease  -     Ambulatory Referral to Surgical Oncology    Hyperthyroidism  -     Ambulatory Referral to Surgical Oncology      Advance Care Planning/Advance Directives:  Discussed disease status, cancer treatment plans and/or cancer treatment goals with the patient.     Oncology History    No history exists.       History of Present Illness: 44-year-old woman who recently underwent endoscopy and unabatedly precipitated thyroid storm.  She was hospitalized and worked up for this.  She has been started on methimazole and beta-blocker but is now referred to discuss surgical intervention.  No prior history of thyroid issues up to this point.  No history radiation exposure to the head or neck area.  No difficulty swallowing or breathing.    Review of Systems   Constitutional:  Positive for fatigue.   HENT: Negative.     Eyes: Negative.    Respiratory: Negative.     Cardiovascular:  Positive for palpitations.   Gastrointestinal: Negative.         Gastritits   Endocrine: Negative.    Genitourinary: Negative.    Musculoskeletal: Negative.    Skin: Negative.    Allergic/Immunologic: Negative.    Neurological: Negative.    Hematological: Negative.    Psychiatric/Behavioral:  The patient is hyperactive.    All other systems reviewed and are negative.        Patient Active Problem List   Diagnosis    Gastroesophageal reflux disease    BMI 45.0-49.9, adult (HCC)    Low  TSH level    Tachycardia    Syncope    Essential hypertension    Morbid obesity (HCC)    Hyperthyroidism    Graves disease     Past Medical History:   Diagnosis Date    Anemia     Anxiety and depression     Fibroids     Gallstones     Hyperlipidemia     Hypertension     Hyperthyroidism determined by thyroid function test     Menorrhagia with regular cycle     Obesity     Reactive airway disease     Vitamin D deficiency      Past Surgical History:   Procedure Laterality Date     SECTION      X2    EGD AND COLONOSCOPY  2024    MAMMO (HISTORICAL)  2020    TUBAL LIGATION      pt had embedded IUD removed and tubal ligation performed     Family History   Problem Relation Age of Onset    Hyperthyroidism Mother     Thyroid disease unspecified Mother         thyroidectomy for hyperthyroidism    Early menopause Mother     Endometriosis Mother     Hypertension Mother     Hyperlipidemia Mother     Breast cancer Mother     Skin cancer Father     Stroke Father     Coronary artery disease Father     Hypertension Father     Hyperlipidemia Father     Pancreatic cancer Father     Hypertension Maternal Aunt     Hypertension Maternal Uncle     Thyroid disease unspecified Paternal Aunt         thyroidectomy    Hypertension Paternal Aunt     Hypertension Paternal Uncle     Hypothyroidism Maternal Grandmother     Colon cancer Maternal Grandmother     Hypertension Maternal Grandmother     Hypertension Maternal Grandfather     Hypertension Paternal Grandmother     Hypertension Paternal Grandfather     No Known Problems Daughter     No Known Problems Son     Thyroid disease unspecified Cousin         paternal cousin with thyroidectomy    Hypothyroidism Cousin         maternal cousin with hypothyroidism    Thyroid disease unspecified Other     Ovarian cancer Neg Hx     Uterine cancer Neg Hx      Social History     Socioeconomic History    Marital status: /Civil Union     Spouse name: Not on file    Number of  children: Not on file    Years of education: Not on file    Highest education level: Not on file   Occupational History    Not on file   Tobacco Use    Smoking status: Never     Passive exposure: Never    Smokeless tobacco: Never   Vaping Use    Vaping status: Never Used   Substance and Sexual Activity    Alcohol use: Yes     Comment: occasional    Drug use: Never    Sexual activity: Yes     Partners: Male     Birth control/protection: Female Sterilization   Other Topics Concern    Not on file   Social History Narrative    Not on file     Social Drivers of Health     Financial Resource Strain: Not on file   Food Insecurity: No Food Insecurity (2024)    Nursing - Inadequate Food Risk Classification     Worried About Running Out of Food in the Last Year: Not on file     Ran Out of Food in the Last Year: Not on file     Ran Out of Food in the Last Year: Never true   Transportation Needs: No Transportation Needs (2024)    Nursing - Transportation Risk Classification     Lack of Transportation: Not on file     Lack of Transportation: No   Physical Activity: Not on file   Stress: Not on file   Social Connections: Not on file   Intimate Partner Violence: Unknown (2024)    Nursing IPS     Feels Physically and Emotionally Safe: Not on file     Physically Hurt by Someone: Not on file     Humiliated or Emotionally Abused by Someone: Not on file     Physically Hurt by Someone: No     Hurt or Threatened by Someone: No   Housing Stability: Unknown (2024)    Nursing: Inadequate Housing Risk Classification     Has Housing: Not on file     Worried About Losing Housing: Not on file     Unable to Get Utilities: Not on file     Unable to Pay for Housing in the Last Year: No     Has Housin       Current Outpatient Medications:     albuterol (VENTOLIN HFA) 90 mcg/act inhaler, Inhale 2 puffs every 6 (six) hours as needed for wheezing, Disp: 18 g, Rfl: 0    amLODIPine (NORVASC) 5 mg tablet, Take 1 tablet (5 mg  total) by mouth daily, Disp: 30 tablet, Rfl: 1    co-enzyme Q-10 50 MG capsule, Take 100 mg by mouth daily  , Disp: , Rfl:     ergocalciferol (VITAMIN D2) 50,000 units, Take 50,000 Units by mouth once a week Last taken 12/1, Disp: , Rfl:     ferrous sulfate 325 (65 FE) MG EC tablet, Take 325 mg by mouth 3 (three) times a day with meals (Patient taking differently: Take 325 mg by mouth in the morning), Disp: , Rfl:     methimazole (TAPAZOLE) 10 mg tablet, 2 in am and 1 in pm, Disp: 120 tablet, Rfl: 6    Pepcid 40 MG tablet, 40 mg daily at bedtime, Disp: , Rfl:     pravastatin (PRAVACHOL) 20 mg tablet, Take 1 tablet by mouth in the morning Takes in evening, Disp: , Rfl:     propranolol (INDERAL LA) 160 mg, Take 1 capsule (160 mg total) by mouth 2 (two) times a day, Disp: 60 capsule, Rfl: 3    RABEprazole (ACIPHEX) 20 MG tablet, TAKE 1 TABLET BY MOUTH DAILY, Disp: 30 tablet, Rfl: 5    saccharomyces boulardii (FLORASTOR) 250 mg capsule, Take 250 mg by mouth in the morning, Disp: , Rfl:   Allergies   Allergen Reactions    Sulfa Antibiotics Anaphylaxis    Sulfamethoxazole-Trimethoprim Anaphylaxis    Mupirocin Hives     Other Reaction(s): swelling lips, flushed cheeks     Vitals:    02/24/25 0956   BP: 128/82   Pulse: 59   Resp: 18   Temp: 99.2 °F (37.3 °C)   SpO2: 99%       Physical Exam  Vitals reviewed.   Constitutional:       Appearance: Normal appearance.   HENT:      Head: Normocephalic and atraumatic.      Nose: Nose normal.   Eyes:      Pupils: Pupils are equal, round, and reactive to light.   Cardiovascular:      Rate and Rhythm: Normal rate and regular rhythm.      Heart sounds: Normal heart sounds.   Pulmonary:      Effort: Pulmonary effort is normal.   Abdominal:      General: Abdomen is flat.      Palpations: Abdomen is soft.   Musculoskeletal:         General: Normal range of motion.      Cervical back: Normal range of motion and neck supple. No rigidity.   Lymphadenopathy:      Cervical: No cervical  adenopathy.   Skin:     General: Skin is warm and dry.   Neurological:      General: No focal deficit present.      Mental Status: She is alert and oriented to person, place, and time.   Psychiatric:         Mood and Affect: Mood normal.         Behavior: Behavior normal.         Thought Content: Thought content normal.         Judgment: Judgment normal.         Pathology:  none    Labs:  Lab Results   Component Value Date    DAY1GBDLWCMJ 0.045 (L) 01/27/2025    U9RODNE 3.1 (H) 12/03/2024         Imaging  AMB event recorder  Addendum Date: 2/4/2025  Addendum: Patient had a min HR of 54 bpm, max HR of 121 bpm, and avg HR of 85 bpm. Predominant underlying rhythm was Sinus Rhythm. Slight P wave morphology changes were noted. Isolated SVEs were rare (<1.0%), SVE Couplets were rare (<1.0%), and SVE Triplets were rare (<1.0%). Isolated VEs were rare (<1.0%), VE Couplets were rare (<1.0%), and no VE Triplets were present. Ventricular Bigeminy and Trigeminy were present. Impression: Predominantly normal sinus rhythm with normal heart rate variation.  Average heart rate of 85 bpm.  No sustained arrhythmias were noted No evidence of atrial fibrillation.  No high degree AV block or pauses were seen.  Overall low ectopy burden.  Symptoms appear to correlate with single PVCs, rarely with single PACs.    I reviewed the above laboratory and imaging data.    Discussion/Summary: 44-year-old woman, Graves' disease, thyroid storm.  Plan for total thyroidectomy.  Rationale for this along risk and benefits of surgery including infection, bleeding, hypocalcemia, nerve injury, discussed but all questions answered and consent signed at this visit.  Will order preoperative imaging.  Start Synthroid after surgery.  Beta-blocker wean per endocrinology team.

## 2025-02-24 NOTE — PROGRESS NOTES
Surgical Oncology Consult       [facility] [place]  [address] [facility] [address]  [phone]    Chen Peace  1980  [id number]  [facility] [place]  [address] [facility] [address]  [phone]    Chief Complaint   Patient presents with    Consult       Assessment/Plan:    No problem-specific Assessment & Plan notes found for this encounter.       Diagnoses and all orders for this visit:    Graves disease  -     Ambulatory Referral to Surgical Oncology    Hyperthyroidism  -     Ambulatory Referral to Surgical Oncology      Advance Care Planning/Advance Directives:  Discussed disease status, cancer treatment plans and/or cancer treatment goals with the patient.     Oncology History    No history exists.       History of Present Illness: 44-year-old woman who recently underwent endoscopy and unabatedly precipitated thyroid storm.  She was hospitalized and worked up for this.  She has been started on methimazole and beta-blocker but is now referred to discuss surgical intervention.  No prior history of thyroid issues up to this point.  No history radiation exposure to the head or neck area.  No difficulty swallowing or breathing.    Review of Systems   Constitutional:  Positive for fatigue.   HENT: Negative.     Eyes: Negative.    Respiratory: Negative.     Cardiovascular:  Positive for palpitations.   Gastrointestinal: Negative.         Gastritits   Endocrine: Negative.    Genitourinary: Negative.    Musculoskeletal: Negative.    Skin: Negative.    Allergic/Immunologic: Negative.    Neurological: Negative.    Hematological: Negative.    Psychiatric/Behavioral:  The patient is hyperactive.    All other systems reviewed and are negative.        Patient Active Problem List   Diagnosis    Gastroesophageal reflux disease    BMI 45.0-49.9, adult (HCC)    Low  TSH level    Tachycardia    Syncope    Essential hypertension    Morbid obesity (HCC)    Hyperthyroidism    Graves disease     Past Medical History:   Diagnosis Date    Anemia     Anxiety and depression     Fibroids     Gallstones     Hyperlipidemia     Hypertension     Hyperthyroidism determined by thyroid function test     Menorrhagia with regular cycle     Obesity     Reactive airway disease     Vitamin D deficiency      Past Surgical History:   Procedure Laterality Date     SECTION      X2    EGD AND COLONOSCOPY  2024    MAMMO (HISTORICAL)  2020    TUBAL LIGATION      pt had embedded IUD removed and tubal ligation performed     Family History   Problem Relation Age of Onset    Hyperthyroidism Mother     Thyroid disease unspecified Mother         thyroidectomy for hyperthyroidism    Early menopause Mother     Endometriosis Mother     Hypertension Mother     Hyperlipidemia Mother     Breast cancer Mother     Skin cancer Father     Stroke Father     Coronary artery disease Father     Hypertension Father     Hyperlipidemia Father     Pancreatic cancer Father     Hypertension Maternal Aunt     Hypertension Maternal Uncle     Thyroid disease unspecified Paternal Aunt         thyroidectomy    Hypertension Paternal Aunt     Hypertension Paternal Uncle     Hypothyroidism Maternal Grandmother     Colon cancer Maternal Grandmother     Hypertension Maternal Grandmother     Hypertension Maternal Grandfather     Hypertension Paternal Grandmother     Hypertension Paternal Grandfather     No Known Problems Daughter     No Known Problems Son     Thyroid disease unspecified Cousin         paternal cousin with thyroidectomy    Hypothyroidism Cousin         maternal cousin with hypothyroidism    Thyroid disease unspecified Other     Ovarian cancer Neg Hx     Uterine cancer Neg Hx      Social History     Socioeconomic History    Marital status: /Civil Union     Spouse name: Not on file    Number of  children: Not on file    Years of education: Not on file    Highest education level: Not on file   Occupational History    Not on file   Tobacco Use    Smoking status: Never     Passive exposure: Never    Smokeless tobacco: Never   Vaping Use    Vaping status: Never Used   Substance and Sexual Activity    Alcohol use: Yes     Comment: occasional    Drug use: Never    Sexual activity: Yes     Partners: Male     Birth control/protection: Female Sterilization   Other Topics Concern    Not on file   Social History Narrative    Not on file     Social Drivers of Health     Financial Resource Strain: Not on file   Food Insecurity: No Food Insecurity (2024)    Nursing - Inadequate Food Risk Classification     Worried About Running Out of Food in the Last Year: Not on file     Ran Out of Food in the Last Year: Not on file     Ran Out of Food in the Last Year: Never true   Transportation Needs: No Transportation Needs (2024)    Nursing - Transportation Risk Classification     Lack of Transportation: Not on file     Lack of Transportation: No   Physical Activity: Not on file   Stress: Not on file   Social Connections: Not on file   Intimate Partner Violence: Unknown (2024)    Nursing IPS     Feels Physically and Emotionally Safe: Not on file     Physically Hurt by Someone: Not on file     Humiliated or Emotionally Abused by Someone: Not on file     Physically Hurt by Someone: No     Hurt or Threatened by Someone: No   Housing Stability: Unknown (2024)    Nursing: Inadequate Housing Risk Classification     Has Housing: Not on file     Worried About Losing Housing: Not on file     Unable to Get Utilities: Not on file     Unable to Pay for Housing in the Last Year: No     Has Housin       Current Outpatient Medications:     albuterol (VENTOLIN HFA) 90 mcg/act inhaler, Inhale 2 puffs every 6 (six) hours as needed for wheezing, Disp: 18 g, Rfl: 0    amLODIPine (NORVASC) 5 mg tablet, Take 1 tablet (5 mg  total) by mouth daily, Disp: 30 tablet, Rfl: 1    co-enzyme Q-10 50 MG capsule, Take 100 mg by mouth daily  , Disp: , Rfl:     ergocalciferol (VITAMIN D2) 50,000 units, Take 50,000 Units by mouth once a week Last taken 12/1, Disp: , Rfl:     ferrous sulfate 325 (65 FE) MG EC tablet, Take 325 mg by mouth 3 (three) times a day with meals (Patient taking differently: Take 325 mg by mouth in the morning), Disp: , Rfl:     methimazole (TAPAZOLE) 10 mg tablet, 2 in am and 1 in pm, Disp: 120 tablet, Rfl: 6    Pepcid 40 MG tablet, 40 mg daily at bedtime, Disp: , Rfl:     pravastatin (PRAVACHOL) 20 mg tablet, Take 1 tablet by mouth in the morning Takes in evening, Disp: , Rfl:     propranolol (INDERAL LA) 160 mg, Take 1 capsule (160 mg total) by mouth 2 (two) times a day, Disp: 60 capsule, Rfl: 3    RABEprazole (ACIPHEX) 20 MG tablet, TAKE 1 TABLET BY MOUTH DAILY, Disp: 30 tablet, Rfl: 5    saccharomyces boulardii (FLORASTOR) 250 mg capsule, Take 250 mg by mouth in the morning, Disp: , Rfl:   Allergies   Allergen Reactions    Sulfa Antibiotics Anaphylaxis    Sulfamethoxazole-Trimethoprim Anaphylaxis    Mupirocin Hives     Other Reaction(s): swelling lips, flushed cheeks     Vitals:    02/24/25 0956   BP: 128/82   Pulse: 59   Resp: 18   Temp: 99.2 °F (37.3 °C)   SpO2: 99%       Physical Exam  Vitals reviewed.   Constitutional:       Appearance: Normal appearance.   HENT:      Head: Normocephalic and atraumatic.      Nose: Nose normal.   Eyes:      Pupils: Pupils are equal, round, and reactive to light.   Cardiovascular:      Rate and Rhythm: Normal rate and regular rhythm.      Heart sounds: Normal heart sounds.   Pulmonary:      Effort: Pulmonary effort is normal.   Abdominal:      General: Abdomen is flat.      Palpations: Abdomen is soft.   Musculoskeletal:         General: Normal range of motion.      Cervical back: Normal range of motion and neck supple. No rigidity.   Lymphadenopathy:      Cervical: No cervical  adenopathy.   Skin:     General: Skin is warm and dry.   Neurological:      General: No focal deficit present.      Mental Status: She is alert and oriented to person, place, and time.   Psychiatric:         Mood and Affect: Mood normal.         Behavior: Behavior normal.         Thought Content: Thought content normal.         Judgment: Judgment normal.         Pathology:  none    Labs:  Lab Results   Component Value Date    FGD4BQZRNUXD 0.045 (L) 01/27/2025    F2JSZAX 3.1 (H) 12/03/2024         Imaging  AMB event recorder  Addendum Date: 2/4/2025  Addendum: Patient had a min HR of 54 bpm, max HR of 121 bpm, and avg HR of 85 bpm. Predominant underlying rhythm was Sinus Rhythm. Slight P wave morphology changes were noted. Isolated SVEs were rare (<1.0%), SVE Couplets were rare (<1.0%), and SVE Triplets were rare (<1.0%). Isolated VEs were rare (<1.0%), VE Couplets were rare (<1.0%), and no VE Triplets were present. Ventricular Bigeminy and Trigeminy were present. Impression: Predominantly normal sinus rhythm with normal heart rate variation.  Average heart rate of 85 bpm.  No sustained arrhythmias were noted No evidence of atrial fibrillation.  No high degree AV block or pauses were seen.  Overall low ectopy burden.  Symptoms appear to correlate with single PVCs, rarely with single PACs.    I reviewed the above laboratory and imaging data.    Discussion/Summary: 44-year-old woman, Graves' disease, thyroid storm.  Plan for total thyroidectomy.  Rationale for this along risk and benefits of surgery including infection, bleeding, hypocalcemia, nerve injury, discussed but all questions answered and consent signed at this visit.  Will order preoperative imaging.  Start Synthroid after surgery.  Beta-blocker wean per endocrinology team.

## 2025-02-28 ENCOUNTER — TELEPHONE (OUTPATIENT)
Age: 45
End: 2025-02-28

## 2025-02-28 ENCOUNTER — ANESTHESIA EVENT (OUTPATIENT)
Dept: PERIOP | Facility: HOSPITAL | Age: 45
End: 2025-02-28
Payer: COMMERCIAL

## 2025-02-28 NOTE — TELEPHONE ENCOUNTER
Yes, take the methimazole the morning of surgery. Then no more after. She should be seen within 1-2 weeks post surgery. We can add her on to schedule, I will be opening a day in march.

## 2025-02-28 NOTE — PRE-PROCEDURE INSTRUCTIONS
Pre-Surgery Instructions:   Medication Instructions    amLODIPine (NORVASC) 5 mg tablet Take day of surgery.    co-enzyme Q-10 50 MG capsule Stop taking 7 days prior to surgery.    ergocalciferol (VITAMIN D2) 50,000 units Hold day of surgery.    ferrous sulfate 325 (65 FE) MG EC tablet Hold day of surgery.    methimazole (TAPAZOLE) 10 mg tablet Instructions provided by MD    Pepcid 40 MG tablet Take night before surgery    pravastatin (PRAVACHOL) 20 mg tablet Take night before surgery    propranolol (INDERAL LA) 160 mg Take day of surgery. If home at 1200    RABEprazole (ACIPHEX) 20 MG tablet Take day of surgery. If home at 1200    saccharomyces boulardii (FLORASTOR) 250 mg capsule Hold day of surgery.   Medication instructions for day of surgery reviewed. Please take all instructed medications with only a sip of water.       You will receive a call one business day prior to surgery with an arrival time and hospital directions. If your surgery is scheduled on a Monday, the hospital will be calling you on the Friday prior to your surgery. If you have not heard from anyone by 8pm, please call the hospital supervisor through the hospital  at [phone]. ([employer] [place] [phone] or [place] [phone]).    Do not eat or drink anything after midnight the night before your surgery, including candy, mints, lifesavers, or chewing gum. Do not drink alcohol 24hrs before your surgery. Try not to smoke at least 24hrs before your surgery.       Follow the pre surgery showering instructions as listed in the “My Surgical Experience Booklet” or otherwise provided by your surgeon's office. Do not use a blade to shave the surgical area 1 week before surgery. It is okay to use a clean electric clippers up to 24 hours before surgery. Do not apply any lotions, creams, including makeup, cologne, deodorant, or perfumes after showering on the day of your surgery. Do not use dry shampoo, hair spray, hair gel, or any type of hair  products.     No contact lenses, eye make-up, or artificial eyelashes. Remove nail polish, including gel polish, and any artificial, gel, or acrylic nails if possible. Remove all jewelry including rings and body piercing jewelry.     Wear causal clothing that is easy to take on and off. Consider your type of surgery.    Keep any valuables, jewelry, piercings at home. Please bring any specially ordered equipment (sling, braces) if indicated.    Arrange for a responsible person to drive you to and from the hospital on the day of your surgery. Please confirm the visitor policy for the day of your procedure when you receive your phone call with an arrival time.     Call the surgeon's office with any new illnesses, exposures, or additional questions prior to surgery.    Please reference your “My Surgical Experience Booklet” for additional information to prepare for your upcoming surgery.

## 2025-02-28 NOTE — TELEPHONE ENCOUNTER
Patient calling in reporting she is going for a total thyroidectomy 3/10/25. She was advised to contact endo to clarify if it is ok for patient to take Methimazole 10mg the day of her surgery. Patient is also asking when she should be seen post thyroidectomy for follow up. Please advise, patient is requesting a call back. Thank you.

## 2025-03-04 ENCOUNTER — HOSPITAL ENCOUNTER (OUTPATIENT)
Dept: ULTRASOUND IMAGING | Facility: CLINIC | Age: 45
Discharge: HOME | End: 2025-03-04
Payer: COMMERCIAL

## 2025-03-04 DIAGNOSIS — E05.00 GRAVES DISEASE: ICD-10-CM

## 2025-03-04 PROCEDURE — 76536 US EXAM OF HEAD AND NECK: CPT

## 2025-03-10 ENCOUNTER — HOSPITAL ENCOUNTER (OUTPATIENT)
Facility: HOSPITAL | Age: 45
Setting detail: OUTPATIENT SURGERY
Discharge: HOME | End: 2025-03-11
Attending: SURGERY | Admitting: SURGERY
Payer: COMMERCIAL

## 2025-03-10 ENCOUNTER — ANESTHESIA (OUTPATIENT)
Dept: PERIOP | Facility: HOSPITAL | Age: 45
End: 2025-03-10
Payer: COMMERCIAL

## 2025-03-10 DIAGNOSIS — E05.00 GRAVES DISEASE: ICD-10-CM

## 2025-03-10 DIAGNOSIS — E05.90 HYPERTHYROIDISM: ICD-10-CM

## 2025-03-10 LAB
CALCIUM SERPL-MCNC: 9.1 MG/DL (ref 8.4–10.2)
EXT PREGNANCY TEST URINE: NEGATIVE
EXT. CONTROL: NORMAL
PTH-INTACT P EXCISION SERPL-MCNC: 26.6 PG/ML (ref 12–88)

## 2025-03-10 PROCEDURE — 60240 REMOVAL OF THYROID: CPT | Performed by: SURGERY

## 2025-03-10 PROCEDURE — 88342 IMHCHEM/IMCYTCHM 1ST ANTB: CPT | Performed by: STUDENT IN AN ORGANIZED HEALTH CARE EDUCATION/TRAINING PROGRAM

## 2025-03-10 PROCEDURE — 88307 TISSUE EXAM BY PATHOLOGIST: CPT | Performed by: STUDENT IN AN ORGANIZED HEALTH CARE EDUCATION/TRAINING PROGRAM

## 2025-03-10 PROCEDURE — 81025 URINE PREGNANCY TEST: CPT | Performed by: SURGERY

## 2025-03-10 PROCEDURE — 83970 ASSAY OF PARATHORMONE: CPT | Performed by: SURGERY

## 2025-03-10 RX ORDER — PROMETHAZINE HYDROCHLORIDE 25 MG/ML
12.5 INJECTION, SOLUTION INTRAMUSCULAR; INTRAVENOUS ONCE AS NEEDED
Status: DISCONTINUED | OUTPATIENT
Start: 2025-03-10 | End: 2025-03-10 | Stop reason: HOSPADM

## 2025-03-10 RX ORDER — FENTANYL CITRATE/PF 50 MCG/ML
25 SYRINGE (ML) INJECTION
Status: DISCONTINUED | OUTPATIENT
Start: 2025-03-10 | End: 2025-03-10 | Stop reason: HOSPADM

## 2025-03-10 RX ORDER — FENTANYL CITRATE 50 UG/ML
INJECTION, SOLUTION INTRAMUSCULAR; INTRAVENOUS AS NEEDED
Status: DISCONTINUED | OUTPATIENT
Start: 2025-03-10 | End: 2025-03-10

## 2025-03-10 RX ORDER — ROCURONIUM BROMIDE 10 MG/ML
INJECTION, SOLUTION INTRAVENOUS AS NEEDED
Status: DISCONTINUED | OUTPATIENT
Start: 2025-03-10 | End: 2025-03-10

## 2025-03-10 RX ORDER — OXYCODONE HYDROCHLORIDE 5 MG/1
5 TABLET ORAL EVERY 4 HOURS PRN
Refills: 0 | Status: DISCONTINUED | OUTPATIENT
Start: 2025-03-10 | End: 2025-03-11 | Stop reason: HOSPADM

## 2025-03-10 RX ORDER — PROPOFOL 10 MG/ML
INJECTION, EMULSION INTRAVENOUS AS NEEDED
Status: DISCONTINUED | OUTPATIENT
Start: 2025-03-10 | End: 2025-03-10

## 2025-03-10 RX ORDER — ONDANSETRON 2 MG/ML
4 INJECTION INTRAMUSCULAR; INTRAVENOUS EVERY 6 HOURS PRN
Status: DISCONTINUED | OUTPATIENT
Start: 2025-03-10 | End: 2025-03-11 | Stop reason: HOSPADM

## 2025-03-10 RX ORDER — ACETAMINOPHEN 10 MG/ML
1000 INJECTION, SOLUTION INTRAVENOUS ONCE
Status: DISCONTINUED | OUTPATIENT
Start: 2025-03-10 | End: 2025-03-11 | Stop reason: HOSPADM

## 2025-03-10 RX ORDER — DEXAMETHASONE SODIUM PHOSPHATE 10 MG/ML
INJECTION, SOLUTION INTRAMUSCULAR; INTRAVENOUS AS NEEDED
Status: DISCONTINUED | OUTPATIENT
Start: 2025-03-10 | End: 2025-03-10

## 2025-03-10 RX ORDER — KETOROLAC TROMETHAMINE 30 MG/ML
INJECTION, SOLUTION INTRAMUSCULAR; INTRAVENOUS AS NEEDED
Status: DISCONTINUED | OUTPATIENT
Start: 2025-03-10 | End: 2025-03-10

## 2025-03-10 RX ORDER — HYDROMORPHONE HCL/PF 1 MG/ML
0.5 SYRINGE (ML) INJECTION
Status: DISCONTINUED | OUTPATIENT
Start: 2025-03-10 | End: 2025-03-10 | Stop reason: HOSPADM

## 2025-03-10 RX ORDER — OXYCODONE HYDROCHLORIDE 10 MG/1
10 TABLET ORAL EVERY 4 HOURS PRN
Refills: 0 | Status: DISCONTINUED | OUTPATIENT
Start: 2025-03-10 | End: 2025-03-11 | Stop reason: HOSPADM

## 2025-03-10 RX ORDER — LIDOCAINE HYDROCHLORIDE 10 MG/ML
INJECTION, SOLUTION EPIDURAL; INFILTRATION; INTRACAUDAL; PERINEURAL AS NEEDED
Status: DISCONTINUED | OUTPATIENT
Start: 2025-03-10 | End: 2025-03-10

## 2025-03-10 RX ORDER — ONDANSETRON 2 MG/ML
4 INJECTION INTRAMUSCULAR; INTRAVENOUS ONCE AS NEEDED
Status: COMPLETED | OUTPATIENT
Start: 2025-03-10 | End: 2025-03-10

## 2025-03-10 RX ORDER — SODIUM CHLORIDE, SODIUM LACTATE, POTASSIUM CHLORIDE, CALCIUM CHLORIDE 600; 310; 30; 20 MG/100ML; MG/100ML; MG/100ML; MG/100ML
INJECTION, SOLUTION INTRAVENOUS CONTINUOUS PRN
Status: DISCONTINUED | OUTPATIENT
Start: 2025-03-10 | End: 2025-03-10

## 2025-03-10 RX ORDER — MIDAZOLAM HYDROCHLORIDE 2 MG/2ML
INJECTION, SOLUTION INTRAMUSCULAR; INTRAVENOUS AS NEEDED
Status: DISCONTINUED | OUTPATIENT
Start: 2025-03-10 | End: 2025-03-10

## 2025-03-10 RX ORDER — CEFAZOLIN SODIUM 2 G/50ML
SOLUTION INTRAVENOUS AS NEEDED
Status: DISCONTINUED | OUTPATIENT
Start: 2025-03-10 | End: 2025-03-10

## 2025-03-10 RX ORDER — PROMETHAZINE HYDROCHLORIDE 25 MG/ML
12.5 INJECTION, SOLUTION INTRAMUSCULAR; INTRAVENOUS ONCE
Status: DISCONTINUED | OUTPATIENT
Start: 2025-03-10 | End: 2025-03-11 | Stop reason: HOSPADM

## 2025-03-10 RX ORDER — SODIUM CHLORIDE, SODIUM LACTATE, POTASSIUM CHLORIDE, CALCIUM CHLORIDE 600; 310; 30; 20 MG/100ML; MG/100ML; MG/100ML; MG/100ML
125 INJECTION, SOLUTION INTRAVENOUS CONTINUOUS
Status: DISCONTINUED | OUTPATIENT
Start: 2025-03-10 | End: 2025-03-11 | Stop reason: HOSPADM

## 2025-03-10 RX ORDER — MAGNESIUM HYDROXIDE 1200 MG/15ML
LIQUID ORAL AS NEEDED
Status: DISCONTINUED | OUTPATIENT
Start: 2025-03-10 | End: 2025-03-10 | Stop reason: HOSPADM

## 2025-03-10 RX ORDER — ACETAMINOPHEN 325 MG/1
650 TABLET ORAL EVERY 6 HOURS SCHEDULED
Status: DISCONTINUED | OUTPATIENT
Start: 2025-03-10 | End: 2025-03-11 | Stop reason: HOSPADM

## 2025-03-10 RX ORDER — ONDANSETRON 2 MG/ML
INJECTION INTRAMUSCULAR; INTRAVENOUS AS NEEDED
Status: DISCONTINUED | OUTPATIENT
Start: 2025-03-10 | End: 2025-03-10

## 2025-03-10 RX ORDER — ALBUTEROL SULFATE 0.83 MG/ML
2.5 SOLUTION RESPIRATORY (INHALATION) ONCE AS NEEDED
Status: DISCONTINUED | OUTPATIENT
Start: 2025-03-10 | End: 2025-03-10 | Stop reason: HOSPADM

## 2025-03-10 RX ADMIN — PHENYLEPHRINE HYDROCHLORIDE 20 MCG/MIN: 10 INJECTION INTRAVENOUS at 15:30

## 2025-03-10 RX ADMIN — ACETAMINOPHEN 650 MG: 325 TABLET, FILM COATED ORAL at 19:44

## 2025-03-10 RX ADMIN — SODIUM CHLORIDE, SODIUM LACTATE, POTASSIUM CHLORIDE, AND CALCIUM CHLORIDE: .6; .31; .03; .02 INJECTION, SOLUTION INTRAVENOUS at 13:13

## 2025-03-10 RX ADMIN — PROPOFOL 200 MG: 10 INJECTION, EMULSION INTRAVENOUS at 14:47

## 2025-03-10 RX ADMIN — MIDAZOLAM 2 MG: 1 INJECTION INTRAMUSCULAR; INTRAVENOUS at 14:37

## 2025-03-10 RX ADMIN — ONDANSETRON 4 MG: 2 INJECTION INTRAMUSCULAR; INTRAVENOUS at 17:38

## 2025-03-10 RX ADMIN — DEXAMETHASONE SODIUM PHOSPHATE 10 MG: 10 INJECTION INTRAMUSCULAR; INTRAVENOUS at 14:51

## 2025-03-10 RX ADMIN — ONDANSETRON 4 MG: 2 INJECTION INTRAMUSCULAR; INTRAVENOUS at 15:53

## 2025-03-10 RX ADMIN — SODIUM CHLORIDE, SODIUM LACTATE, POTASSIUM CHLORIDE, AND CALCIUM CHLORIDE 125 ML/HR: .6; .31; .03; .02 INJECTION, SOLUTION INTRAVENOUS at 13:10

## 2025-03-10 RX ADMIN — FENTANYL CITRATE 25 MCG: 50 INJECTION INTRAMUSCULAR; INTRAVENOUS at 15:53

## 2025-03-10 RX ADMIN — FENTANYL CITRATE 50 MCG: 50 INJECTION INTRAMUSCULAR; INTRAVENOUS at 17:10

## 2025-03-10 RX ADMIN — PROPOFOL 80 MCG/KG/MIN: 10 INJECTION, EMULSION INTRAVENOUS at 14:51

## 2025-03-10 RX ADMIN — CEFAZOLIN SODIUM 2000 MG: 2 SOLUTION INTRAVENOUS at 14:55

## 2025-03-10 RX ADMIN — KETOROLAC TROMETHAMINE 15 MG: 30 INJECTION, SOLUTION INTRAMUSCULAR; INTRAVENOUS at 16:50

## 2025-03-10 RX ADMIN — LIDOCAINE HYDROCHLORIDE 100 MG: 10 INJECTION, SOLUTION EPIDURAL; INFILTRATION; INTRACAUDAL; PERINEURAL at 14:47

## 2025-03-10 RX ADMIN — SUGAMMADEX 200 MG: 100 INJECTION, SOLUTION INTRAVENOUS at 16:57

## 2025-03-10 RX ADMIN — SODIUM CHLORIDE, SODIUM LACTATE, POTASSIUM CHLORIDE, AND CALCIUM CHLORIDE: .6; .31; .03; .02 INJECTION, SOLUTION INTRAVENOUS at 15:47

## 2025-03-10 RX ADMIN — FENTANYL CITRATE 25 MCG: 50 INJECTION INTRAMUSCULAR; INTRAVENOUS at 15:52

## 2025-03-10 RX ADMIN — ROCURONIUM 50 MG: 50 INJECTION, SOLUTION INTRAVENOUS at 14:47

## 2025-03-10 RX ADMIN — FENTANYL CITRATE 50 MCG: 50 INJECTION INTRAMUSCULAR; INTRAVENOUS at 15:13

## 2025-03-10 RX ADMIN — FENTANYL CITRATE 50 MCG: 50 INJECTION INTRAMUSCULAR; INTRAVENOUS at 14:47

## 2025-03-10 RX ADMIN — FENTANYL CITRATE 25 MCG: 50 INJECTION, SOLUTION INTRAMUSCULAR; INTRAVENOUS at 18:11

## 2025-03-10 NOTE — INTERVAL H&P NOTE
H&P reviewed. After examining the patient I find no changes in the patients condition since the H&P had been written.    Vitals:    03/10/25 1248   BP: 159/82   Pulse: 76   Resp: 18   Temp: 97.8 °F (36.6 °C)   SpO2: 95%

## 2025-03-10 NOTE — ANESTHESIA POSTPROCEDURE EVALUATION
Post-Op Assessment Note    CV Status:  Stable  Pain Score: 0    Pain management: adequate       Mental Status:  Alert and awake   Hydration Status:  Euvolemic   PONV Controlled:  Controlled   Airway Patency:  Patent  Two or more mitigation strategies used for obstructive sleep apnea   Post Op Vitals Reviewed: Yes    No anethesia notable event occurred.    Staff: CRNA, Anesthesiologist           Last Filed PACU Vitals:  Vitals Value Taken Time   Temp 97.3    Pulse 70    /70    Resp 16    SpO2 98

## 2025-03-10 NOTE — OP NOTE
OPERATIVE REPORT  PATIENT NAME: Chen Peace    :  1980  MRN: [id number]  Pt Location: [facility] OR ROOM 05    SURGERY DATE: 3/10/2025    Surgeons and Role:     * Huber Vanegas MD - Primary    Preop Diagnosis:  Graves disease [E05.00]  Hyperthyroidism [E05.90]    Post-Op Diagnosis Codes:     * Graves disease [E05.00]     * Hyperthyroidism [E05.90]    Procedure(s):  Bilateral - TOTAL THYROIDECTOMY    Specimen(s):  ID Type Source Tests Collected by Time Destination   1 : Total thyroid suture marks short right, left long Tissue Thyroid TISSUE EXAM Huber Vanegas MD 3/10/2025 1628        Estimated Blood Loss:   20 mL    Drains:  * No LDAs found *    Anesthesia Type:   General    Operative Indications:  Graves disease [E05.00]  Hyperthyroidism [E05.90]      Operative Findings:  Nodular goiter      Complications:   None    Procedure and Technique:  The patient was brought into the operating room and identified by a proper timeout. Following this she was intubated by the anesthesia team. She was then positioned with a shoulder roll behind the scapula and the head in the sniffing position. The neck was then prepped and draped in the usual fashion. Following this, the skin at and around the planned cervical incision site was anesthetized with lidocaine. Next, a 5 cm incision was made 2 fingerbreadths above the sternal notch. Cautery was used to dissect through the dermis and subcutaneous fat. The platysma was transected with cautery. We then created flaps superiorly and inferiorly underneath the platysma. Gelpi retractors were then placed for exposure. We then proceeded to look for the strap muscles. We were able to visualize strap muscles and divided them to expose the thyroid.      We first focused on the right thyroid lobe. The strap muscles were mobilized them off the anterolateral aspect of the thyroid lobe. Using traction on the superior pole we were able to take down the superior pole vessels with Harmonic  Scalpel. We rotated the gland anteromedially. We saw what appeared to be the parathyroid glands which were visualized and preserved by means of close capsular dissection using bipolar device. We visualized recurrent laryngeal nerve as we rolled the small lobe forward. The ligament of berry was then clamped and tied off with a 2-0 vicryl suture. The lobe was then freed from anterior surface of the trachea with cautery. Valsalva was applied in the operative Field inspected for bleeding. None was seen. Surgicel was placed in the operative field.      We then focused on the left thyroid lobe. The strap muscles were mobilized them off the anterolateral aspect of the thyroid lobe. Using traction on the superior pole we were able to take down the superior pole vessels with Harmonic Scalpel. We rotated the gland anteromedially. We saw what appeared to be the parathyroid glands which were visualized and preserved by means of close capsular dissection using bipolar device. We visualized recurrent laryngeal nerve as we rolled the small lobe forward. The ligament of berry was then clamped and tied off with a 2-0 vicryl suture. The lobe was then freed from anterior surface of the trachea with cautery and the gland was then sent to pathology for processing. Valsalva was applied in the operative Field inspected for bleeding. None was seen. Surgicel was placed in the operative field.      Once we were sure of hemostasis, closure was performed using 3-0 Vicryl to close the strap muscles in the midline, followed by running 3-0 Vicryl to close the platysma, followed by a 4-0 vicryl to close the dermis in interrupted fashion, followed by 5-0 Monocryl placed in running subcuticular fashion to close the skin. Benzoin and steristrips were applied to the incision, followed by gauze and a blue towel. The patient tolerated the procedure well without any difficulties.   I was present for the entire procedure.    Patient Disposition:  PACU               SIGNATURE: Huber Vanegas MD  DATE: March 10, 2025  TIME: 5:18 PM

## 2025-03-10 NOTE — ANESTHESIA PREPROCEDURE EVALUATION
Procedure:  TOTAL THYROIDECTOMY (Bilateral: Neck)    Relevant Problems   CARDIO   (+) Essential hypertension      ENDO   (+) Hyperthyroidism      GI/HEPATIC   (+) Gastroesophageal reflux disease        Physical Exam    Airway    Mallampati score: II  TM Distance: >3 FB  Neck ROM: full     Dental   No notable dental hx     Cardiovascular  Cardiovascular exam normal    Pulmonary  Pulmonary exam normal     Other Findings  post-pubertal.      Anesthesia Plan  ASA Score- 3     Anesthesia Type- general with ASA Monitors.         Additional Monitors:     Airway Plan: ETT.           Plan Factors-Exercise tolerance (METS): >4 METS.    Chart reviewed.        Patient is not a current smoker.              Induction- intravenous.    Postoperative Plan- Plan for postoperative opioid use. Planned trial extubation        Informed Consent- Anesthetic plan and risks discussed with patient.        NPO Status:  Vitals Value Taken Time   Date of last liquid 03/10/25 03/10/25 1239   Time of last liquid 1030 03/10/25 1239   Date of last solid 03/09/25 03/10/25 1239   Time of last solid 2200 03/10/25 1239

## 2025-03-10 NOTE — DISCHARGE INSTR - AVS FIRST PAGE
Please follow-up as scheduled with Dr. Vanegas. If you do not already have a follow-up appointment, please call the office when you leave to schedule an appointment to be seen in 2-3 weeks for post-operative re-evaluation.    Activity:  - No lifting greater than 20 pounds or strenuous physical activity or exercise for 2 weeks.  - Walking and normal light activities are encouraged.  - Normal daily activities including climbing steps are okay.  - No driving until no longer using pain medications.    Diet:    - You may resume your normal diet.    Wound Care:  - May shower daily starting on 3/12. No tub baths or swimming until cleared by your surgeon.  - Wash incision gently with soap and water and pat dry. Do not scrub. Do not submerge incision.  - Your incision is closed with absorbable sutures and skin glue. Please leave the skin glue on. It will fall off on its own. There are loops from the absorbable sutures that will be removed in the office.  - Do not apply any creams or ointments unless instructed to do so by your surgeon.  - You may apply ice as needed (no longer than 20 minutes at a time) for the first 48 hours.  - Bruising is not unusual and will go away with a little time. You may apply a warm, moist compress that may help the bruising resolve quicker.  - You may remove the dressings the day after surgery (unless otherwise instructed). Leave any skin tapes (steri-strips) on the incision(s) in place until they fall off on their own. Any new dressings are optional.    Medications:     - You should continue your current medication regimen after going home unless otherwise instructed. Please refer to your discharge medication list for further details.  - Please take the pain medications as directed.  - You are encouraged to use non-narcotic pain medications first and whenever possible. Reserve the use of narcotic pain medication for moderate to severe pain not controlled by non-narcotic medications.  - No driving  while taking narcotic pain medications.  - You may become constipated, especially if taking pain medications. You may take any over the counter stool softeners or laxatives as needed. Examples: Milk of Magnesia, Colace, Senna.  - Changes to medications include: stop taking methimazole. Start thyroid replacement- Synthroid. Follow up with Endocrinologist in 1-2 weeks.     Additional Instructions:  - If you have any questions or concerns after discharge please call the office.  - If you experience any numbness or tingling around your mouth or in hands/fingers, take two Tums and call the office or return to the ER.  - If you experience increasing neck swelling with difficulty swallowing/breathing return to ER immediately.  - Call office or return to ER if fever greater than 101, chills, persistent nausea/vomiting, worsening/uncontrollable pain, and/or increasing redness or purulent/foul smelling drainage from incision(s).    - - -

## 2025-03-11 VITALS
TEMPERATURE: 98.3 F | HEART RATE: 58 BPM | OXYGEN SATURATION: 96 % | WEIGHT: 229.72 LBS | SYSTOLIC BLOOD PRESSURE: 122 MMHG | DIASTOLIC BLOOD PRESSURE: 62 MMHG | BODY MASS INDEX: 46.31 KG/M2 | HEIGHT: 59 IN | RESPIRATION RATE: 17 BRPM

## 2025-03-11 PROCEDURE — 99024 POSTOP FOLLOW-UP VISIT: CPT | Performed by: SURGERY

## 2025-03-11 RX ORDER — AMLODIPINE BESYLATE 5 MG/1
5 TABLET ORAL DAILY
Status: DISCONTINUED | OUTPATIENT
Start: 2025-03-11 | End: 2025-03-11 | Stop reason: HOSPADM

## 2025-03-11 RX ORDER — ALBUTEROL SULFATE 90 UG/1
2 INHALANT RESPIRATORY (INHALATION) EVERY 6 HOURS PRN
Status: DISCONTINUED | OUTPATIENT
Start: 2025-03-11 | End: 2025-03-11 | Stop reason: HOSPADM

## 2025-03-11 RX ORDER — PROPRANOLOL HYDROCHLORIDE 80 MG/1
160 CAPSULE, EXTENDED RELEASE ORAL 2 TIMES DAILY
Status: DISCONTINUED | OUTPATIENT
Start: 2025-03-11 | End: 2025-03-11 | Stop reason: HOSPADM

## 2025-03-11 RX ADMIN — AMLODIPINE BESYLATE 5 MG: 5 TABLET ORAL at 09:06

## 2025-03-11 RX ADMIN — PROPRANOLOL HYDROCHLORIDE 160 MG: 80 CAPSULE, EXTENDED RELEASE ORAL at 09:07

## 2025-03-11 RX ADMIN — ACETAMINOPHEN 650 MG: 325 TABLET, FILM COATED ORAL at 05:42

## 2025-03-11 RX ADMIN — ACETAMINOPHEN 650 MG: 325 TABLET, FILM COATED ORAL at 11:28

## 2025-03-11 NOTE — PLAN OF CARE
Problem: PAIN - ADULT  Goal: Verbalizes/displays adequate comfort level or baseline comfort level  Description: Interventions:  - Encourage patient to monitor pain and request assistance  - Assess pain using appropriate pain scale  - Administer analgesics based on type and severity of pain and evaluate response  - Implement non-pharmacological measures as appropriate and evaluate response  - Consider cultural and social influences on pain and pain management  - Notify physician/advanced practitioner if interventions unsuccessful or patient reports new pain  Outcome: Progressing     Problem: INFECTION - ADULT  Goal: Absence or prevention of progression during hospitalization  Description: INTERVENTIONS:  - Assess and monitor for signs and symptoms of infection  - Monitor lab/diagnostic results  - Monitor all insertion sites, i.e. indwelling lines, tubes, and drains  - Monitor endotracheal if appropriate and nasal secretions for changes in amount and color  - [place] appropriate cooling/warming therapies per order  - Administer medications as ordered  - Instruct and encourage patient and family to use good hand hygiene technique  - Identify and instruct in appropriate isolation precautions for identified infection/condition  Outcome: Progressing  Goal: Absence of fever/infection during neutropenic period  Description: INTERVENTIONS:  - Monitor WBC    Outcome: Progressing     Problem: DISCHARGE PLANNING  Goal: Discharge to home or other facility with appropriate resources  Description: INTERVENTIONS:  - Identify barriers to discharge w/patient and caregiver  - Arrange for needed discharge resources and transportation as appropriate  - Identify discharge learning needs (meds, wound care, etc.)  - Arrange for interpretive services to assist at discharge as needed  - Refer to Case Management Department for coordinating discharge planning if the patient needs post-hospital services based on physician/advanced  practitioner order or complex needs related to functional status, cognitive ability, or social support system  Outcome: Progressing

## 2025-03-11 NOTE — QUICK NOTE
"Surgical oncology  Post-Op Check Progress Note   Chen Peace 44 y.o. female MRN: [id number]  Unit/Bed#: E5 -01 Encounter: [id number]    Assessment & Plan  Graves disease  44 y.o. female presenting for scheduled operation for Graves' disease.  Now s/p total thyroidectomy on 10/3    Plan  -Regular diet  -As needed pain and nausea control  -Monitor neck exam  -Plan for discharge tomorrow morning      Subjective/Objective     Subjective: AVSS on room air. Patient reports pain is controlled. Tolerating diet. Voiding spontaneously and passing flatus.  Patient reports mild neck pain when stretches chin upwards.  No pain at rest.  Denies any numbness or tingling of her face or oral cavity.  Denies any hoarseness, difficulty swallowing or pain within her throat.  No notable hematoma or skin changes around incision/surgery site.  Denies fever, chills, nausea, vomiting.       Objective:     Blood pressure 151/93, pulse 91, temperature 98.1 °F (36.7 °C), resp. rate 14, height 4' 11\" (1.499 m), weight 104 kg (229 lb 11.5 oz), last menstrual period 03/07/2025, SpO2 90%, not currently breastfeeding.,Body mass index is 46.4 kg/m².      Intake/Output Summary (Last 24 hours) at 3/10/2025 2336  Last data filed at 3/10/2025 1930  Gross per 24 hour   Intake 1750 ml   Output 20 ml   Net 1730 ml       Invasive Devices       Peripheral Intravenous Line  Duration             Peripheral IV 03/10/25 Right;Dorsal (posterior) Hand <1 day                    Physical Exam:    General: NAD  HENT: NCAT MMM  Neck: supple, no JVD, no swelling, hematoma, numbness or tingling.  Incision clean, dry, intact.  CV: nl rate  Lungs: nl wob. No resp distress  ABD: Soft, nontender, nondistended  Extrem: No CCE  Neuro: AAOx3      Houston King MD  3/10/2025    "

## 2025-03-11 NOTE — ASSESSMENT & PLAN NOTE
44 y.o. female presenting for scheduled operation for Graves' disease.  Now s/p total thyroidectomy on 10/3    Plan  -Regular diet  -As needed pain and nausea control  -Monitor neck exam  -Plan for discharge today

## 2025-03-11 NOTE — ASSESSMENT & PLAN NOTE
44 y.o. female presenting for scheduled operation for Graves' disease.  Now s/p total thyroidectomy on 10/3    Plan  -Regular diet  -As needed pain and nausea control  -Monitor neck exam  -Plan for discharge tomorrow morning

## 2025-03-11 NOTE — NURSING NOTE
Agree with previous RN assessment. Pt states she is ready to go home . Will prepare pt for discharge as ordered.

## 2025-03-11 NOTE — PROGRESS NOTES
"Progress Note - Oncology-Surgical   Name: Chen Peace 44 y.o. female I MRN: [id number]  Unit/Bed#: E5 -01 I Date of Admission: 3/10/2025   Date of Service: 3/11/2025 I Hospital Day: 0    Assessment & Plan  Graves disease  44 y.o. female presenting for scheduled operation for Graves' disease.  Now s/p total thyroidectomy on 10/3    Plan  -Regular diet  -As needed pain and nausea control  -Monitor neck exam  -Plan for discharge today    Please contact the [employer] role for the Oncology-Surgical service with any questions/concerns.    Subjective   NAEON. AVSS on room air. Patient reports pain is controlled. Tolerating diet. Voiding spontaneously and having bowel function. Denies fever, chills, nausea, vomiting.     Objective :  Temp:  [97.5 °F (36.4 °C)-98.6 °F (37 °C)] 98.6 °F (37 °C)  HR:  [68-91] 68  BP: (132-172)/(55-95) 132/55  Resp:  [14-18] 14  SpO2:  [90 %-99 %] 96 %  O2 Device: None (Room air)  Nasal Cannula O2 Flow Rate (L/min):  [2 L/min] 2 L/min    I/O         03/09 0701  03/10 0700 03/10 0701  03/11 0700    I.V. (mL/kg)  1750 (16.8)    Total Intake(mL/kg)  1750 (16.8)    Blood  20    Total Output  20    Net  +1730                  Physical Exam  General: NAD  HENT: NCAT MMM  Neck: supple, no JVD, no swelling, hematoma, numbness or tingling.  Incision clean, dry, intact.  CV: nl rate  Lungs: nl wob. No resp distress  ABD: Soft, nontender, nondistended  Extrem: No CCE  Neuro: AAOx3      Lab Results: I have reviewed the following results:  No results for input(s): \"WBC\", \"HGB\", \"HCT\", \"PLT\", \"BANDSPCT\", \"SODIUM\", \"K\", \"CL\", \"CO2\", \"BUN\", \"CREATININE\", \"GLUC\", \"CAIONIZED\", \"MG\", \"PHOS\", \"AST\", \"ALT\", \"ALB\", \"TBILI\", \"DBILI\", \"ALKPHOS\", \"PTT\", \"INR\", \"HSTNI0\", \"HSTNI2\", \"BNP\", \"LACTICACID\" in the last 72 hours.      VTE Pharmacologic Prophylaxis: VTE covered by:    None      VTE Mechanical Prophylaxis: sequential compression device    Houston King MD  General Surgery Resident    "

## 2025-03-12 ENCOUNTER — APPOINTMENT (EMERGENCY)
Dept: RADIOLOGY | Facility: HOSPITAL | Age: 45
End: 2025-03-12
Payer: COMMERCIAL

## 2025-03-12 ENCOUNTER — NURSE TRIAGE (OUTPATIENT)
Age: 45
End: 2025-03-12

## 2025-03-12 ENCOUNTER — HOSPITAL ENCOUNTER (EMERGENCY)
Facility: HOSPITAL | Age: 45
Discharge: HOME | End: 2025-03-12
Attending: EMERGENCY MEDICINE
Payer: COMMERCIAL

## 2025-03-12 VITALS
HEART RATE: 52 BPM | TEMPERATURE: 97.9 F | OXYGEN SATURATION: 96 % | WEIGHT: 229.28 LBS | DIASTOLIC BLOOD PRESSURE: 106 MMHG | SYSTOLIC BLOOD PRESSURE: 170 MMHG | RESPIRATION RATE: 20 BRPM | BODY MASS INDEX: 46.22 KG/M2 | HEIGHT: 59 IN

## 2025-03-12 DIAGNOSIS — R00.1 BRADYCARDIA: ICD-10-CM

## 2025-03-12 DIAGNOSIS — R79.89 ELEVATED TSH: Primary | ICD-10-CM

## 2025-03-12 DIAGNOSIS — E89.0 HISTORY OF THYROIDECTOMY: ICD-10-CM

## 2025-03-12 LAB
ALBUMIN SERPL BCG-MCNC: 4.2 G/DL (ref 3.5–5)
ALP SERPL-CCNC: 71 U/L (ref 34–104)
ALT SERPL W P-5'-P-CCNC: 13 U/L (ref 7–52)
ANION GAP SERPL CALCULATED.3IONS-SCNC: 7 MMOL/L (ref 4–13)
APTT PPP: 28 SECONDS (ref 23–34)
AST SERPL W P-5'-P-CCNC: 15 U/L (ref 13–39)
BASOPHILS # BLD AUTO: 0.01 THOUSANDS/ÂΜL (ref 0–0.1)
BASOPHILS NFR BLD AUTO: 0 % (ref 0–1)
BILIRUB SERPL-MCNC: 0.36 MG/DL (ref 0.2–1)
BUN SERPL-MCNC: 13 MG/DL (ref 5–25)
CALCIUM SERPL-MCNC: 8.9 MG/DL (ref 8.4–10.2)
CARDIAC TROPONIN I PNL SERPL HS: 3 NG/L (ref ?–50)
CHLORIDE SERPL-SCNC: 104 MMOL/L (ref 96–108)
CO2 SERPL-SCNC: 27 MMOL/L (ref 21–32)
CREAT SERPL-MCNC: 0.77 MG/DL (ref 0.6–1.3)
EOSINOPHIL # BLD AUTO: 0.04 THOUSAND/ÂΜL (ref 0–0.61)
EOSINOPHIL NFR BLD AUTO: 1 % (ref 0–6)
ERYTHROCYTE [DISTWIDTH] IN BLOOD BY AUTOMATED COUNT: 16.2 % (ref 11.6–15.1)
GFR SERPL CREATININE-BSD FRML MDRD: 92 ML/MIN/1.73SQ M
GLUCOSE SERPL-MCNC: 96 MG/DL (ref 65–140)
HCT VFR BLD AUTO: 40.2 % (ref 34.8–46.1)
HGB BLD-MCNC: 12.8 G/DL (ref 11.5–15.4)
IMM GRANULOCYTES # BLD AUTO: 0.02 THOUSAND/UL (ref 0–0.2)
IMM GRANULOCYTES NFR BLD AUTO: 0 % (ref 0–2)
INR PPP: 0.91 (ref 0.85–1.19)
LYMPHOCYTES # BLD AUTO: 2.88 THOUSANDS/ÂΜL (ref 0.6–4.47)
LYMPHOCYTES NFR BLD AUTO: 38 % (ref 14–44)
MCH RBC QN AUTO: 26.4 PG (ref 26.8–34.3)
MCHC RBC AUTO-ENTMCNC: 31.8 G/DL (ref 31.4–37.4)
MCV RBC AUTO: 83 FL (ref 82–98)
MONOCYTES # BLD AUTO: 0.55 THOUSAND/ÂΜL (ref 0.17–1.22)
MONOCYTES NFR BLD AUTO: 7 % (ref 4–12)
NEUTROPHILS # BLD AUTO: 4.15 THOUSANDS/ÂΜL (ref 1.85–7.62)
NEUTS SEG NFR BLD AUTO: 54 % (ref 43–75)
NRBC BLD AUTO-RTO: 0 /100 WBCS
PLATELET # BLD AUTO: 310 THOUSANDS/UL (ref 149–390)
PMV BLD AUTO: 9.8 FL (ref 8.9–12.7)
POTASSIUM SERPL-SCNC: 4.2 MMOL/L (ref 3.5–5.3)
PROT SERPL-MCNC: 7.2 G/DL (ref 6.4–8.4)
PROTHROMBIN TIME: 12.8 SECONDS (ref 12.3–15)
RBC # BLD AUTO: 4.84 MILLION/UL (ref 3.81–5.12)
SODIUM SERPL-SCNC: 138 MMOL/L (ref 135–147)
TSH SERPL DL<=0.05 MIU/L-ACNC: 39.49 UIU/ML (ref 0.45–4.5)
WBC # BLD AUTO: 7.65 THOUSAND/UL (ref 4.31–10.16)

## 2025-03-12 PROCEDURE — 84443 ASSAY THYROID STIM HORMONE: CPT | Performed by: PHYSICIAN ASSISTANT

## 2025-03-12 PROCEDURE — 85610 PROTHROMBIN TIME: CPT | Performed by: PHYSICIAN ASSISTANT

## 2025-03-12 PROCEDURE — 99284 EMERGENCY DEPT VISIT MOD MDM: CPT

## 2025-03-12 PROCEDURE — 84484 ASSAY OF TROPONIN QUANT: CPT

## 2025-03-12 PROCEDURE — 99285 EMERGENCY DEPT VISIT HI MDM: CPT | Performed by: PHYSICIAN ASSISTANT

## 2025-03-12 PROCEDURE — 85730 THROMBOPLASTIN TIME PARTIAL: CPT | Performed by: PHYSICIAN ASSISTANT

## 2025-03-12 PROCEDURE — 85025 COMPLETE CBC W/AUTO DIFF WBC: CPT

## 2025-03-12 PROCEDURE — 84439 ASSAY OF FREE THYROXINE: CPT | Performed by: PHYSICIAN ASSISTANT

## 2025-03-12 PROCEDURE — 93005 ELECTROCARDIOGRAM TRACING: CPT

## 2025-03-12 PROCEDURE — 80053 COMPREHEN METABOLIC PANEL: CPT

## 2025-03-12 PROCEDURE — 36415 COLL VENOUS BLD VENIPUNCTURE: CPT

## 2025-03-12 PROCEDURE — 71045 X-RAY EXAM CHEST 1 VIEW: CPT

## 2025-03-12 RX ORDER — PROPRANOLOL HCL 20 MG
80 TABLET ORAL ONCE
Status: COMPLETED | OUTPATIENT
Start: 2025-03-12 | End: 2025-03-12

## 2025-03-12 RX ORDER — PROPRANOLOL HYDROCHLORIDE 80 MG/1
80 TABLET ORAL 2 TIMES DAILY
Qty: 14 TABLET | Refills: 0 | Status: SHIPPED | OUTPATIENT
Start: 2025-03-12 | End: 2025-03-17

## 2025-03-12 RX ADMIN — PROPRANOLOL HYDROCHLORIDE 80 MG: 20 TABLET ORAL at 23:27

## 2025-03-12 NOTE — ED PROVIDER NOTES
Time reflects when diagnosis was documented in both MDM as applicable and the Disposition within this note       Time User Action Codes Description Comment    3/12/2025 11:14 PM Jack Bejarano [R79.89] Elevated TSH     3/12/2025 11:15 PM Jack Bejarano [E89.0] History of thyroidectomy     3/12/2025 11:21 PM Jack Bejarano [R00.1] Bradycardia           ED Disposition       ED Disposition   Discharge    Condition   Stable    Date/Time   Wed Mar 12, 2025 11:20 PM    Comment   Chen Peace discharge to home/self care.                   Assessment & Plan       Medical Decision Making  Recent thyroidectomy 3/10/2025 for history of hyperthyroidism  TSH 39.487 currently: Patient is scheduled to take synthroid 137 mcg this Monday per her endocrinologist  Chest x-ray with no acute cardiopulmonary disease, no cardiomegaly  Heart rate in the department in the 50s  189/91 last blood pressure;   Patient takes amlodipine 5 mg daily for hypertension diagnosis  patient was wondering/inquiring if she should still take her propranolol 160 mg twice daily; patient had taken that to pull her out of thyroid storm in the past. Given patient low heart rate I suggested that patient should stop the propranolol after the total/complete thyroidectomy and follow-up with her PCP for further evaluation and reevaluation of antihypertensive medications  Patient is scheduled to take her levothyroxine 137 mcg Monday; per patient report, requested by her endocrinologist.   Recommendation by endocrinologist to down titrate the blocker by 50% every week and start taking her levothyroxine and follow-up with endocrinology as scheduled.  Patient states that she will call endocrinologist tomorrow.  Patient requested first dose of Imdur 80 mg this evening, will take levothyroxine when she gets home patient states that she has a prescription.  Prescribed 7 days of propranolol in the milligrams twice daily for 7 days at this time and endocrinology  may acclimate patient dosage onto their recommendation  Follow-up with PCP in the emergency department should symptoms persist or exacerbate  Patient demonstrates verbal understanding of all clinical laboratory and imaging findings, discharge instructions follow-up and verbalized agreement with patient Contreet plan with teach back      Amount and/or Complexity of Data Reviewed  Labs: ordered. Decision-making details documented in ED Course.  Radiology: ordered and independent interpretation performed. Decision-making details documented in ED Course.  ECG/medicine tests: ordered and independent interpretation performed. Decision-making details documented in ED Course.    Risk  Prescription drug management.        ED Course as of 03/13/25 0026   Wed Mar 12, 2025   2212 TSH 3RD GENERATON(!): 39.487  Recent thyroidectomy; temporary state of hypothyroidism, likely etiology of patient low heart rate symptomatology, while patient follow-up with endocrinologist for replacement of thyroid hormone   2303 Dr. Mckinley Mendoza, endocrinology with recommendation to taper patient  The beta blocker should be tapered by reducing the dose by 50% every week; continue the beta blocker over the next few days as half a pill (80 mg) twice a day until her endocrinology appointment that scheduled.  Patient requested to take       Medications   propranolol (INDERAL) tablet 80 mg (80 mg Oral Given 3/12/25 2327)       ED Risk Strat Scores                            SBIRT 22yo+      Flowsheet Row Most Recent Value   Initial Alcohol Screen: US AUDIT-C     1. How often do you have a drink containing alcohol? 0 Filed at: 03/12/2025 1914   2. How many drinks containing alcohol do you have on a typical day you are drinking?  0 Filed at: 03/12/2025 1914   3a. Male UNDER 65: How often do you have five or more drinks on one occasion? 0 Filed at: 03/12/2025 1914   3b. FEMALE Any Age, or MALE 65+: How often do you have 4 or more drinks on one  "occassion? 0 Filed at: 2025   Audit-C Score 0 Filed at: 2025   TYLER: How many times in the past year have you...    Used an illegal drug or used a prescription medication for non-medical reasons? Never Filed at: 2025                            History of Present Illness       Chief Complaint   Patient presents with    Slow Heart Rate     Pt had a thyroidectomy on Monday(thyroid storm in December. states her BP cuff at home showed her HR is dropping below 50. Pt states her baseline HR is 70s. Pt denies any chest pain but is having weakness.      Patient is a 44-year-old female with recent thyroidectomy 3/10/2025 for history of hyperthyroidism, hyperlipidemia, pretension and other surgical history of  section x 2 and tubal ligation 2016 that presents to the emergency department with low heart rate today.  Patient reports having a low heart rate in the 40s and 50s and reached out to the cardiology nurse on call with indication to be evaluated in the ED for further evaluation for low heart rate.  Patient does report \"low heart rate and feeling a little woozy when I was in the 40s when it was on my Apple Watch.\"  Patient denies any pain at this time.  Patient states that she had taken her Norvasc this morning 5 mg, however she has not taken her propranolol today (160 twice daily).  Patient denies any pain at this time and states that her postsurgical wound is healing well.  Patient denies palliative and provocative factors.  Patient denies noneffective treatment.  Patient denies fevers, chills, nausea, vomiting, diarrhea, constipation and urinary symptoms.  Patient denies recent fall and recent trauma.  Patient denies sick contacts and recent travel.  Patient denies chest pain, shortness of breath, and abdominal pain.    Past Medical History:   Diagnosis Date    Anemia     Anxiety and depression     Fibroids     Gallstones     Graves disease     b/l thyroidectomy today 3/10/2025 "    Hyperlipidemia     Hypertension     Hyperthyroidism determined by thyroid function test     Menorrhagia with regular cycle     MRSA infection     Obesity     Reactive airway disease     Vitamin D deficiency     Wears glasses       Past Surgical History:   Procedure Laterality Date     SECTION      X2    EGD AND COLONOSCOPY  2024    MAMMO (HISTORICAL)  2020    ND THYROIDECTOMY TOTAL/COMPLETE Bilateral 3/10/2025    Procedure: TOTAL THYROIDECTOMY;  Surgeon: Huber Vanegas MD;  Location: [facility];  Service: Surgical Oncology    TUBAL LIGATION      pt had embedded IUD removed and tubal ligation performed      Family History   Problem Relation Age of Onset    Hyperthyroidism Mother     Thyroid disease unspecified Mother         thyroidectomy for hyperthyroidism    Early menopause Mother     Endometriosis Mother     Hypertension Mother     Hyperlipidemia Mother     Breast cancer Mother     Skin cancer Father     Stroke Father     Coronary artery disease Father     Hypertension Father     Hyperlipidemia Father     Pancreatic cancer Father     Hypertension Maternal Aunt     Hypertension Maternal Uncle     Thyroid disease unspecified Paternal Aunt         thyroidectomy    Hypertension Paternal Aunt     Hypertension Paternal Uncle     Hypothyroidism Maternal Grandmother     Colon cancer Maternal Grandmother     Hypertension Maternal Grandmother     Hypertension Maternal Grandfather     Hypertension Paternal Grandmother     Hypertension Paternal Grandfather     No Known Problems Daughter     No Known Problems Son     Thyroid disease unspecified Cousin         paternal cousin with thyroidectomy    Hypothyroidism Cousin         maternal cousin with hypothyroidism    Thyroid disease unspecified Other     Ovarian cancer Neg Hx     Uterine cancer Neg Hx       Social History     Tobacco Use    Smoking status: Never     Passive exposure: Never    Smokeless tobacco: Never   Vaping Use    Vaping status:  Never Used   Substance Use Topics    Alcohol use: Not Currently     Comment: rarely    Drug use: Never      E-Cigarette/Vaping    E-Cigarette Use Never User       E-Cigarette/Vaping Substances    Nicotine No     THC No     CBD No     Flavoring No     Other No     Unknown No       I have reviewed and agree with the history as documented.       History provided by:  Patient   used: No        Review of Systems   Constitutional:  Negative for activity change, appetite change, chills and fever.   HENT:  Negative for congestion, ear pain, postnasal drip, rhinorrhea, sinus pressure, sinus pain, sore throat and tinnitus.    Eyes:  Negative for photophobia, pain and visual disturbance.   Respiratory:  Negative for cough, chest tightness and shortness of breath.    Cardiovascular:  Negative for chest pain and palpitations.   Gastrointestinal:  Negative for abdominal pain, constipation, diarrhea, nausea and vomiting.   Genitourinary:  Negative for difficulty urinating, dysuria, flank pain, frequency, hematuria and urgency.   Musculoskeletal:  Negative for arthralgias, back pain, gait problem, neck pain and neck stiffness.   Skin:  Negative for color change, pallor and rash.   Allergic/Immunologic: Negative for environmental allergies and food allergies.   Neurological:  Negative for dizziness, seizures, syncope, weakness, numbness and headaches.   Psychiatric/Behavioral:  Negative for confusion.    All other systems reviewed and are negative.          Objective       ED Triage Vitals   Temperature Pulse Blood Pressure Respirations SpO2 Patient Position - Orthostatic VS   03/12/25 1942 03/12/25 1912 03/12/25 1912 03/12/25 1912 03/12/25 1912 03/12/25 1912   97.9 °F (36.6 °C) 58 (!) 184/89 20 99 % Sitting      Temp Source Heart Rate Source BP Location FiO2 (%) Pain Score    03/12/25 1942 03/12/25 1912 03/12/25 1912 -- --    Oral Monitor Right arm        Vitals      Date and Time Temp Pulse SpO2 Resp BP Pain  "Score FACES Pain Rating User   03/12/25 2254 -- 52 -- -- 170/106 -- --    03/12/25 2100 -- 55 96 % 20 189/91 -- --    03/12/25 1942 97.9 °F (36.6 °C) -- -- -- -- -- -- KK   03/12/25 1913 -- -- 99 % -- -- -- --    03/12/25 1912 -- 58 99 % 20 184/89 -- --             Physical Exam  Vitals and nursing note reviewed.   Constitutional:       General: She is awake.      Appearance: Normal appearance. She is well-developed and normal weight. She is not ill-appearing, toxic-appearing or diaphoretic.      Comments: BP (!) 184/89 (BP Location: Right arm)   Pulse 58   Temp 97.9 °F (36.6 °C) (Oral)   Resp 20   Ht 4' 11\" (1.499 m)   Wt 104 kg (229 lb 4.5 oz)   LMP 03/07/2025 (Exact Date)   SpO2 99%   BMI 46.31 kg/m²      HENT:      Head: Normocephalic and atraumatic.      Jaw: There is normal jaw occlusion.      Right Ear: Hearing, tympanic membrane, ear canal and external ear normal. No decreased hearing noted. No drainage, swelling or tenderness. No mastoid tenderness.      Left Ear: Hearing, tympanic membrane, ear canal and external ear normal. No decreased hearing noted. No drainage, swelling or tenderness. No mastoid tenderness.      Nose: Nose normal.      Mouth/Throat:      Lips: Pink.      Mouth: Mucous membranes are moist.      Pharynx: Oropharynx is clear. Uvula midline.   Eyes:      General: Lids are normal. Vision grossly intact. Gaze aligned appropriately.         Right eye: No discharge.         Left eye: No discharge.      Extraocular Movements: Extraocular movements intact.      Conjunctiva/sclera: Conjunctivae normal.      Pupils: Pupils are equal, round, and reactive to light.   Neck:      Vascular: No JVD.      Trachea: Trachea and phonation normal. No tracheal tenderness or tracheal deviation.     Cardiovascular:      Rate and Rhythm: Regular rhythm. Bradycardia present.      Pulses: Normal pulses.           Radial pulses are 2+ on the right side and 2+ on the left side.        Posterior " tibial pulses are 2+ on the right side and 2+ on the left side.      Heart sounds: Normal heart sounds.   Pulmonary:      Effort: Pulmonary effort is normal.      Breath sounds: Normal breath sounds and air entry. No stridor. No decreased breath sounds, wheezing, rhonchi or rales.   Chest:      Chest wall: No tenderness.   Abdominal:      General: Abdomen is flat. Bowel sounds are normal. There is no distension.      Palpations: Abdomen is soft. Abdomen is not rigid.      Tenderness: There is no abdominal tenderness. There is no guarding or rebound.   Musculoskeletal:         General: Normal range of motion.      Cervical back: Full passive range of motion without pain, normal range of motion and neck supple. No rigidity. No spinous process tenderness or muscular tenderness. Normal range of motion.      Comments: Passive ROM intact  Upper and lower extremity 5/5 bilaterally  Neurovascularly intact  No grinding or clicking of joints       Feet:      Right foot:      Toenail Condition: Right toenails are normal.      Left foot:      Toenail Condition: Left toenails are normal.   Lymphadenopathy:      Head:      Right side of head: No submental, submandibular, tonsillar, preauricular, posterior auricular or occipital adenopathy.      Left side of head: No submental, submandibular, tonsillar, preauricular, posterior auricular or occipital adenopathy.      Cervical: No cervical adenopathy.      Right cervical: No superficial, deep or posterior cervical adenopathy.     Left cervical: No superficial, deep or posterior cervical adenopathy.   Skin:     General: Skin is warm.      Capillary Refill: Capillary refill takes less than 2 seconds.      Findings: No rash.   Neurological:      General: No focal deficit present.      Mental Status: She is alert and oriented to person, place, and time. Mental status is at baseline.      GCS: GCS eye subscore is 4. GCS verbal subscore is 5. GCS motor subscore is 6.      Cranial Nerves:  Cranial nerves 2-12 are intact.      Sensory: No sensory deficit.      Deep Tendon Reflexes: Reflexes are normal and symmetric.      Reflex Scores:       Patellar reflexes are 2+ on the right side and 2+ on the left side.  Psychiatric:         Attention and Perception: Attention normal.         Mood and Affect: Mood normal.         Speech: Speech normal.         Behavior: Behavior normal. Behavior is cooperative.         Thought Content: Thought content normal.         Judgment: Judgment normal.         Results Reviewed       Procedure Component Value Units Date/Time    TSH, 3rd generation with Free T4 reflex [[id number]]  (Abnormal) Collected: 03/12/25 1959    Lab Status: Final result Specimen: Blood from Arm, Left Updated: 03/12/25 2206     TSH 3RD GENERATON 39.487 uIU/mL     T4, free [[id number]] Collected: 03/12/25 1959    Lab Status: In process Specimen: Blood from Arm, Left Updated: 03/12/25 2206    HS Troponin 0hr (reflex protocol) [[id number]]  (Normal) Collected: 03/12/25 1959    Lab Status: Final result Specimen: Blood from Arm, Left Updated: 03/12/25 2030     hs TnI 0hr 3 ng/L     Comprehensive metabolic panel [[id number]] Collected: 03/12/25 1959    Lab Status: Final result Specimen: Blood from Arm, Left Updated: 03/12/25 2023     Sodium 138 mmol/L      Potassium 4.2 mmol/L      Chloride 104 mmol/L      CO2 27 mmol/L      ANION GAP 7 mmol/L      BUN 13 mg/dL      Creatinine 0.77 mg/dL      Glucose 96 mg/dL      Calcium 8.9 mg/dL      AST 15 U/L      ALT 13 U/L      Alkaline Phosphatase 71 U/L      Total Protein 7.2 g/dL      Albumin 4.2 g/dL      Total Bilirubin 0.36 mg/dL      eGFR 92 ml/min/1.73sq m     Narrative:      National Kid[employer] guidelines for Chronic Kidney Disease (CKD):     Stage 1 with normal or high GFR (GFR > 90 mL/min/1.73 square meters)    Stage 2 Mild CKD (GFR = 60-89 mL/min/1.73 square meters)    Stage 3A Moderate CKD (GFR = 45-59 mL/min/1.73 square meters)    Stage 3B  Moderate CKD (GFR = 30-44 mL/min/1.73 square meters)    Stage 4 Severe CKD (GFR = 15-29 mL/min/1.73 square meters)    Stage 5 End Stage CKD (GFR <15 mL/min/1.73 square meters)  Note: GFR calculation is accurate only with a steady state creatinine    Protime-INR [[id number]]  (Normal) Collected: 03/12/25 2004    Lab Status: Final result Specimen: Blood from Arm, Left Updated: 03/12/25 2021     Protime 12.8 seconds      INR 0.91    Narrative:      INR Therapeutic Range    Indication                                             INR Range      Atrial Fibrillation                                               2.0-3.0  Hypercoagulable State                                    2.0.2.3  Left Ventricular Asist Device                            2.0-3.0  Mechanical Heart Valve                                  -    Aortic(with afib, MI, embolism, HF, LA enlargement,    and/or coagulopathy)                                     2.0-3.0 (2.5-3.5)     Mitral                                                             2.5-3.5  Prosthetic/Bioprosthetic Heart Valve               2.0-3.0  Venous thromboembolism (VTE: VT, PE        2.0-3.0    APTT [[id number]]  (Normal) Collected: 03/12/25 2004    Lab Status: Final result Specimen: Blood from Arm, Left Updated: 03/12/25 2021     PTT 28 seconds     CBC and differential [[id number]]  (Abnormal) Collected: 03/12/25 1959    Lab Status: Final result Specimen: Blood from Arm, Left Updated: 03/12/25 2008     WBC 7.65 Thousand/uL      RBC 4.84 Million/uL      Hemoglobin 12.8 g/dL      Hematocrit 40.2 %      MCV 83 fL      MCH 26.4 pg      MCHC 31.8 g/dL      RDW 16.2 %      MPV 9.8 fL      Platelets 310 Thousands/uL      nRBC 0 /100 WBCs      Segmented % 54 %      Immature Grans % 0 %      Lymphocytes % 38 %      Monocytes % 7 %      Eosinophils Relative 1 %      Basophils Relative 0 %      Absolute Neutrophils 4.15 Thousands/µL      Absolute Immature Grans 0.02 Thousand/uL      Absolute Lymphocytes  2.88 Thousands/µL      Absolute Monocytes 0.55 Thousand/µL      Eosinophils Absolute 0.04 Thousand/µL      Basophils Absolute 0.01 Thousands/µL             XR chest 1 view portable   ED Interpretation by Jack Bejarano PA-C ([id number])   Acute cardiopulmonary disease on initial read; no cardiomegaly          ECG 12 Lead Documentation Only    Date/Time: 3/12/2025 8:07 PM    Performed by: Jack Bejarano PA-C  Authorized by: Jack Bejarano PA-C    Indications / Diagnosis:  Low heart rate  ECG reviewed by me, the ED Provider: yes    Patient location:  ED  Previous ECG:     Previous ECG:  Compared to current    Comparison ECG info:  When compared to ECG on 2024, no significant changes were noted.    Similarity:  No change    Comparison to cardiac monitor: Yes    Interpretation:     Interpretation: normal    Rate:     ECG rate:  54    ECG rate assessment: bradycardic    Rhythm:     Rhythm: sinus bradycardia    Ectopy:     Ectopy: none    QRS:     QRS axis:  Normal    QRS intervals:  Normal  Conduction:     Conduction: normal    ST segments:     ST segments:  Normal  T waves:     T waves: normal        ED Medication and Procedure Management   Prior to Admission Medications   Prescriptions Last Dose Informant Patient Reported? Taking?   Pepcid 40 MG tablet  Self Yes No   Si mg daily at bedtime   RABEprazole (ACIPHEX) 20 MG tablet   No No   Sig: TAKE 1 TABLET BY MOUTH DAILY   Patient taking differently: Take 20 mg by mouth daily with lunch   albuterol (VENTOLIN HFA) 90 mcg/act inhaler  Self No No   Sig: Inhale 2 puffs every 6 (six) hours as needed for wheezing   amLODIPine (NORVASC) 5 mg tablet  Self No No   Sig: Take 1 tablet (5 mg total) by mouth daily   co-enzyme Q-10 50 MG capsule  Self Yes No   Sig: Take 100 mg by mouth daily at bedtime   ergocalciferol (VITAMIN D2) 50,000 units  Self Yes No   Sig: Take 50,000 Units by mouth once a week Last taken    ferrous sulfate 325 (65 FE) MG EC tablet  Self Yes  No   Sig: Take 325 mg by mouth 3 (three) times a day with meals   Patient taking differently: Take 325 mg by mouth in the morning   levothyroxine (Synthroid) 137 mcg tablet   No No   Sig: Take 1 tablet (137 mcg total) by mouth daily   pravastatin (PRAVACHOL) 20 mg tablet  Self Yes No   Sig: Take 1 tablet by mouth daily at bedtime   propranolol (INDERAL LA) 160 mg   No No   Sig: Take 1 capsule (160 mg total) by mouth 2 (two) times a day   Patient taking differently: Take 160 mg by mouth 2 (two) times a day Takes at noon and HS   saccharomyces boulardii (FLORASTOR) 250 mg capsule  Self Yes No   Sig: Take 250 mg by mouth daily with dinner   traMADol (Ultram) 50 mg tablet   No No   Sig: Take 1 tablet (50 mg total) by mouth every 6 (six) hours as needed for moderate pain      Facility-Administered Medications: None     Discharge Medication List as of 3/12/2025 11:23 PM        START taking these medications    Details   propranolol (INDERAL) 80 mg tablet Take 1 tablet (80 mg total) by mouth 2 (two) times a day for 7 days, Starting Wed 3/12/2025, Until Wed 3/19/2025, Normal           CONTINUE these medications which have NOT CHANGED    Details   albuterol (VENTOLIN HFA) 90 mcg/act inhaler Inhale 2 puffs every 6 (six) hours as needed for wheezing, Starting Tue 11/27/2018, Normal      amLODIPine (NORVASC) 5 mg tablet Take 1 tablet (5 mg total) by mouth daily, Starting Fri 1/24/2025, Normal      co-enzyme Q-10 50 MG capsule Take 100 mg by mouth daily at bedtime, Historical Med      ergocalciferol (VITAMIN D2) 50,000 units Take 50,000 Units by mouth once a week Last taken 12/1, Starting Tue 11/26/2024, Historical Med      ferrous sulfate 325 (65 FE) MG EC tablet Take 325 mg by mouth 3 (three) times a day with meals, Historical Med      levothyroxine (Synthroid) 137 mcg tablet Take 1 tablet (137 mcg total) by mouth daily, Starting Mon 2/24/2025, Normal      Pepcid 40 MG tablet 40 mg daily at bedtime, Historical Med       pravastatin (PRAVACHOL) 20 mg tablet Take 1 tablet by mouth daily at bedtime, Starting Tue 10/29/2024, Historical Med      RABEprazole (ACIPHEX) 20 MG tablet TAKE 1 TABLET BY MOUTH DAILY, Starting Thu 2/13/2025, Normal      saccharomyces boulardii (FLORASTOR) 250 mg capsule Take 250 mg by mouth daily with dinner, Historical Med      traMADol (Ultram) 50 mg tablet Take 1 tablet (50 mg total) by mouth every 6 (six) hours as needed for moderate pain, Starting Mon 2/24/2025, Normal           STOP taking these medications       propranolol (INDERAL LA) 160 mg Comments:   Reason for Stopping:             No discharge procedures on file.  ED SEPSIS DOCUMENTATION   Time reflects when diagnosis was documented in both MDM as applicable and the Disposition within this note       Time User Action Codes Description Comment    3/12/2025 11:14 PM Jack Bejarano [R79.89] Elevated TSH     3/12/2025 11:15 PM Jack Bejarano [E89.0] History of thyroidectomy     3/12/2025 11:21 PM Jack Bejarano [R00.1] Bradycardia                  Jack Bejarano PA-C  03/13/25 0026

## 2025-03-12 NOTE — TELEPHONE ENCOUNTER
"  FOLLOW UP:   Received a call from Chen concerned about her HR dropping below 50's. Currently is feeling ok denies any cardiac s/s, but noticing today in has been dropping into the upper 40's. Using a pulse ox, and watch, bp monitor to track.   Concerned cause currently taking propanolol.     REASON FOR CONVERSATION: Palpitations    SYMPTOMS: HR dropping below 50's.  Currently was 56 on smart watch    OTHER: hx of thyroid problems going on.   /82 HR 55 taken on the phone  DISPOSITION: Go to ED Now, Discuss With PCP and Callback by Nurse Today (overriding See Today in Office)    Reason for Disposition   Heart beating very rapidly (e.g., > 140 / minute) and not present now  (Exception: During exercise.)   Heart beating very slowly (e.g., < 50 / minute)  (Exception: Athlete and heart rate normal for caller.)    Answer Assessment - Initial Assessment Questions  1. DESCRIPTION: \"Please describe your heart rate or heartbeat that you are having\" (e.g., fast/slow, regular/irregular, skipped or extra beats, \"palpitations\")      HR low  2. ONSET: \"When did it start?\" (e.g., minutes, hours, days)       Today   940 HR 47  11 27 am /82 HR 55, upper arm machine, left arm  3. DURATION: \"How long does it last\" (e.g., seconds, minutes, hours)      Fluctuation   4. PATTERN \"Does it come and go, or has it been constant since it started?\"  \"Does it get worse with exertion?\"   \"Are you feeling it now?\"      Constant today has been checking with   5. TAP: \"Using your hand, can you tap out what you are feeling on a chair or table in front of you, so that I can hear?\" Note: Not all patients can do this.        Unable   6. HEART RATE: \"Can you tell me your heart rate?\" \"How many beats in 15 seconds?\"  Note: Not all patients can do this.        HR 47 this morning, kamille 1001 am 52   7. RECURRENT SYMPTOM: \"Have you ever had this before?\" If Yes, ask: \"When was the last time?\" and \"What happened that time?\"       denies  8. " "CAUSE: \"What do you think is causing the palpitations?\"      Low HR's unsure   9. CARDIAC HISTORY: \"Do you have any history of heart disease?\" (e.g., heart attack, angina, bypass surgery, angioplasty, arrhythmia)       Denies   10. OTHER SYMPTOMS: \"Do you have any other symptoms?\" (e.g., dizziness, chest pain, sweating, difficulty breathing)        Denies    Protocols used: Heart Rate and Heartbeat Questions-Adult-[place]    "

## 2025-03-12 NOTE — TELEPHONE ENCOUNTER
Pt called because she has not heard back yet. She held her propanolol at noon due to low HR and she is now starting with a headache.   Current BP and HR ; 119/77  HR 54    Please call the patient back with recommendations. Pt would be due for second dose of Propanolol 160mg tonight

## 2025-03-13 ENCOUNTER — TELEPHONE (OUTPATIENT)
Age: 45
End: 2025-03-13

## 2025-03-13 LAB — T4 FREE SERPL-MCNC: 0.35 NG/DL (ref 0.61–1.12)

## 2025-03-13 NOTE — TELEPHONE ENCOUNTER
How does the incision look? WNL    Do you have fever or chills? Yes T was 100.7 in ER yesterday ER felt was r/t thyroid //none now    Are you having any pain? No    Do you have a drain(s)? No    Verify post-op appointment date and time  [x]3/24  11AM [facility]    Do you have any other questions or concerns? Pt was seen in the ER yesterday on advise per her Cardiology for bradycardia HR 46  then TSH level noted to be 39.in ER    Pt still feeling sluggish,jittery achy today.  ER instructed her to take the levothyroxine this AM while there taken at 1 AM.    Pt needs to know should she take another one this AM?    Pt will also be calling her Endocrinologist and cardiologist today. The ER MD dec her propanalol to 80mg BID down from 160mg BID.    Pls advise if pt needing repeat TSH level anytime soon?  And is Levothyroxine should be taken again today?    **NOTE TO TRIAGER: If patient requires further triage, based upon the answers above, move to appropriate triage protocol.    No

## 2025-03-13 NOTE — TELEPHONE ENCOUNTER
Spoke to patient and let her know that Dr. Vanegas was in surgery, but that I would send him a message. Spoke to patient and made her aware that Dr Vanegas suggests she take her levothyroxine today and daily now every morning. Patient verbalized understanding and thanks.

## 2025-03-13 NOTE — TELEPHONE ENCOUNTER
Patient calling stating she had her thyroid removed on Monday and went to the ED yesterday. Her TSH was 39 and they gave her a dose of levothyroxine around 1:30 am. She is asking if she should take another dose of it today since her levels are so high? Please advise and call patient ASAP.

## 2025-03-13 NOTE — DISCHARGE INSTRUCTIONS
Patient Education     Propranolol (proe PRAN oh lole)   Brand Names: US Hemangeol; Inderal LA; Inderal XL; InnoPran XL   Brand Names: [place] APO-Propranolol [DSC]; Hemangiol; Inderal LA [DSC]; LUPIN-Propranolol LA; TEVA-Propranolol   What is this drug used for?   It is used to treat high blood pressure.  It is used to treat chest pain or pressure.  It is used to help certain heart problems.  It is used to prevent migraine headaches.  It is used to treat tremor (essential).  It is used after a heart attack to help prevent future heart attacks and lengthen life.  It is used to treat pheochromocytoma.  It is used to treat certain types of abnormal heartbeats.  It may be given to you for other reasons. Talk with the doctor.  What do I need to tell my doctor BEFORE I take this drug?   If you are allergic to this drug; any part of this drug; or any other drugs, foods, or substances. Tell your doctor about the allergy and what signs you had.  If you have any of these health problems: Certain types of abnormal heartbeats called heart block or sick-sinus syndrome, heart failure (weak heart), low blood pressure, poor blood flow to the arms or legs, shock caused by heart problems, or a slow heartbeat.  If you have any of these health problems: Asthma or other breathing problems like COPD (chronic obstructive pulmonary disease).  This is not a list of all drugs or health problems that interact with this drug.  Tell your doctor and pharmacist about all of your drugs (prescription or OTC, natural products, vitamins) and health problems. You must check to make sure that it is safe for you to take this drug with all of your drugs and health problems. Do not start, stop, or change the dose of any drug without checking with your doctor.  What are some things I need to know or do while I take this drug?   All products:   Tell all of your health care providers that you take this drug. This includes your doctors, nurses, pharmacists,  and dentists.  Avoid driving and doing other tasks or actions that call for you to be alert until you see how this drug affects you.  To lower the chance of feeling dizzy or passing out, rise slowly if you have been sitting or lying down. Be careful going up and down stairs.  Check blood pressure and heart rate as the doctor has told you.  This drug may affect certain lab tests. Tell all of your health care providers and lab workers that you take this drug.  Talk with your doctor before you drink alcohol.  If you smoke, talk with your doctor.  This drug may prevent some signs of low blood sugar like fast heartbeat. This may raise the risk of severe or long-lasting low blood sugar, especially in people with diabetes, children, and people who are fasting. This includes people who are having surgery, are not eating like normal, or are throwing up. If you have questions, talk with the doctor.  If you have high blood sugar (diabetes), you will need to watch your blood sugar closely.  Do not stop taking this drug all of a sudden. If you do, chest pain that is worse and in some cases heart attack may occur. The risk may be greater if you have certain types of heart disease. To avoid side effects, you will want to slowly stop this drug as ordered by your doctor. Call your doctor right away if you have new or worse chest pain or if other heart problems occur.  This drug may make it harder to tell if you have signs of an overactive thyroid like fast heartbeat. If you have an overactive thyroid and stop taking this drug all of a sudden, it may get worse and could be life-threatening. Talk with your doctor.  If you are taking this drug and have high blood pressure, talk with your doctor before using OTC products that may raise blood pressure. These include cough or cold drugs, diet pills, stimulants, non-steroidal anti-inflammatory drugs (NSAIDs) like ibuprofen or naproxen, and some natural products or aids.  If you have had a  very bad allergic reaction, talk with your doctor. You may have a chance of an even worse reaction if you come into contact with what caused your allergy. If you use epinephrine to treat very bad allergic reactions, talk with your doctor. Epinephrine may not work as well while you are taking this drug.  Tell your doctor if you are pregnant, plan on getting pregnant, or are breast-feeding. You will need to talk about the benefits and risks to you and the baby.  Oral solution:   Make sure you have the right liquid; there is more than one strength.  What are some side effects that I need to call my doctor about right away?   WARNING/CAUTION: Even though it may be rare, some people may have very bad and sometimes deadly side effects when taking a drug. Tell your doctor or get medical help right away if you have any of the following signs or symptoms that may be related to a very bad side effect:  Signs of an allergic reaction, like rash; hives; itching; red, swollen, blistered, or peeling skin with or without fever; wheezing; tightness in the chest or throat; trouble breathing, swallowing, or talking; unusual hoarseness; or swelling of the mouth, face, lips, tongue, or throat.  Signs of low blood sugar like dizziness, headache, feeling sleepy, feeling weak, shaking, a fast heartbeat, confusion, hunger, or sweating.  Signs of lupus like a rash on the cheeks or other body parts, sunburn easy, muscle or joint pain, chest pain or shortness of breath, or swelling in the arms or legs.  Very bad dizziness or passing out.  Chest pain that is new or worse.  Slow heartbeat.  A heartbeat that does not feel normal.  Shortness of breath, a big weight gain, or swelling in the arms or legs.  Feeling confused.  Hallucinations (seeing or hearing things that are not there).  Memory problems or loss.  Depression or other mood changes.  A burning, numbness, or tingling feeling that is not normal.  Feeling cold in the arms or legs.  Change  in color of hands, feet, or other areas. Skin may turn pale, blue, gray, purple, or red.  Change in eyesight.  Any unexplained bruising or bleeding.  Fever, chills, or sore throat.  Not able to get or keep an erection.  Severe skin reactions have happened with this drug. These have included Sams-Rick syndrome (SJS), toxic epidermal necrolysis (TEN), and other severe skin reactions. Get medical help right away if you have signs like red, swollen, blistered, or peeling skin; other skin irritation (with or without fever); red or irritated eyes; or sores in your mouth, throat, nose, or eyes.  What are some other side effects of this drug?   All drugs may cause side effects. However, many people have no side effects or only have minor side effects. Call your doctor or get medical help if any of these side effects or any other side effects bother you or do not go away:  Feeling dizzy, sleepy, tired, or weak.  Upset stomach or throwing up.  Stomach pain or cramps.  Diarrhea or constipation.  Trouble sleeping.  Strange or odd dreams.  These are not all of the side effects that may occur. If you have questions about side effects, call your doctor. Call your doctor for medical advice about side effects.  You may report side effects to your national health agency.  You may report side effects to the FDA at 1-235.164.5796. You may also report side effects at https://www.fda.gov/medwatch.  How is this drug best taken?   Use this drug as ordered by your doctor. Read all information given to you. Follow all instructions closely.  Inderal XL and InnoPran XL:   Take with or without food but take the same way each time. Always take with food or always take on an empty stomach.  Take at bedtime if taking once a day.  Keep taking this drug as you have been told by your doctor or other health care provider, even if you feel well.  All other oral products:   Some drugs may need to be taken with food or on an empty stomach. For some  drugs it does not matter. Check with your pharmacist about how to take this drug.  Keep taking this drug as you have been told by your doctor or other health care provider, even if you feel well.  Oral solution:   Measure liquid doses carefully. Use the measuring device that comes with this drug. If there is none, ask the pharmacist for a device to measure this drug.  All long-acting products:   Swallow capsule whole. Do not chew, break, or crush.  Injection:   It is given as a shot into a vein.  What do I do if I miss a dose?   All oral products:   Take a missed dose as soon as you think about it.  If it is close to the time for your next dose, skip the missed dose and go back to your normal time.  Do not take 2 doses at the same time or extra doses.  Injection:   Call your doctor to find out what to do.  How do I store and/or throw out this drug?   All oral products:   Store at room temperature. Do not freeze.  Protect from heat, cold, and light.  Keep lid tightly closed.  Store in a dry place. Do not store in a bathroom.  Injection:   If you need to store this drug at home, talk with your doctor, nurse, or pharmacist about how to store it.  All products:   Keep all drugs in a safe place. Keep all drugs out of the reach of children and pets.  Throw away unused or  drugs. Do not flush down a toilet or pour down a drain unless you are told to do so. Check with your pharmacist if you have questions about the best way to throw out drugs. There may be drug take-back programs in your area.  General drug facts   If your symptoms or health problems do not get better or if they become worse, call your doctor.  Do not share your drugs with others and do not take anyone else's drugs.  Some drugs may have another patient information leaflet. If you have any questions about this drug, please talk with your doctor, nurse, pharmacist, or other health care provider.  Some drugs may have another patient information leaflet.  Check with your pharmacist. If you have any questions about this drug, please talk with your doctor, nurse, pharmacist, or other health care provider.  If you think there has been an overdose, call your poison control center or get medical care right away. Be ready to tell or show what was taken, how much, and when it happened.  Consumer Information Use and Disclaimer   This generalized information is a limited summary of diagnosis, treatment, and/or medication information. It is not meant to be comprehensive and should be used as a tool to help the user understand and/or assess potential diagnostic and treatment options. It does NOT include all information about conditions, treatments, medications, side effects, or risks that may apply to a specific patient. It is not intended to be medical advice or a substitute for the medical advice, diagnosis, or treatment of a health care provider based on the health care provider's examination and assessment of a patient's specific and unique circumstances. Patients must speak with a health care provider for complete information about their health, medical questions, and treatment options, including any risks or benefits regarding use of medications. This information does not endorse any treatments or medications as safe, effective, or approved for treating a specific patient. [employer] and its affiliates disclaim any warranty or liability relating to this information or the use thereof. The use of this information is governed by the Terms of Use, available at https://www.[employer].com/en/know/clinical-effectiveness-terms.  Last Reviewed Date   2024-01-03  Copyright   © 2024 [employer] and its affiliates and/or licensors. All rights reserved.  Patient Education     Thyroidectomy   Why is this procedure done?   This surgery is done to take out all or part of your thyroid gland. Your thyroid gland is in the front of your neck. This gland helps regulate your  metabolism. Surgery is done if you have:  An overactive thyroid that makes too many hormones  A large thyroid gland called a goiter  Thyroid cancer or tumor  Small growths or lumps in the thyroid gland  Swelling of the thyroid gland that causes you to have trouble breathing or swallowing     What will the results be?   Your results will be based on why you had the surgery. After surgery you may have:  Less signs of:  Fast heartbeat  Weight loss  Nervousness  Feeling hot and sweating  Feeling tired  Better swallowing or breathing  Cancer will be treated  You may need to take a drug if your entire gland was removed. This drug replaces the hormone that was made by the thyroid.  What happens before the procedure?   Your doctor will ask you about your health history. Talk to the doctor about:  All the drugs you are taking. Be sure to include all prescription and over-the-counter (OTC) drugs, and herbal supplements. Tell the doctor about any drug allergy. Bring a list of drugs you take with you.  Any bleeding problems. Be sure to tell your doctor if you are taking any drugs that may cause bleeding. Some of these are warfarin, rivaroxaban, apixaban, ticagrelor, clopidogrel, ketorolac, ibuprofen, naproxen, or aspirin. Certain vitamins and herbs, such as garlic and fish oil, may also add to the risk for bleeding. You may need to stop these drugs as well. Talk to your doctor about them.  When you need to stop eating or drinking before your surgery.  Your doctor will do an exam and may order:  Ultrasound  MRI  CT scan  A sample of tissue from your thyroid to be collected. This is called a fine needle aspiration.  Lab tests  Laryngeal examination  What happens during the procedure?   Once you are in the operating room, the staff will put an IV in your arm to give you fluids and drugs. You will be given a drug to make you sleepy. It will also help you stay pain free during the surgery.  The doctor makes a cut in front of your  neck. Then, the doctor removes all or part of the gland. A small drain tube may be placed to drain blood and other fluids from around the site to help healing. It will be removed before you go home. The cut will be closed with stitches or skin glue and covered with a bandage. Your thyroid will be sent to the lab to be looked at. This surgery may last from 2 to 4 hours.  What happens after the procedure?   You will go to the Recovery Room and the staff will watch you closely. You may have to stay in the hospital for 1 day. Before you go home you may have:  Blood tests to check your calcium level  Radioactive treatments if you have cancer  Tests to make sure you can swallow water and food  What drugs may be needed?   The doctor may order drugs to:  Help with pain. You may feel pain in your neck for a few days.  Hormone to replace the work of the thyroid  Replace calcium levels  What problems could happen?   Bleeding  Very bad pain  Infection  Scarring  Injury to other glands or nerves near the thyroid  Trouble breathing  Change in voice. This is rare.  Too much thyroid hormone released. This is rare.  Your body may not make enough thyroid hormones. This is called hypothyroidism.  Problem breathing or swallowing  Last Reviewed Date   2021-11-04  Consumer Information Use and Disclaimer   This generalized information is a limited summary of diagnosis, treatment, and/or medication information. It is not meant to be comprehensive and should be used as a tool to help the user understand and/or assess potential diagnostic and treatment options. It does NOT include all information about conditions, treatments, medications, side effects, or risks that may apply to a specific patient. It is not intended to be medical advice or a substitute for the medical advice, diagnosis, or treatment of a health care provider based on the health care provider's examination and assessment of a patient’s specific and unique circumstances.  Patients must speak with a health care provider for complete information about their health, medical questions, and treatment options, including any risks or benefits regarding use of medications. This information does not endorse any treatments or medications as safe, effective, or approved for treating a specific patient. [employer] and its affiliates disclaim any warranty or liability relating to this information or the use thereof. The use of this information is governed by the Terms of Use, available at https://www.[employer].com/en/know/clinical-effectiveness-terms   Copyright   Copyright © 2024 [employer] and its affiliates and/or licensors. All rights reserved.      Take propranolol 80 mg twice daily as directed by endocrinology, start taking levothyroxine 137 mcg daily: Follow-up with endocrinologist

## 2025-03-13 NOTE — ED PROCEDURE NOTE
PROCEDURE  Complex Venous Access Line    Date/Time: 3/12/2025 8:20 PM    Performed by: Guero Brumfield MD  Authorized by: Guero Brumfield MD    Patient location:  ED  Other Assisting Provider: No    Consent:     Consent obtained:  Verbal    Consent given by:  Patient  Pre-procedure details:     Skin preparation:  Alcohol  Procedure details:     Complex Venous Access Line Type: US Guided Peripheral IV      Peripheral IV Indications: other specified disorders of the vein      Orientation:  Left    Location:  Arm    Catheter size:  18 gauge    Patient evaluated for contraindications to access (i.e. fistula, thrombosis, etc): Yes      Approach: percutaneous technique used      Patient position:  Flat    Ultrasound image availability:  Not obtained due to urgency    Number of attempts:  1    Successful placement: yes      Landmarks identified: yes    Anesthesia (see MAR for exact dosages):     Anesthesia method:  None  Post-procedure details:     Post-procedure:  Dressing applied    Patient tolerance of procedure:  Tolerated well, no immediate complications       Guero Brumfield MD  03/12/25 2021

## 2025-03-15 LAB
ATRIAL RATE: 54 BPM
ATRIAL RATE: 56 BPM
P AXIS: 23 DEGREES
P AXIS: 24 DEGREES
PR INTERVAL: 112 MS
PR INTERVAL: 124 MS
QRS AXIS: 40 DEGREES
QRS AXIS: 47 DEGREES
QRSD INTERVAL: 80 MS
QRSD INTERVAL: 84 MS
QT INTERVAL: 426 MS
QT INTERVAL: 434 MS
QTC INTERVAL: 403 MS
QTC INTERVAL: 418 MS
T WAVE AXIS: 12 DEGREES
T WAVE AXIS: 25 DEGREES
VENTRICULAR RATE: 54 BPM
VENTRICULAR RATE: 56 BPM

## 2025-03-15 PROCEDURE — 93010 ELECTROCARDIOGRAM REPORT: CPT | Performed by: INTERNAL MEDICINE

## 2025-03-17 ENCOUNTER — OFFICE VISIT (OUTPATIENT)
Dept: ENDOCRINOLOGY | Facility: HOSPITAL | Age: 45
End: 2025-03-17
Payer: COMMERCIAL

## 2025-03-17 VITALS
BODY MASS INDEX: 45.92 KG/M2 | WEIGHT: 227.8 LBS | HEIGHT: 59 IN | SYSTOLIC BLOOD PRESSURE: 124 MMHG | DIASTOLIC BLOOD PRESSURE: 82 MMHG | HEART RATE: 63 BPM

## 2025-03-17 DIAGNOSIS — E89.0 POSTOPERATIVE HYPOTHYROIDISM: Primary | ICD-10-CM

## 2025-03-17 DIAGNOSIS — Z86.39 HISTORY OF HYPERTHYROIDISM: ICD-10-CM

## 2025-03-17 DIAGNOSIS — I10 ESSENTIAL HYPERTENSION: ICD-10-CM

## 2025-03-17 DIAGNOSIS — E05.00 GRAVES DISEASE: ICD-10-CM

## 2025-03-17 PROBLEM — R79.89 LOW TSH LEVEL: Status: RESOLVED | Noted: 2024-12-02 | Resolved: 2025-03-17

## 2025-03-17 PROCEDURE — 99214 OFFICE O/P EST MOD 30 MIN: CPT | Performed by: INTERNAL MEDICINE

## 2025-03-17 RX ORDER — PROPRANOLOL HYDROCHLORIDE 40 MG/1
40 TABLET ORAL 2 TIMES DAILY
Qty: 60 TABLET | Refills: 1 | Status: SHIPPED | OUTPATIENT
Start: 2025-03-17

## 2025-03-17 RX ORDER — LEVOTHYROXINE SODIUM 137 UG/1
137 TABLET ORAL DAILY
Qty: 30 TABLET | Refills: 3 | Status: SHIPPED | OUTPATIENT
Start: 2025-03-17

## 2025-03-17 NOTE — PROGRESS NOTES
"3/17/2025    Assessment & Plan      Diagnoses and all orders for this visit:    Postoperative hypothyroidism  -     T4, free; Future  -     TSH, 3rd generation; Future  -     T4, free  -     TSH, 3rd generation  -     Comprehensive metabolic panel; Future  -     T4, free; Future  -     TSH, 3rd generation; Future  -     Comprehensive metabolic panel  -     T4, free  -     TSH, 3rd generation    Graves disease  -     levothyroxine (Synthroid) 137 mcg tablet; Take 1 tablet (137 mcg total) by mouth daily  -     T4, free; Future  -     TSH, 3rd generation; Future  -     T4, free  -     TSH, 3rd generation  -     Comprehensive metabolic panel; Future  -     T4, free; Future  -     TSH, 3rd generation; Future  -     Comprehensive metabolic panel  -     T4, free  -     TSH, 3rd generation    History of hyperthyroidism  -     propranolol (INDERAL) 40 mg tablet; Take 1 tablet (40 mg total) by mouth 2 (two) times a day  -     T4, free; Future  -     TSH, 3rd generation; Future  -     T4, free  -     TSH, 3rd generation  -     Comprehensive metabolic panel; Future  -     T4, free; Future  -     TSH, 3rd generation; Future  -     Comprehensive metabolic panel  -     T4, free  -     TSH, 3rd generation        Assessment & Plan  1.  Hypothyroidism post-thyroidectomy.  Her TSH levels were elevated during the last evaluation, indicating a potential shift towards hypothyroidism. This could have contributed to the prevention of a thyroid storm during her surgical procedure. She reports feeling hot and sweaty, experiencing diarrhea, and feeling sluggish. She also mentions a \"swimming\" feeling in the mornings and severe fatigue. She reports some hoarseness since the surgery, which improves as the day goes on. This could be due to reflux or postnasal drip. She will maintain her current regimen of levothyroxine 137 mcg daily was just started. Thyroid function tests will be conducted in approximately 6 to 8 weeks to assess the need for " any adjustments. She is advised to avoid heavy lifting and strenuous activities to allow proper healing.  She of note has not had any symptoms referable to hypocalcemia and calcium was stable in the hospital.    2. Bradycardia.  She experienced a drop in heart rate, leading to an ER visit. Her cardiology office recommended reducing her propranolol dosage. She will continue with the reduced propranolol dosage of 40 mg twice daily until her cardiology appointment on 04/03/2025. If her heart rate remains low by next Wednesday, further reduction will be considered. A prescription for propranolol 40 mg has been provided.    3.  Graves' disease.  She has no evidence of Graves' ophthalmopathy.    I have asked her to get a repeat TSH with free T4 in 6 to 8 weeks.    Follow-up  The patient is scheduled for a follow-up visit in 4 months with preceding CMP, TSH, and free T4.        CC: Hyperthyroid, Graves' follow-up    History of Present Illness    HPI: Chen Peace is a 44-year-old female with a history of hyperthyroidism due to Graves' disease, here for a follow-up visit.  She is accompanied by her mother in the office today.    She was hospitalized from late November to early December 2024 with severe thyrotoxicosis and thyroid storm, during which she was placed on high-dose antithyroid medications and a beta blocker. She underwent a thyroidectomy in early March 2025 and is now here for a post-hospital follow-up visit.    She underwent a thyroidectomy on 03/10/2025, with no reported issues of hypocalcemia post-surgery.     He is currently taking levothyroxine 137 mcg daily.  She has been taking it appropriately on an empty stomach waiting at least 30 minutes to eat and keeping vitamins 3 to 4 hours later in the day.  Methimazole was discontinued when she had surgery.  She reports feeling hot and sweaty, similar to her initial symptoms. She experiences diarrhea after eating and feels sluggish. She reports no tremors,  shaky hands, anxiety, depression, dry skin, hair loss, numbness or tingling around her mouth or fingers, muscle twitches or spasms, or difficulty swallowing. She has noticed increased nail breakage and vision changes, which she attributes to an incorrect contact lens prescription. She reports significant swelling and mild hoarseness since the surgery, which improves as the day progresses. She occasionally feels dizzy, particularly in the mornings, and experiences fatigue. She reports a sensation of pulling at the surgical site but otherwise feels well post-surgery. She plans to return to work in mid-2025.     She was admitted to the ER on the past Wednesday due to bradycardia, as advised by her cardiologist. Her propranolol dosage was subsequently reduced by half, and she is scheduled for a cardiology appointment on 2025. She reports no palpitations or tachycardia, and her heart rate was 70 this morning, despite not taking her propranolol or amlodipine.         Historical Information   Past Medical History:   Diagnosis Date    Anemia     Anxiety and depression     Fibroids     Gallstones     Graves disease     b/l thyroidectomy today 3/10/2025    Hyperlipidemia     Hypertension     Hyperthyroidism determined by thyroid function test     Menorrhagia with regular cycle     MRSA infection     Obesity     Reactive airway disease     Vitamin D deficiency     Wears glasses      Past Surgical History:   Procedure Laterality Date     SECTION      X2    EGD AND COLONOSCOPY  2024    MAMMO (HISTORICAL)  2020    [place] THYROIDECTOMY TOTAL/COMPLETE Bilateral 3/10/2025    Procedure: TOTAL THYROIDECTOMY;  Surgeon: Huber Vanegas MD;  Location: [facility];  Service: Surgical Oncology    TUBAL LIGATION      pt had embedded IUD removed and tubal ligation performed     Social History   Social History     Substance and Sexual Activity   Alcohol Use Not Currently    Comment: rarely     Social History      Substance and Sexual Activity   Drug Use Never     Social History     Tobacco Use   Smoking Status Never    Passive exposure: Never   Smokeless Tobacco Never     Family History:   Family History   Problem Relation Age of Onset    Hyperthyroidism Mother     Thyroid disease unspecified Mother         thyroidectomy for hyperthyroidism    Early menopause Mother     Endometriosis Mother     Hypertension Mother     Hyperlipidemia Mother     Breast cancer Mother     Skin cancer Father     Stroke Father     Coronary artery disease Father     Hypertension Father     Hyperlipidemia Father     Pancreatic cancer Father     Hypertension Maternal Aunt     Hypertension Maternal Uncle     Thyroid disease unspecified Paternal Aunt         thyroidectomy    Hypertension Paternal Aunt     Hypertension Paternal Uncle     Hypothyroidism Maternal Grandmother     Colon cancer Maternal Grandmother     Hypertension Maternal Grandmother     Hypertension Maternal Grandfather     Hypertension Paternal Grandmother     Hypertension Paternal Grandfather     No Known Problems Daughter     No Known Problems Son     Thyroid disease unspecified Cousin         paternal cousin with thyroidectomy    Hypothyroidism Cousin         maternal cousin with hypothyroidism    Thyroid disease unspecified Other     Ovarian cancer Neg Hx     Uterine cancer Neg Hx        Meds/Allergies   Current Outpatient Medications   Medication Sig Dispense Refill    albuterol (VENTOLIN HFA) 90 mcg/act inhaler Inhale 2 puffs every 6 (six) hours as needed for wheezing 18 g 0    amLODIPine (NORVASC) 5 mg tablet Take 1 tablet (5 mg total) by mouth daily 30 tablet 1    co-enzyme Q-10 50 MG capsule Take 100 mg by mouth daily at bedtime      ergocalciferol (VITAMIN D2) 50,000 units Take 50,000 Units by mouth once a week Last taken 12/1      ferrous sulfate 325 (65 FE) MG EC tablet Take 325 mg by mouth 2 (two) times a day      levothyroxine (Synthroid) 137 mcg tablet Take 1 tablet  "(137 mcg total) by mouth daily 30 tablet 3    Pepcid 40 MG tablet 40 mg daily at bedtime      pravastatin (PRAVACHOL) 20 mg tablet Take 1 tablet by mouth daily at bedtime      propranolol (INDERAL) 40 mg tablet Take 1 tablet (40 mg total) by mouth 2 (two) times a day 60 tablet 1    RABEprazole (ACIPHEX) 20 MG tablet TAKE 1 TABLET BY MOUTH DAILY 30 tablet 5    saccharomyces boulardii (FLORASTOR) 250 mg capsule Take 250 mg by mouth daily with dinner       No current facility-administered medications for this visit.     Allergies   Allergen Reactions    Sulfa Antibiotics Anaphylaxis    Sulfamethoxazole-Trimethoprim Anaphylaxis    Mupirocin Hives     Other Reaction(s): swelling lips, flushed cheeks       Objective   Vitals: Blood pressure 124/82, pulse 63, height 4' 11\" (1.499 m), weight 103 kg (227 lb 12.8 oz), last menstrual period 03/07/2025, not currently breastfeeding.  Invasive Devices       None                   Physical Exam    No lid lag, stare, proptosis or periorbital edema. Negative Chvostek's sign.  Healing anterior neck scar. No palpable thyroid tissue or lymphadenopathy.  Lungs are clear to auscultation.  Heart has a regular rate and rhythm. No murmurs.  No tremor of the outstretched hands. Patellar deep tendon reflexes are normal.      The history was obtained from the review of the chart and from the patient.    Lab Results:      Blood work performed on 3/12/2025 when she was in the ED showed a TSH of 39.487 with a free T4 of 0.35.    CBC was normal.    CMP showed a calcium of 8.9 in the setting of an albumin of 4.2.    Lab Results   Component Value Date    CREATININE 0.77 03/12/2025    CREATININE 0.74 01/27/2025    CREATININE 0.51 (L) 12/28/2024    BUN 13 03/12/2025     11/26/2015    K 4.2 03/12/2025     03/12/2025    CO2 27 03/12/2025     eGFR   Date Value Ref Range Status   03/12/2025 92 ml/min/1.73sq m Final       Lab Results   Component Value Date    ALT 13 03/12/2025    AST 15 " 03/12/2025    ALKPHOS 71 03/12/2025    BILITOT 0.61 11/26/2015       Lab Results   Component Value Date    FREET4 0.35 (L) 03/12/2025             Future Appointments   Date Time Provider Department Center   3/24/2025 11:00 AM Huber Vanegas MD SURG ONC [facility]   4/3/2025  9:00 AM PEDRO Bella [facility]   7/23/2025  2:00 PM Josey Judd MD ENDO [facility]

## 2025-03-17 NOTE — PATIENT INSTRUCTIONS
Continue the same levothyroxine 137 mcg daily.     We'll recheck thyroid blood work in about 6-8 weeks.     The propranolol can be decreased further to 40 mg twice a day on Wednesday night. Cut the 80 mg in half to use them up.     Follow up in 4 months with blood work.

## 2025-03-18 RX ORDER — AMLODIPINE BESYLATE 5 MG/1
5 TABLET ORAL DAILY
Qty: 30 TABLET | Refills: 5 | Status: SHIPPED | OUTPATIENT
Start: 2025-03-18

## 2025-03-19 PROBLEM — Z98.890 S/P TOTAL THYROIDECTOMY: Status: ACTIVE | Noted: 2025-03-19

## 2025-03-19 PROBLEM — Z90.89 S/P TOTAL THYROIDECTOMY: Status: ACTIVE | Noted: 2025-03-19

## 2025-03-19 PROBLEM — E89.0 S/P TOTAL THYROIDECTOMY: Status: ACTIVE | Noted: 2025-03-19

## 2025-03-19 PROCEDURE — 88307 TISSUE EXAM BY PATHOLOGIST: CPT | Performed by: STUDENT IN AN ORGANIZED HEALTH CARE EDUCATION/TRAINING PROGRAM

## 2025-03-19 PROCEDURE — 88342 IMHCHEM/IMCYTCHM 1ST ANTB: CPT | Performed by: STUDENT IN AN ORGANIZED HEALTH CARE EDUCATION/TRAINING PROGRAM

## 2025-03-24 ENCOUNTER — OFFICE VISIT (OUTPATIENT)
Dept: SURGICAL ONCOLOGY | Facility: CLINIC | Age: 45
End: 2025-03-24

## 2025-03-24 VITALS
TEMPERATURE: 99.4 F | SYSTOLIC BLOOD PRESSURE: 128 MMHG | HEART RATE: 68 BPM | DIASTOLIC BLOOD PRESSURE: 86 MMHG | OXYGEN SATURATION: 99 % | WEIGHT: 229 LBS | BODY MASS INDEX: 46.16 KG/M2 | RESPIRATION RATE: 20 BRPM | HEIGHT: 59 IN

## 2025-03-24 DIAGNOSIS — Z90.89 S/P TOTAL THYROIDECTOMY: ICD-10-CM

## 2025-03-24 DIAGNOSIS — Z98.890 S/P TOTAL THYROIDECTOMY: ICD-10-CM

## 2025-03-24 DIAGNOSIS — E89.0 S/P TOTAL THYROIDECTOMY: ICD-10-CM

## 2025-03-24 DIAGNOSIS — E05.00 GRAVES DISEASE: Primary | ICD-10-CM

## 2025-03-24 PROCEDURE — 99024 POSTOP FOLLOW-UP VISIT: CPT | Performed by: SURGERY

## 2025-03-24 NOTE — PROGRESS NOTES
Surgical Oncology Follow-up       [facility] [place]  [address] [address]  [phone]    Chen Peace  1980  [id number]  [facility] [place]  [address] [address]  [phone]    Chief Complaint   Patient presents with    Post-op       Assessment/Plan:    No problem-specific Assessment & Plan notes found for this encounter.       Diagnoses and all orders for this visit:    Graves disease    S/P total thyroidectomy      Advance Care Planning/Advance Directives:  Discussed disease status, cancer treatment plans and/or cancer treatment goals with the patient.     Oncology History    No history exists.       History of Present Illness: 44-year-old woman here for postop status post total thyroidectomy for Graves' disease.  She has done well since her operation.  She had to start thyroid medication and wean her beta-blocker off in the days after surgery.  Otherwise she is doing well.    Review of Systems   Constitutional: Negative.    HENT: Negative.     Eyes: Negative.    Respiratory: Negative.     Cardiovascular: Negative.    Gastrointestinal: Negative.    Endocrine: Negative.    Genitourinary: Negative.    Musculoskeletal: Negative.    Skin: Negative.    Allergic/Immunologic: Negative.    Neurological: Negative.    Hematological: Negative.    Psychiatric/Behavioral: Negative.     All other systems reviewed and are negative.        Patient Active Problem List   Diagnosis    Gastroesophageal reflux disease    BMI 45.0-49.9, adult (HCC)    Tachycardia    Syncope    Essential hypertension    Morbid obesity (HCC)    History of hyperthyroidism    Graves disease    Postoperative hypothyroidism    S/P total thyroidectomy     Past Medical History:   Diagnosis Date    Anemia     Anxiety and depression     Fibroids     Gallstones     Graves  disease     b/l thyroidectomy today 3/10/2025    Hyperlipidemia     Hypertension     Hyperthyroidism determined by thyroid function test     Menorrhagia with regular cycle     MRSA infection 2012    Obesity     Reactive airway disease     Vitamin D deficiency     Wears glasses      Past Surgical History:   Procedure Laterality Date     SECTION      X2    EGD AND COLONOSCOPY  2024    MAMMO (HISTORICAL)  2020    [place] THYROIDECTOMY TOTAL/COMPLETE Bilateral 3/10/2025    Procedure: TOTAL THYROIDECTOMY;  Surgeon: Huber Vanegas MD;  Location: [facility];  Service: Surgical Oncology    TUBAL LIGATION      pt had embedded IUD removed and tubal ligation performed     Family History   Problem Relation Age of Onset    Hyperthyroidism Mother     Thyroid disease unspecified Mother         thyroidectomy for hyperthyroidism    Early menopause Mother     Endometriosis Mother     Hypertension Mother     Hyperlipidemia Mother     Breast cancer Mother     Skin cancer Father     Stroke Father     Coronary artery disease Father     Hypertension Father     Hyperlipidemia Father     Pancreatic cancer Father     Hypertension Maternal Aunt     Hypertension Maternal Uncle     Thyroid disease unspecified Paternal Aunt         thyroidectomy    Hypertension Paternal Aunt     Hypertension Paternal Uncle     Hypothyroidism Maternal Grandmother     Colon cancer Maternal Grandmother     Hypertension Maternal Grandmother     Hypertension Maternal Grandfather     Hypertension Paternal Grandmother     Hypertension Paternal Grandfather     No Known Problems Daughter     No Known Problems Son     Thyroid disease unspecified Cousin         paternal cousin with thyroidectomy    Hypothyroidism Cousin         maternal cousin with hypothyroidism    Thyroid disease unspecified Other     Ovarian cancer Neg Hx     Uterine cancer Neg Hx      Social History     Socioeconomic History    Marital status: /Civil Union     Spouse name: Not  on file    Number of children: Not on file    Years of education: Not on file    Highest education level: Not on file   Occupational History    Not on file   Tobacco Use    Smoking status: Never     Passive exposure: Never    Smokeless tobacco: Never   Vaping Use    Vaping status: Never Used   Substance and Sexual Activity    Alcohol use: Not Currently     Comment: rarely    Drug use: Never    Sexual activity: Yes     Partners: Male     Birth control/protection: Female Sterilization   Other Topics Concern    Not on file   Social History Narrative    Not on file     Social Drivers of Health     Financial Resource Strain: Not on file   Food Insecurity: No Food Insecurity (3/10/2025)    Nursing - Inadequate Food Risk Classification     Worried About Running Out of Food in the Last Year: Not on file     Ran Out of Food in the Last Year: Not on file     Ran Out of Food in the Last Year: Never true   Transportation Needs: No Transportation Needs (3/10/2025)    Nursing - Transportation Risk Classification     Lack of Transportation: Not on file     Lack of Transportation: No   Physical Activity: Not on file   Stress: Not on file   Social Connections: Not on file   Intimate Partner Violence: Unknown (3/10/2025)    Nursing IPS     Feels Physically and Emotionally Safe: Not on file     Physically Hurt by Someone: Not on file     Humiliated or Emotionally Abused by Someone: Not on file     Physically Hurt by Someone: No     Hurt or Threatened by Someone: No   Housing Stability: Unknown (3/10/2025)    Nursing: Inadequate Housing Risk Classification     Has Housing: Not on file     Worried About Losing Housing: Not on file     Unable to Get Utilities: Not on file     Unable to Pay for Housing in the Last Year: No     Has Housin       Current Outpatient Medications:     amLODIPine (NORVASC) 5 mg tablet, Take 1 tablet (5 mg total) by mouth daily, Disp: 30 tablet, Rfl: 5    co-enzyme Q-10 50 MG capsule, Take 100 mg by mouth  daily at bedtime, Disp: , Rfl:     ergocalciferol (VITAMIN D2) 50,000 units, Take 50,000 Units by mouth once a week Last taken 12/1, Disp: , Rfl:     ferrous sulfate 325 (65 FE) MG EC tablet, Take 325 mg by mouth 2 (two) times a day, Disp: , Rfl:     levothyroxine (Synthroid) 137 mcg tablet, Take 1 tablet (137 mcg total) by mouth daily, Disp: 30 tablet, Rfl: 3    Pepcid 40 MG tablet, 40 mg daily at bedtime, Disp: , Rfl:     pravastatin (PRAVACHOL) 20 mg tablet, Take 1 tablet by mouth daily at bedtime, Disp: , Rfl:     propranolol (INDERAL) 40 mg tablet, Take 1 tablet (40 mg total) by mouth 2 (two) times a day, Disp: 60 tablet, Rfl: 1    RABEprazole (ACIPHEX) 20 MG tablet, TAKE 1 TABLET BY MOUTH DAILY, Disp: 30 tablet, Rfl: 5    saccharomyces boulardii (FLORASTOR) 250 mg capsule, Take 250 mg by mouth daily with dinner, Disp: , Rfl:     albuterol (VENTOLIN HFA) 90 mcg/act inhaler, Inhale 2 puffs every 6 (six) hours as needed for wheezing (Patient not taking: Reported on 3/24/2025), Disp: 18 g, Rfl: 0  Allergies   Allergen Reactions    Sulfa Antibiotics Anaphylaxis    Sulfamethoxazole-Trimethoprim Anaphylaxis    Mupirocin Hives     Other Reaction(s): swelling lips, flushed cheeks     Vitals:    03/24/25 1108   BP: 128/86   Pulse: 68   Resp: 20   Temp: 99.4 °F (37.4 °C)   SpO2: 99%       Physical Exam  Vitals reviewed.   Constitutional:       Appearance: Normal appearance.   Neck:      Comments: Incision clean dry intact  Musculoskeletal:      Cervical back: Normal range of motion and neck supple.   Neurological:      Mental Status: She is alert.         Pathology:  Case Report   Surgical Pathology Report                         Case: [id number]                                   Authorizing Provider:  Huber Vanegas MD        Collected:           03/10/2025 1628               Ordering Location:     Replaced by [facility]        Received:            03/10/2025 [address] [place] [address]  Room                                                     Pathologist:           Mikhail Marshall MD                                                         Specimen:    Thyroid, Total thyroid suture marks short right, left long                                Final Diagnosis   A. Thyroid; total thyroidectomy:       - Benign thyroid with diffuse hyperplasia and follicular nodular disease      - Minute fragment of normocellular parathyroid tissue      - Negative for malignancy, see note    Electronically signed by Mikhail Marshall MD on 3/19/2025 at 0944 EDT     Labs:  Lab Results   Component Value Date    [id number] 39.487 (H) 03/12/2025    Q3SYDQO 3.1 (H) 12/03/2024         Imaging  XR chest 1 view portable  Result Date: 3/13/2025  Narrative: XR CHEST PORTABLE INDICATION: low heart rate. COMPARISON: 12/23/2024 FINDINGS: Clear lungs. No pneumothorax or pleural effusion. Normal cardiomediastinal silhouette. Bones are unremarkable for age. Normal upper abdomen.     Impression: No acute cardiopulmonary disease. Workstation performed: [id number]     US head neck lymph node mapping  Result Date: 3/5/2025  Narrative: NECK ULTRASOUND INDICATION: E05.00: Thyrotoxicosis with diffuse goiter without thyrotoxic crisis or storm. COMPARISON: None. FINDINGS: Ultrasound of the cervical lymph node chains was performed with a high frequency linear transducer. Lymph nodes maintain normal morphologic contour, echogenicity and short axis dimensions of less than 0.7 cm. No evidence for microcalcification or focal nodularity.     Impression: No evidence of metastatic disease. Workstation performed: [id number]     US thyroid  Result Date: 3/5/2025  Narrative: THYROID ULTRASOUND INDICATION: E05.00: Thyrotoxicosis with diffuse goiter without thyrotoxic crisis or storm. COMPARISON: None TECHNIQUE: Ultrasound of the thyroid was performed with a high frequency linear transducer in transverse and sagittal planes including volumetric imaging  sweeps as well as traditional still imaging technique. FINDINGS: Thyroid texture: Thyroid parenchyma is diffusely heterogeneous in echotexture and also hyperemic. Right lobe: 5.9 x 2.7 x 2.3 cm. Volume 17.2 mL Left lobe: 4.9 x 2.3 x 2.2 cm. Volume 11.7 mL Isthmus: 0.9 cm. Nodule #1. Image 21. RIGHT lower pole nodule measuring 0.9 x 0.7 x 0.8 cm. COMPOSITION: 2 points, solid or almost completely solid. ECHOGENICITY: 1 point, hyperechoic or isoechoic. SHAPE: 0 points, wider-than-tall. MARGIN: 0 points, smooth. ECHOGENIC FOCI: 0 points, none or large comet-tail artifacts. TI-RADS Classification: TR 3 (3 points). FNA if >/= 2.5 cm. Follow if >/= 1.5 cm. Nodule #2. Image 51. LEFT lower pole nodule measuring 0.7 x 0.5 x 0.6 cm. COMPOSITION: 2 points, solid or almost completely solid. ECHOGENICITY: 2 points, hypoechoic. SHAPE: 0 points, wider-than-tall. MARGIN: 0 points, smooth. ECHOGENIC FOCI: 0 points, none or large comet-tail artifacts. TI-RADS Classification: TR 4 (4-6 points), FNA if >/= 1.5 cm. Follow if >/= 1cm. There are additional nodules of lesser size and/or TI-RADS score. These do not necessitate additional evaluation based on ACR criteria.     Impression: No nodule meets current ACR criteria for requiring biopsy or follow-up ultrasounds. Reference: ACR Thyroid Imaging, Reporting and Data System (TI-RADS): White Paper of the ACR TI-RADS Committee. J AM Amelie Radiol 2017;14:587-595. Additional recommendations based on American Thyroid Association 2015 guidelines. Workstation performed: [id number]     I reviewed the above laboratory and imaging data.    Discussion/Summary: Status post total thyroidectomy for Graves' disease.  Doing well.  Thyroid medications have been started.  She is started weaning beta-blocker off per endocrinology team recommendations.  I will plan on seeing her in 6 months with blood work.  All questions answered and copy of path report given her for her records.

## 2025-04-03 ENCOUNTER — OFFICE VISIT (OUTPATIENT)
Dept: CARDIOLOGY CLINIC | Facility: CLINIC | Age: 45
End: 2025-04-03
Payer: COMMERCIAL

## 2025-04-03 VITALS
HEART RATE: 57 BPM | BODY MASS INDEX: 46.2 KG/M2 | WEIGHT: 229.2 LBS | SYSTOLIC BLOOD PRESSURE: 142 MMHG | DIASTOLIC BLOOD PRESSURE: 76 MMHG | HEIGHT: 59 IN

## 2025-04-03 DIAGNOSIS — Z98.890 S/P TOTAL THYROIDECTOMY: ICD-10-CM

## 2025-04-03 DIAGNOSIS — E05.00 GRAVES DISEASE: Primary | ICD-10-CM

## 2025-04-03 DIAGNOSIS — Z90.89 S/P TOTAL THYROIDECTOMY: ICD-10-CM

## 2025-04-03 DIAGNOSIS — Z86.39 HISTORY OF HYPERTHYROIDISM: ICD-10-CM

## 2025-04-03 DIAGNOSIS — I10 ESSENTIAL HYPERTENSION: ICD-10-CM

## 2025-04-03 DIAGNOSIS — E78.5 HYPERLIPIDEMIA, UNSPECIFIED HYPERLIPIDEMIA TYPE: ICD-10-CM

## 2025-04-03 PROCEDURE — 99214 OFFICE O/P EST MOD 30 MIN: CPT

## 2025-04-03 RX ORDER — PROPRANOLOL HYDROCHLORIDE 40 MG/1
20 TABLET ORAL 2 TIMES DAILY
Qty: 60 TABLET | Refills: 1 | Status: SHIPPED | OUTPATIENT
Start: 2025-04-03

## 2025-04-03 NOTE — PROGRESS NOTES
Cardiology Follow Up    Chen Peace  1980  [id number]  [facility] [place]  [address]  [address] [id number] [phone]    1. Graves disease        2. Essential hypertension        3. Hyperlipidemia, unspecified hyperlipidemia type        4. S/P total thyroidectomy        Discussion/Summary:  1.  Graves' disease -status post total thyroidectomy on 3/10/2025  Patient treated with propranolol 160 mg twice daily during thyroid storm. Since her thyroidectomy patient reported bradycardia with heart rate in the 40s leading to evaluation in the emergency department on 3/12/2025.  At that visit her propranolol was decreased to 80 mg twice daily and it was further reduced to 40 mg twice daily on 3/20, she remains on this dosage.  Chen reports heart rates in the 50s at rest and 70s with activity.  Decrease propranolol to 20 mg twice daily on April 5, continue this dose for 2 weeks then he may discontinue propranolol  Synthroid per endocrinology    2.  Hypertension  Treated with amlodipine 5 mg daily -patient brings blood pressure log in which typically has a blood pressure of 130/80 or lower.  Will continue with amlodipine at this dosage at this time.  She will continue to monitor her blood pressure, and let the office know if she is above goal  .  Continue amlodipine 5 mg daily    3 Hyperlipidemia  Treated with pravastatin 20 mg daily -lipid profile followed by her primary care  Continue pravastatin as ordered    Interval History:   Chen Peace is a 44 y.o. female with a past medical history of hyperlipidemia, hypertension, GERD, Graves' disease who presented with thyroid storm on 12/3/2024.  Patient was last seen in the office by myself on 1/24/2025    Since her last visit patient had a total thyroidectomy on 3/10/2025.  She presented to the emergency room on 3/12 with complaints of bradycardia with heart rate in  the 50s.  At that visit her propranolol was decreased from 160 mg to 80 mg twice daily.  She then contacted the office and it was decreased again to 40 mg twice daily.    Chen reports that she has begun taking Synthroid and overall feels well.  Her heart rate remains in the 50s at rest. in the 70s with activities.  She does continue to endorse fatigue but denies lightheadedness, dizziness, palpitations, chest pain or chest discomfort.      Medical Problems       Problem List       Gastroesophageal reflux disease    BMI 45.0-49.9, adult (HCC)    Tachycardia    Syncope    Essential hypertension    Morbid obesity (HCC)    History of hyperthyroidism    Graves disease    Postoperative hypothyroidism    S/P total thyroidectomy    Overview Signed 3/19/2025 10:44 AM by Josey Mendoza RN   3/10/2025   Thyroid; total thyroidectomy:       - Benign thyroid with diffuse hyperplasia and follicular nodular disease      - Minute fragment of normocellular parathyroid tissue      - Negative for malignancy           Past Medical History:   Diagnosis Date    Anemia     Anxiety and depression     Fibroids     Gallstones     Graves disease     b/l thyroidectomy today 3/10/2025    Hyperlipidemia     Hypertension     Hyperthyroidism determined by thyroid function test     Menorrhagia with regular cycle     MRSA infection 2012    Obesity     Reactive airway disease     Vitamin D deficiency     Wears glasses      Social History     Socioeconomic History    Marital status: /Civil Union     Spouse name: Not on file    Number of children: Not on file    Years of education: Not on file    Highest education level: Not on file   Occupational History    Not on file   Tobacco Use    Smoking status: Never     Passive exposure: Never    Smokeless tobacco: Never   Vaping Use    Vaping status: Never Used   Substance and Sexual Activity    Alcohol use: Not Currently     Comment: rarely    Drug use: Never    Sexual activity: Yes     Partners: Male      Birth control/protection: Female Sterilization   Other Topics Concern    Not on file   Social History Narrative    Not on file     Social Drivers of Health     Financial Resource Strain: Not on file   Food Insecurity: No Food Insecurity (3/10/2025)    Nursing - Inadequate Food Risk Classification     Worried About Running Out of Food in the Last Year: Not on file     Ran Out of Food in the Last Year: Not on file     Ran Out of Food in the Last Year: Never true   Transportation Needs: No Transportation Needs (3/10/2025)    Nursing - Transportation Risk Classification     Lack of Transportation: Not on file     Lack of Transportation: No   Physical Activity: Not on file   Stress: Not on file   Social Connections: Not on file   Intimate Partner Violence: Unknown (3/10/2025)    Nursing IPS     Feels Physically and Emotionally Safe: Not on file     Physically Hurt by Someone: Not on file     Humiliated or Emotionally Abused by Someone: Not on file     Physically Hurt by Someone: No     Hurt or Threatened by Someone: No   Housing Stability: Unknown (3/10/2025)    Nursing: Inadequate Housing Risk Classification     Has Housing: Not on file     Worried About Losing Housing: Not on file     Unable to Get Utilities: Not on file     Unable to Pay for Housing in the Last Year: No     Has Housin      Family History   Problem Relation Age of Onset    Hyperthyroidism Mother     Thyroid disease unspecified Mother         thyroidectomy for hyperthyroidism    Early menopause Mother     Endometriosis Mother     Hypertension Mother     Hyperlipidemia Mother     Breast cancer Mother     Skin cancer Father     Stroke Father     Coronary artery disease Father     Hypertension Father     Hyperlipidemia Father     Pancreatic cancer Father     Hypertension Maternal Aunt     Hypertension Maternal Uncle     Thyroid disease unspecified Paternal Aunt         thyroidectomy    Hypertension Paternal Aunt     Hypertension Paternal Uncle      Hypothyroidism Maternal Grandmother     Colon cancer Maternal Grandmother     Hypertension Maternal Grandmother     Hypertension Maternal Grandfather     Hypertension Paternal Grandmother     Hypertension Paternal Grandfather     No Known Problems Daughter     No Known Problems Son     Thyroid disease unspecified Cousin         paternal cousin with thyroidectomy    Hypothyroidism Cousin         maternal cousin with hypothyroidism    Thyroid disease unspecified Other     Ovarian cancer Neg Hx     Uterine cancer Neg Hx      Past Surgical History:   Procedure Laterality Date     SECTION      X2    EGD AND COLONOSCOPY  2024    MAMMO (HISTORICAL)  2020    IN THYROIDECTOMY TOTAL/COMPLETE Bilateral 3/10/2025    Procedure: TOTAL THYROIDECTOMY;  Surgeon: Huber Vanegas MD;  Location: [facility];  Service: Surgical Oncology    TUBAL LIGATION      pt had embedded IUD removed and tubal ligation performed       Current Outpatient Medications:     albuterol (VENTOLIN HFA) 90 mcg/act inhaler, Inhale 2 puffs every 6 (six) hours as needed for wheezing, Disp: 18 g, Rfl: 0    amLODIPine (NORVASC) 5 mg tablet, Take 1 tablet (5 mg total) by mouth daily, Disp: 30 tablet, Rfl: 5    co-enzyme Q-10 50 MG capsule, Take 100 mg by mouth daily at bedtime, Disp: , Rfl:     ergocalciferol (VITAMIN D2) 50,000 units, Take 50,000 Units by mouth once a week Last taken , Disp: , Rfl:     ferrous sulfate 325 (65 FE) MG EC tablet, Take 325 mg by mouth 2 (two) times a day, Disp: , Rfl:     levothyroxine (Synthroid) 137 mcg tablet, Take 1 tablet (137 mcg total) by mouth daily, Disp: 30 tablet, Rfl: 3    Pepcid 40 MG tablet, 40 mg daily at bedtime, Disp: , Rfl:     pravastatin (PRAVACHOL) 20 mg tablet, Take 1 tablet by mouth daily at bedtime, Disp: , Rfl:     propranolol (INDERAL) 40 mg tablet, Take 1 tablet (40 mg total) by mouth 2 (two) times a day, Disp: 60 tablet, Rfl: 1    RABEprazole (ACIPHEX) 20 MG tablet, TAKE 1 TABLET BY  MOUTH DAILY, Disp: 30 tablet, Rfl: 5    saccharomyces boulardii (FLORASTOR) 250 mg capsule, Take 250 mg by mouth daily with dinner, Disp: , Rfl:   Allergies   Allergen Reactions    Sulfa Antibiotics Anaphylaxis    Sulfamethoxazole-Trimethoprim Anaphylaxis    Mupirocin Hives     Other Reaction(s): swelling lips, flushed cheeks       Labs:     Chemistry        Component Value Date/Time     11/26/2015 1400    K 4.2 03/12/2025 1959    K 6.3 (HH) 11/26/2015 1400     03/12/2025 1959     11/26/2015 1400    CO2 27 03/12/2025 1959    CO2 24.7 11/26/2015 1400    BUN 13 03/12/2025 1959    BUN 8 11/26/2015 1400    CREATININE 0.77 03/12/2025 1959    CREATININE 0.16 (L) 11/26/2015 1400        Component Value Date/Time    CALCIUM 8.9 03/12/2025 1959    CALCIUM 9.9 11/26/2015 1400    ALKPHOS 71 03/12/2025 1959    ALKPHOS 64 11/26/2015 1400    AST 15 03/12/2025 1959    AST 32 11/26/2015 1400    ALT 13 03/12/2025 1959    ALT 29 11/26/2015 1400    BILITOT 0.61 11/26/2015 1400        Imaging: XR chest 1 view portable  Result Date: 3/13/2025  Narrative: XR CHEST PORTABLE INDICATION: low heart rate. COMPARISON: 12/23/2024 FINDINGS: Clear lungs. No pneumothorax or pleural effusion. Normal cardiomediastinal silhouette. Bones are unremarkable for age. Normal upper abdomen.     Impression: No acute cardiopulmonary disease. Workstation performed: [id number]     US head neck lymph node mapping  Result Date: 3/5/2025  Impression: No evidence of metastatic disease. Workstation performed: [id number]     US thyroid  Result Date: 3/5/2025  Narrative: THYROID ULTRASOUND INDICATION: E05.00: Thyrotoxicosis with diffuse goiter without thyrotoxic crisis or storm. COMPARISON: None TECHNIQUE: Ultrasound of the thyroid was performed with a high frequency linear transducer in transverse and sagittal planes including volumetric imaging sweeps as well as traditional still imaging technique. FINDINGS: Thyroid texture: Thyroid parenchyma is  "diffusely heterogeneous in echotexture and also hyperemic. Right lobe: 5.9 x 2.7 x 2.3 cm. Volume 17.2 mL Left lobe: 4.9 x 2.3 x 2.2 cm. Volume 11.7 mL Isthmus: 0.9 cm. Nodule #1. Image 21. RIGHT lower pole nodule measuring 0.9 x 0.7 x 0.8 cm. COMPOSITION: 2 points, solid or almost completely solid. ECHOGENICITY: 1 point, hyperechoic or isoechoic. SHAPE: 0 points, wider-than-tall. MARGIN: 0 points, smooth. ECHOGENIC FOCI: 0 points, none or large comet-tail artifacts. TI-RADS Classification: TR 3 (3 points). FNA if >/= 2.5 cm. Follow if >/= 1.5 cm. Nodule #2. Image 51. LEFT lower pole nodule measuring 0.7 x 0.5 x 0.6 cm. COMPOSITION: 2 points, solid or almost completely solid. ECHOGENICITY: 2 points, hypoechoic. SHAPE: 0 points, wider-than-tall. MARGIN: 0 points, smooth. ECHOGENIC FOCI: 0 points, none or large comet-tail artifacts. TI-RADS Classification: TR 4 (4-6 points), FNA if >/= 1.5 cm. Follow if >/= 1cm. There are additional nodules of lesser size and/or TI-RADS score. These do not necessitate additional evaluation based on ACR criteria.     Impression: No nodule meets current ACR criteria for requiring biopsy or follow-up ultrasounds. Reference: ACR Thyroid Imaging, Reporting and Data System (TI-RADS): White Paper of the ACR TI-RADS Committee. J AM Amelie Radiol 2017;14:587-595. Additional recommendations based on American Thyroid Association 2015 guidelines. Workstation performed: [id number]     Review of Systems   Constitutional: Positive for malaise/fatigue.   Cardiovascular:  Negative for chest pain, dyspnea on exertion, irregular heartbeat, leg swelling, near-syncope, orthopnea, palpitations, paroxysmal nocturnal dyspnea and syncope.   Respiratory:  Negative for shortness of breath.    Neurological:  Negative for dizziness, light-headedness and weakness.       Vitals:    04/03/25 0857   BP: 142/76   Pulse: 57     Vitals:    04/03/25 0857   Weight: 104 kg (229 lb 3.2 oz)     Height: 4' 11\" (149.9 cm)   Body " mass index is 46.29 kg/m².    Physical Exam  Constitutional:       Appearance: Normal appearance. She is obese.   HENT:      Head: Normocephalic and atraumatic.      Nose: Nose normal.      Mouth/Throat:      Mouth: Mucous membranes are dry.   Cardiovascular:      Rate and Rhythm: Normal rate and regular rhythm.      Pulses: Normal pulses.      Heart sounds: Normal heart sounds.   Pulmonary:      Effort: Pulmonary effort is normal.      Breath sounds: Normal breath sounds.   Abdominal:      General: Bowel sounds are normal.      Palpations: Abdomen is soft.   Musculoskeletal:      Cervical back: Normal range of motion.      Right lower leg: No edema.      Left lower leg: No edema.   Skin:     General: Skin is warm.   Neurological:      General: No focal deficit present.      Mental Status: She is alert and oriented to person, place, and time. Mental status is at baseline.   Psychiatric:         Mood and Affect: Mood normal.         Behavior: Behavior normal.         Thought Content: Thought content normal.

## 2025-05-18 ENCOUNTER — APPOINTMENT (OUTPATIENT)
Dept: LAB | Facility: HOSPITAL | Age: 45
End: 2025-05-18
Payer: COMMERCIAL

## 2025-05-19 ENCOUNTER — RESULTS FOLLOW-UP (OUTPATIENT)
Dept: ENDOCRINOLOGY | Facility: HOSPITAL | Age: 45
End: 2025-05-19

## 2025-05-19 ENCOUNTER — TELEPHONE (OUTPATIENT)
Dept: CARDIOLOGY CLINIC | Facility: CLINIC | Age: 45
End: 2025-05-19

## 2025-05-19 NOTE — TELEPHONE ENCOUNTER
Spoke with patient and she is aware that 7/30/2025 with Dr Phoenix is cancelled.  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

## 2025-06-06 DIAGNOSIS — E05.00 GRAVES DISEASE: ICD-10-CM

## 2025-06-06 RX ORDER — LEVOTHYROXINE SODIUM 137 UG/1
137 TABLET ORAL DAILY
Qty: 30 TABLET | Refills: 5 | Status: SHIPPED | OUTPATIENT
Start: 2025-06-06

## 2025-06-17 ENCOUNTER — TELEPHONE (OUTPATIENT)
Age: 45
End: 2025-06-17

## 2025-06-17 NOTE — TELEPHONE ENCOUNTER
Patient was seen in the ED of [facility] on 12/2/2024, she was put into a room that same day.  Patient is having a lot of billing issues and billing said they can not help her because it is a coding issue.  She wanted to be connected to Dr. Judd's office but the coding issue would have been a hospital issue.  Patient was transferred 4 times before reaching me.  I sent a  message to my manager to see where I should send her for help.  I told patient someone would be returning her call.

## 2025-06-17 NOTE — TELEPHONE ENCOUNTER
MRN# [id number] Patient was seen in the ED of [facility] on 12/2/2024, she was put into a room that same day.  Patient is having a lot of billing issues and billing said they can not help her because it is a coding issue.  She wanted to be connected to Dr. Judd's office but the coding issue would have been a hospital issue.  Patient was transferred 4 times before reaching me.  Can you tell me where to direct her to get help.  I told patient someone would be returning her call.  Patient I believe said she is being billed as an observation and not an admission .

## 2025-07-21 ENCOUNTER — APPOINTMENT (OUTPATIENT)
Dept: LAB | Facility: CLINIC | Age: 45
End: 2025-07-21
Payer: COMMERCIAL

## 2025-07-21 DIAGNOSIS — Z86.39 PERSONAL HISTORY OF ENDOCRINE DISORDER: ICD-10-CM

## 2025-07-21 DIAGNOSIS — E05.00 BASEDOW'S DISEASE: ICD-10-CM

## 2025-07-21 DIAGNOSIS — E89.0 POSTSURGICAL HYPOTHYROIDISM: ICD-10-CM

## 2025-07-21 LAB
ALBUMIN SERPL BCG-MCNC: 4.1 G/DL (ref 3.5–5)
ALP SERPL-CCNC: 71 U/L (ref 34–104)
ALT SERPL W P-5'-P-CCNC: 11 U/L (ref 7–52)
ANION GAP SERPL CALCULATED.3IONS-SCNC: 9 MMOL/L (ref 4–13)
AST SERPL W P-5'-P-CCNC: 16 U/L (ref 13–39)
BILIRUB SERPL-MCNC: 0.3 MG/DL (ref 0.2–1)
BUN SERPL-MCNC: 11 MG/DL (ref 5–25)
CALCIUM SERPL-MCNC: 9.4 MG/DL (ref 8.4–10.2)
CHLORIDE SERPL-SCNC: 104 MMOL/L (ref 96–108)
CO2 SERPL-SCNC: 26 MMOL/L (ref 21–32)
CREAT SERPL-MCNC: 0.79 MG/DL (ref 0.6–1.3)
GFR SERPL CREATININE-BSD FRML MDRD: 90 ML/MIN/1.73SQ M
GLUCOSE P FAST SERPL-MCNC: 95 MG/DL (ref 65–99)
POTASSIUM SERPL-SCNC: 4.6 MMOL/L (ref 3.5–5.3)
PROT SERPL-MCNC: 7.1 G/DL (ref 6.4–8.4)
SODIUM SERPL-SCNC: 139 MMOL/L (ref 135–147)
T4 FREE SERPL-MCNC: 1.08 NG/DL (ref 0.61–1.12)
TSH SERPL DL<=0.05 MIU/L-ACNC: 1.43 UIU/ML (ref 0.45–4.5)

## 2025-07-21 PROCEDURE — 80053 COMPREHEN METABOLIC PANEL: CPT

## 2025-07-21 PROCEDURE — 84443 ASSAY THYROID STIM HORMONE: CPT

## 2025-07-21 PROCEDURE — 84439 ASSAY OF FREE THYROXINE: CPT

## 2025-07-21 PROCEDURE — 36415 COLL VENOUS BLD VENIPUNCTURE: CPT

## 2025-07-23 ENCOUNTER — OFFICE VISIT (OUTPATIENT)
Dept: ENDOCRINOLOGY | Facility: HOSPITAL | Age: 45
End: 2025-07-23
Payer: COMMERCIAL

## 2025-07-23 VITALS
BODY MASS INDEX: 45.92 KG/M2 | DIASTOLIC BLOOD PRESSURE: 80 MMHG | HEART RATE: 72 BPM | HEIGHT: 59 IN | WEIGHT: 227.8 LBS | SYSTOLIC BLOOD PRESSURE: 122 MMHG

## 2025-07-23 DIAGNOSIS — E05.00 GRAVES DISEASE: ICD-10-CM

## 2025-07-23 DIAGNOSIS — E89.0 POSTOPERATIVE HYPOTHYROIDISM: Primary | ICD-10-CM

## 2025-07-23 PROCEDURE — 99214 OFFICE O/P EST MOD 30 MIN: CPT | Performed by: INTERNAL MEDICINE

## 2025-07-23 RX ORDER — LEVOTHYROXINE SODIUM 137 UG/1
137 TABLET ORAL DAILY
Qty: 90 TABLET | Refills: 3 | Status: SHIPPED | OUTPATIENT
Start: 2025-07-23

## 2025-07-23 NOTE — PATIENT INSTRUCTIONS
The thyroid blood work is normal.     Continue the same levothyroxine.     Follow up in 6 months with blood work.     From my standpoint, no propranolol needed.

## 2025-07-23 NOTE — ASSESSMENT & PLAN NOTE
Most recent thyroid function studies are normal.  She is biochemically euthyroid.  At this point, she will continue the same levothyroxine 137 mcg daily.  She does not need propranolol from an hyperthyroid standpoint, I will leave it up to her cardiologist and primary care as to whether she needs to continue it.    Orders:    T4, free; Future    TSH, 3rd generation; Future

## 2025-07-23 NOTE — PROGRESS NOTES
Name: Chen Peace      : 1980      MRN: [id number]  Encounter Provider: Josey Judd MD  Encounter Date: 2025   [facility]    No chief complaint on file.  :  Assessment & Plan  Postoperative hypothyroidism  Most recent thyroid function studies are normal.  She is biochemically euthyroid.  At this point, she will continue the same levothyroxine 137 mcg daily.  She does not need propranolol from an hyperthyroid standpoint, I will leave it up to her cardiologist and primary care as to whether she needs to continue it.    Orders:    T4, free; Future    TSH, 3rd generation; Future    Graves disease  She has no evidence of Graves' Ophtha myopathy.    Orders:    T4, free; Future    TSH, 3rd generation; Future    levothyroxine 137 mcg tablet; Take 1 tablet (137 mcg total) by mouth daily    I have asked her to follow-up in 6 months with preceding TSH, free T4, and free T3.    I have spent a total time of 32 minutes in caring for this patient on the day of the visit/encounter including Diagnostic results, Prognosis, Instructions for management, Patient and family education, Importance of tx compliance, Risk factor reductions, Impressions, Counseling / Coordination of care, Documenting in the medical record, Reviewing/placing orders in the medical record (including tests, medications, and/or procedures), and Obtaining or reviewing history  .    Assessment & Plan        Pertinent Medical History   Chen Peace is a 44 y.o. female with a history of hypothyroidism post thyroidectomy for hyperthyroidism due to Graves' disease.    She was hospitalized from late November to early 2024 with severe thyrotoxicosis and thyroid storm, during which she was placed on high-dose antithyroid medications and a beta blocker. She underwent a thyroidectomy in early 2025 and is now here for a post-hospital follow-up visit.     She underwent  a thyroidectomy on 03/10/2025, with no reported issues of hypocalcemia post-surgery.  She is now on thyroid hormone for replacement purposes.      History of Present Illness   History of Present Illness    Chen Peace is a 44 y.o. female with a history of hypothyroidism post thyroidectomy for hyperthyroidism due to Graves' disease here for follow-up visit.    She was hospitalized from late November to early December 2024 with severe thyrotoxicosis and thyroid storm, during which she was placed on high-dose antithyroid medications and a beta blocker. She underwent a thyroidectomy in early March 2025 and is now here for a post-hospital follow-up visit.     She underwent a thyroidectomy on 03/10/2025, with no reported issues of hypocalcemia post-surgery.     Was getting dizzy every day and not feeling well. PCP had her increase the propranolol again.     She is taking levothyroixine 137 mcg daily.     Review of Systems   Constitutional:  Positive for fatigue. Negative for unexpected weight change.        Very tired and naps during the day.    HENT:  Negative for trouble swallowing.         Feels like some medications get stuck in throat ont he right side.    Eyes:  Negative for visual disturbance.        No diplopia. Some eye burning like soap in eyes.    Respiratory:  Positive for shortness of breath.         Occasional SOB when active and hot.    Cardiovascular:  Negative for chest pain and palpitations.   Gastrointestinal:  Negative for abdominal pain, constipation, diarrhea and nausea.   Endocrine: Positive for heat intolerance. Negative for cold intolerance.        A bit hot at times.    Skin:  Negative for rash.        Some hair loss recently.  noticed it was losing more. Has dry skin. Has brittle nails.    Neurological:  Negative for tremors.   Psychiatric/Behavioral:  Positive for sleep disturbance. Negative for dysphoric mood. The patient is not nervous/anxious.         Difficulty falling  "asleep. Wakes normal time. Average 3-4 hours a night.     as per HPI  Medical History Reviewed by provider this encounter:  Tobacco  Allergies  Meds  Problems  Med Hx  Surg Hx  Fam Hx     .  Medications Ordered Prior to Encounter[1]   Social History[2]     Medical History Reviewed by provider this encounter:  Tobacco  Allergies  Meds  Problems  Med Hx  Surg Hx  Fam Hx     .    Objective   /80   Pulse 72   Ht 4' 11\" (1.499 m)   Wt 103 kg (227 lb 12.8 oz)   BMI 46.01 kg/m²      Body mass index is 46.01 kg/m².  Wt Readings from Last 3 Encounters:   07/23/25 103 kg (227 lb 12.8 oz)   04/03/25 104 kg (229 lb 3.2 oz)   03/24/25 104 kg (229 lb)     Physical Exam  Vitals and nursing note reviewed.   Constitutional:       General: She is not in acute distress.     Appearance: Normal appearance. She is well-developed.   HENT:      Head: Normocephalic and atraumatic.     Eyes:      Conjunctiva/sclera: Conjunctivae normal.      Comments: No lid lag, stare, proptosis, or periorbital edema.   Neck:      Vascular: No carotid bruit.      Comments: Healed anterior neck scar.  No palpable thyroid tissue.  No lymphadenopathy.  Cardiovascular:      Rate and Rhythm: Normal rate and regular rhythm.      Heart sounds: Normal heart sounds. No murmur heard.  Pulmonary:      Effort: Pulmonary effort is normal. No respiratory distress.      Breath sounds: Normal breath sounds. No wheezing.   Abdominal:      Palpations: Abdomen is soft.     Musculoskeletal:         General: No swelling.      Cervical back: Neck supple.      Right lower leg: No edema.      Left lower leg: No edema.      Comments: No tremor of the outstretched hands.   Lymphadenopathy:      Cervical: No cervical adenopathy.     Skin:     General: Skin is warm and dry.     Neurological:      Mental Status: She is alert and oriented to person, place, and time.      Comments: Patellar deep tendon reflexes normal.   Psychiatric:         Mood and Affect: Mood " normal.       Physical Exam      Results    Labs: I have reviewed pertinent labs including:     Blood work performed on 7/21/2025 showed a CMP with a glucose of 95 fasting but was otherwise normal.  Lab Results   Component Value Date    CREATININE 0.79 07/21/2025    CREATININE 0.77 03/12/2025    CREATININE 0.74 01/27/2025    BUN 11 07/21/2025     11/26/2015    K 4.6 07/21/2025     07/21/2025    CO2 26 07/21/2025      eGFR   Date Value Ref Range Status   07/21/2025 90 ml/min/1.73sq m Final      ALT   Date Value Ref Range Status   07/21/2025 11 7 - 52 U/L Final     Comment:     Specimen collection should occur prior to Sulfasalazine administration due to the potential for falsely depressed results.    11/26/2015 29 12 - 78 U/L Corrected     Comment:     Result changed by [email or web address] on 11/26/2015 14:45. The previous value was 16.     AST   Date Value Ref Range Status   07/21/2025 16 13 - 39 U/L Final   11/26/2015 32 5 - 45 U/L Final     Comment:     Slight Hemolysis     Alkaline Phosphatase   Date Value Ref Range Status   07/21/2025 71 34 - 104 U/L Final   11/26/2015 64 46 - 116 U/L Corrected     Comment:     Result changed by [email or web address] on 11/26/2015 14:45. The previous value was 57.     Total Bilirubin   Date Value Ref Range Status   11/26/2015 0.61 0.20 - 1.00 mg/dL Corrected     Comment:     Result changed by [email or web address] on 11/26/2015 14:45. The previous value was 0.59.      Lab Results   Component Value Date    XOI0QQPXUKAR 1.425 07/21/2025      Lab Results   Component Value Date    FREET4 1.08 07/21/2025    T3FREE 2.88 01/27/2025      Radiology Results Review : No pertinent imaging studies reviewed.  Patient Instructions   The thyroid blood work is normal.     Continue the same levothyroxine.     Follow up in 6 months with blood work.     From my standpoint, no propranolol needed.     Discussed with the patient and all questioned fully answered. She will call me if any problems arise.           [1]   Current Outpatient  Medications on File Prior to Visit   Medication Sig Dispense Refill    albuterol (VENTOLIN HFA) 90 mcg/act inhaler Inhale 2 puffs every 6 (six) hours as needed for wheezing 18 g 0    amLODIPine (NORVASC) 5 mg tablet Take 1 tablet (5 mg total) by mouth daily 30 tablet 5    co-enzyme Q-10 50 MG capsule Take 100 mg by mouth daily at bedtime      ergocalciferol (VITAMIN D2) 50,000 units Take 50,000 Units by mouth once a week Last taken 12/1      ferrous sulfate 325 (65 FE) MG EC tablet Take 325 mg by mouth in the morning and 325 mg in the evening.      Pepcid 40 MG tablet 40 mg daily at bedtime      pravastatin (PRAVACHOL) 20 mg tablet Take 1 tablet by mouth daily at bedtime      propranolol (INDERAL) 40 mg tablet Take 0.5 tablets (20 mg total) by mouth 2 (two) times a day 60 tablet 1    RABEprazole (ACIPHEX) 20 MG tablet TAKE 1 TABLET BY MOUTH DAILY 30 tablet 5    saccharomyces boulardii (FLORASTOR) 250 mg capsule Take 250 mg by mouth daily with dinner       No current facility-administered medications on file prior to visit.   [2]   Social History  Tobacco Use    Smoking status: Never     Passive exposure: Never    Smokeless tobacco: Never   Vaping Use    Vaping status: Never Used   Substance and Sexual Activity    Alcohol use: Not Currently     Comment: rarely    Drug use: Never    Sexual activity: Yes     Partners: Male     Birth control/protection: Female Sterilization

## 2025-07-23 NOTE — ASSESSMENT & PLAN NOTE
She has no evidence of Graves' Ophtha myopathy.    Orders:    T4, free; Future    TSH, 3rd generation; Future    levothyroxine 137 mcg tablet; Take 1 tablet (137 mcg total) by mouth daily    I have asked her to follow-up in 6 months with preceding TSH, free T4, and free T3.

## (undated) DEVICE — SUT SILK 2-0 30 IN A305H

## (undated) DEVICE — THYROID SHEET: Brand: CONVERTORS

## (undated) DEVICE — SURGICEL 2 X 14

## (undated) DEVICE — 10FR FRAZIER SUCTION HANDLE: Brand: CARDINAL HEALTH

## (undated) DEVICE — Device

## (undated) DEVICE — ANTIBACTERIAL UNDYED BRAIDED (POLYGLACTIN 910), SYNTHETIC ABSORBABLE SUTURE: Brand: COATED VICRYL

## (undated) DEVICE — 3M™ STERI-STRIP™ COMPOUND BENZOIN TINCTURE 40 BAGS/CARTON 4 CARTONS/CASE C1544: Brand: 3M™ STERI-STRIP™

## (undated) DEVICE — 2000CC GUARDIAN II: Brand: GUARDIAN

## (undated) DEVICE — SPONGE CHERRY 1/2IN

## (undated) DEVICE — SPONGE STICK WITH PVP-I: Brand: KENDALL

## (undated) DEVICE — PACK UNIVERSAL NECK

## (undated) DEVICE — GLOVE INDICATOR PI UNDERGLOVE SZ 7 BLUE

## (undated) DEVICE — 3M™ STERI-STRIP™ REINFORCED ADHESIVE SKIN CLOSURES, R1547, 1/2 IN X 4 IN (12 MM X 100 MM), 6 STRIPS/ENVELOPE: Brand: 3M™ STERI-STRIP™

## (undated) DEVICE — GAUZE SPONGES,USP TYPE VII GAUZE, 12 PLY: Brand: CURITY

## (undated) DEVICE — GLOVE SRG BIOGEL 7

## (undated) DEVICE — DRAPE TOWEL: Brand: CONVERTORS

## (undated) DEVICE — SUCTION COAGULATOR: Brand: VALLEYLAB

## (undated) DEVICE — APPLIER SURGICLIP PREMIUM SMALL 9IN

## (undated) DEVICE — SUT SILK 2-0 SH 30 IN K833H

## (undated) DEVICE — INTENDED FOR TISSUE SEPARATION, AND OTHER PROCEDURES THAT REQUIRE A SHARP SURGICAL BLADE TO PUNCTURE OR CUT.: Brand: BARD-PARKER SAFETY BLADES SIZE 15, STERILE

## (undated) DEVICE — REM POLYHESIVE ADULT PATIENT RETURN ELECTRODE: Brand: VALLEYLAB

## (undated) DEVICE — SCD SEQUENTIAL COMPRESSION COMFORT SLEEVE MEDIUM KNEE LENGTH: Brand: KENDALL SCD

## (undated) DEVICE — SUT SILK 2-0 SH CR/8 18 IN C012D

## (undated) DEVICE — X-RAY DETECTABLE SPONGES,16 PLY: Brand: VISTEC

## (undated) DEVICE — 4-PORT MANIFOLD: Brand: NEPTUNE 2

## (undated) DEVICE — SUT VICRYL 4-0 SH 27 IN J415H

## (undated) DEVICE — LIGASURE XP 23CM MARYLAND JAW OPEN SEALER/DIVIDER

## (undated) DEVICE — SUT MONOCRYL 5-0 P-3 18 IN Y493G

## (undated) DEVICE — CORD BIPOLAR 12FT REUSEABLE

## (undated) DEVICE — DISPOSABLE OR TOWEL: Brand: CARDINAL HEALTH